# Patient Record
Sex: FEMALE | Race: WHITE | NOT HISPANIC OR LATINO | Employment: OTHER | URBAN - METROPOLITAN AREA
[De-identification: names, ages, dates, MRNs, and addresses within clinical notes are randomized per-mention and may not be internally consistent; named-entity substitution may affect disease eponyms.]

---

## 2018-08-15 ENCOUNTER — OFFICE VISIT (OUTPATIENT)
Dept: MEDICAL GROUP | Facility: MEDICAL CENTER | Age: 43
End: 2018-08-15
Payer: COMMERCIAL

## 2018-08-15 VITALS
SYSTOLIC BLOOD PRESSURE: 102 MMHG | TEMPERATURE: 99.9 F | DIASTOLIC BLOOD PRESSURE: 70 MMHG | HEART RATE: 80 BPM | BODY MASS INDEX: 18.68 KG/M2 | OXYGEN SATURATION: 98 % | HEIGHT: 66 IN | WEIGHT: 116.2 LBS

## 2018-08-15 DIAGNOSIS — Z00.00 PREVENTATIVE HEALTH CARE: ICD-10-CM

## 2018-08-15 DIAGNOSIS — F31.9 BIPOLAR 1 DISORDER (HCC): ICD-10-CM

## 2018-08-15 DIAGNOSIS — F51.01 PRIMARY INSOMNIA: ICD-10-CM

## 2018-08-15 DIAGNOSIS — N63.0 BREAST LUMP IN FEMALE: ICD-10-CM

## 2018-08-15 DIAGNOSIS — Z30.41 ENCOUNTER FOR SURVEILLANCE OF CONTRACEPTIVE PILLS: ICD-10-CM

## 2018-08-15 DIAGNOSIS — G40.909 SEIZURE DISORDER (HCC): ICD-10-CM

## 2018-08-15 PROCEDURE — 99202 OFFICE O/P NEW SF 15 MIN: CPT | Performed by: FAMILY MEDICINE

## 2018-08-15 RX ORDER — LAMOTRIGINE 200 MG/1
400 TABLET ORAL EVERY EVENING
COMMUNITY

## 2018-08-15 RX ORDER — TRAZODONE HYDROCHLORIDE 100 MG/1
100 TABLET ORAL NIGHTLY
COMMUNITY

## 2018-08-15 RX ORDER — NORGESTIMATE AND ETHINYL ESTRADIOL 7DAYSX3 28
1 KIT ORAL DAILY
Qty: 84 TAB | Refills: 3 | Status: SHIPPED
Start: 2018-08-15 | End: 2020-01-04

## 2018-08-15 RX ORDER — NORGESTIMATE AND ETHINYL ESTRADIOL 7DAYSX3 28
1 KIT ORAL DAILY
COMMUNITY
End: 2018-08-15 | Stop reason: SDUPTHER

## 2018-08-15 ASSESSMENT — PATIENT HEALTH QUESTIONNAIRE - PHQ9: CLINICAL INTERPRETATION OF PHQ2 SCORE: 0

## 2018-08-15 NOTE — ASSESSMENT & PLAN NOTE
Takes lamictal   See's psychiatry Dr Marcel Maurice [phone] in [place]   She will coordinate with me and Dr Maurice if changes to contraception

## 2018-08-15 NOTE — LETTER
[facility]  Rosalva Garcia M.D.  [address]  [place] [address]  Fax: [phone]   Authorization for Release/Disclosure of   Protected Health Information   Name: JANET OJEDA : 1975 SSN: [id number]   Address: [address] [place] [address]  [place] [address] Phone:    [phone] (home)    I authorize the entity listed below to release/disclose the PHI below to:   Pending sale to [facility]/Rosalva Garcia M.D. and Rosalva Garcia M.D.   Provider or Entity Name: Manyn Olivas M.D.     Address   City, [facility], [facility]   Phone:      Fax:[phone]     Reason for request: continuity of care   Information to be released:    [  ] LAST COLONOSCOPY,  including any PATH REPORT and follow-up  [  ] LAST FIT/COLOGUARD RESULT [  ] LAST DEXA  [  ] LAST MAMMOGRAM  [  ] LAST PAP  [  ] LAST LABS [  ] RETINA EXAM REPORT  [  ] IMMUNIZATION RECORDS  [xxxx  ] Release all info      [  ] Check here and initial the line next to each item to release ALL health information INCLUDING  _____ Care and treatment for drug and / or alcohol abuse  _____ HIV testing, infection status, or AIDS  _____ Genetic Testing    DATES OF SERVICE OR TIME PERIOD TO BE DISCLOSED: _____________  I understand and acknowledge that:  * This Authorization may be revoked at any time by you in writing, except if your health information has already been used or disclosed.  * Your health information that will be used or disclosed as a result of you signing this authorization could be re-disclosed by the recipient. If this occurs, your re-disclosed health information may no longer be protected by State or Federal laws.  * You may refuse to sign this Authorization. Your refusal will not affect your ability to obtain treatment.  * This Authorization becomes effective upon signing and will  on (date) __________.      If no date is indicated, this Authorization will  one (1) year from the signature date.    Name: Janet Ojeda    Signature:   Date:          8/15/2018       PLEASE FAX REQUESTED RECORDS BACK TO: [phone]

## 2018-08-15 NOTE — ASSESSMENT & PLAN NOTE
She is happy with her current contraceptive pill  Can reduce blood levels of lamictal   If stopping ocp may need to readjust lamictal       was on IUD had painful ovarian cyst her gynocologist switched her to OCP

## 2018-08-15 NOTE — PROGRESS NOTES
This medical record contains text that has been entered with the assistance of computer voice recognition and dictation software.  Therefore, it may contain unintended errors in text, spelling, punctuation, or grammar        Chief Complaint   Patient presents with   • Establish Care     Hx of benign cyst, is know bigger (L), irritated, tender x 2-3 months, getting ,worse       Reanna Ojeda is a 43 y.o. 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     • traZODone (DESYREL) 100 MG Tab Take 100 mg by mouth every evening.     • Norgestim-Eth Estrad Triphasic (TRINESSA, 28,) 0.18/0.215/0.25 MG-35 MCG Tab Take 1 Tab by mouth every day. 84 Tab 3     No current facility-administered medications for this visit.      Patient Active Problem List    Diagnosis Date Noted   • Encounter for surveillance of contraceptive pills 08/15/2018   • Primary insomnia 08/15/2018   • Bipolar 1 disorder (HCC) 08/15/2018   • Preventative health care 08/15/2018   • Breast lump in female 08/15/2018     History reviewed. No pertinent surgical history.   Social History   Substance Use Topics   • Smoking status: Current Some Day Smoker     Packs/day: 1.50     Years: 30.00     Types:  "Cigarettes   • Smokeless tobacco: Never Used      Comment: Smoking cessation   • Alcohol use No      Comment: Sober x 22 years     Family History   Problem Relation Age of Onset   • Hypertension Mother    • Hypertension Father    • Cancer Father    • No Known Problems Sister            ROS  No wt loss no fever  all review of system completed and negative except for those listed above     Objective:     Blood pressure 102/70, pulse 80, temperature 37.7 °C (99.9 °F), height 1.676 m (5' 6\"), weight 52.7 kg (116 lb 3.2 oz), last menstrual period 07/25/2018, SpO2 98 %, not currently breastfeeding. Body mass index is 18.76 kg/m².  Physical Exam:        GEN: comfortable, alert and oriented, well nourished, well developed, in no apparent distress   HEENT: NCAT, eyes: pupils equal and reactive, sclera white, EOMIT, good dentition  HEART: limbs warm and well perfused, regular rate, no JVD, no lower extremity edema  LUNGS: speaking in full sentences, not in apparent respiratory distress, no audible wheezes  MSK: normal tone and bulk, no swelling of the joints, gait steady and normal   See below for breast examination       Assessment and Plan:   The following treatment plan was discussed        Problem List Items Addressed This Visit     Encounter for surveillance of contraceptive pills     She is happy with her current contraceptive pill  Can reduce blood levels of lamictal   If stopping ocp may need to readjust lamictal       was on IUD had painful ovarian cyst her gynocologist switched her to OCP            Relevant Medications    Norgestim-Eth Estrad Triphasic (TRINESSA, 28,) 0.18/0.215/0.25 MG-35 MCG Tab    Primary insomnia     Well controlled on trazodone             Bipolar 1 disorder (HCC)     Takes lamictal   See's psychiatry Dr Marcel Maurice [phone] in [place]   She will coordinate with me and Dr Maurice if changes to contraception                      Relevant Orders    REFERRAL TO PSYCHIATRY    Preventative health " care     Up to date pap had nl 3 years ago           Breast lump in female     She had 2 breast lumps biopsied one on each side benign last year  She feels the lump on her L breast is growing and hurting and interested in surgical options regardless of biopsy results  Ref to breast surgery Dr Shraddha Moss  Diagnostic mammo and ultrasound      L breast 3:00 position 3-4cm, 6:00 position 1 cm, noon position 1cm (all about 1cm away from nipple rubbery mobile )  R breast 2:00 position 1cm 2cm away from nipple rubbery mobile                Relevant Orders    REFERRAL TO PSYCHIATRY    REFERRAL TO GENERAL SURGERY    US-BREAST COMPLETE-LEFT    MA-MAMMO DIAGNOSTIC BILAT W/YULISSA W/CAD    US-BREAST COMPLETE-RIGHT                Instructed to follow up if symptoms worsen or fail to improve, ER/UC precautions discussed as well    Rosalva Garcia MD  [facility], Family Medicine   [address] [place] [address] [address]ky   [place] [address]  Phone: [phone]

## 2018-08-15 NOTE — ASSESSMENT & PLAN NOTE
She had 2 breast lumps biopsied one on each side benign last year  She feels the lump on her L breast is growing and hurting and interested in surgical options regardless of biopsy results  Ref to breast surgery Dr Shraddha Moss  Diagnostic mammo and ultrasound      L breast 3:00 position 3-4cm, 6:00 position 1 cm, noon position 1cm (all about 1cm away from nipple rubbery mobile )  R breast 2:00 position 1cm 2cm away from nipple rubbery mobile

## 2018-08-21 ENCOUNTER — HOSPITAL ENCOUNTER (OUTPATIENT)
Dept: RADIOLOGY | Facility: MEDICAL CENTER | Age: 43
End: 2018-08-21

## 2018-08-27 ENCOUNTER — HOSPITAL ENCOUNTER (OUTPATIENT)
Dept: RADIOLOGY | Facility: MEDICAL CENTER | Age: 43
End: 2018-08-27
Attending: FAMILY MEDICINE
Payer: COMMERCIAL

## 2018-08-27 ENCOUNTER — HOSPITAL ENCOUNTER (OUTPATIENT)
Dept: RADIOLOGY | Facility: MEDICAL CENTER | Age: 43
End: 2018-08-27

## 2018-08-27 DIAGNOSIS — N63.0 BREAST LUMP IN FEMALE: ICD-10-CM

## 2018-08-27 PROCEDURE — 76642 ULTRASOUND BREAST LIMITED: CPT | Mod: RT

## 2018-08-27 PROCEDURE — G0279 TOMOSYNTHESIS, MAMMO: HCPCS

## 2018-09-20 ENCOUNTER — APPOINTMENT (OUTPATIENT)
Dept: ADMISSIONS | Facility: MEDICAL CENTER | Age: 43
End: 2018-09-20
Attending: SURGERY
Payer: COMMERCIAL

## 2018-09-20 RX ORDER — COVID-19 ANTIGEN TEST
220 KIT MISCELLANEOUS PRN
COMMUNITY
End: 2020-02-12

## 2018-09-20 RX ORDER — ACETAMINOPHEN 325 MG/1
650 TABLET ORAL EVERY 4 HOURS PRN
COMMUNITY
End: 2020-05-18

## 2018-09-28 ENCOUNTER — HOSPITAL ENCOUNTER (OUTPATIENT)
Facility: MEDICAL CENTER | Age: 43
End: 2018-09-28
Attending: SURGERY | Admitting: SURGERY
Payer: COMMERCIAL

## 2018-09-28 VITALS
OXYGEN SATURATION: 97 % | WEIGHT: 113.76 LBS | TEMPERATURE: 97.2 F | DIASTOLIC BLOOD PRESSURE: 55 MMHG | RESPIRATION RATE: 16 BRPM | HEIGHT: 66 IN | HEART RATE: 65 BPM | BODY MASS INDEX: 18.28 KG/M2 | SYSTOLIC BLOOD PRESSURE: 102 MMHG

## 2018-09-28 DIAGNOSIS — G89.18 POST-OP PAIN: ICD-10-CM

## 2018-09-28 LAB — HCG SERPL QL: NEGATIVE

## 2018-09-28 PROCEDURE — 88305 TISSUE EXAM BY PATHOLOGIST: CPT

## 2018-09-28 PROCEDURE — 160048 HCHG OR STATISTICAL LEVEL 1-5: Performed by: SURGERY

## 2018-09-28 PROCEDURE — 700111 HCHG RX REV CODE 636 W/ 250 OVERRIDE (IP)

## 2018-09-28 PROCEDURE — A9270 NON-COVERED ITEM OR SERVICE: HCPCS | Performed by: STUDENT IN AN ORGANIZED HEALTH CARE EDUCATION/TRAINING PROGRAM

## 2018-09-28 PROCEDURE — 700101 HCHG RX REV CODE 250

## 2018-09-28 PROCEDURE — 84703 CHORIONIC GONADOTROPIN ASSAY: CPT

## 2018-09-28 PROCEDURE — 160009 HCHG ANES TIME/MIN: Performed by: SURGERY

## 2018-09-28 PROCEDURE — 160029 HCHG SURGERY MINUTES - 1ST 30 MINS LEVEL 4: Performed by: SURGERY

## 2018-09-28 PROCEDURE — 160036 HCHG PACU - EA ADDL 30 MINS PHASE I: Performed by: SURGERY

## 2018-09-28 PROCEDURE — 700102 HCHG RX REV CODE 250 W/ 637 OVERRIDE(OP): Performed by: STUDENT IN AN ORGANIZED HEALTH CARE EDUCATION/TRAINING PROGRAM

## 2018-09-28 PROCEDURE — 160035 HCHG PACU - 1ST 60 MINS PHASE I: Performed by: SURGERY

## 2018-09-28 PROCEDURE — 160002 HCHG RECOVERY MINUTES (STAT): Performed by: SURGERY

## 2018-09-28 PROCEDURE — 501838 HCHG SUTURE GENERAL: Performed by: SURGERY

## 2018-09-28 PROCEDURE — 160041 HCHG SURGERY MINUTES - EA ADDL 1 MIN LEVEL 4: Performed by: SURGERY

## 2018-09-28 PROCEDURE — A6402 STERILE GAUZE <= 16 SQ IN: HCPCS | Performed by: SURGERY

## 2018-09-28 RX ORDER — BUPIVACAINE HYDROCHLORIDE AND EPINEPHRINE 5; 5 MG/ML; UG/ML
INJECTION, SOLUTION PERINEURAL
Status: DISCONTINUED | OUTPATIENT
Start: 2018-09-28 | End: 2018-09-28 | Stop reason: HOSPADM

## 2018-09-28 RX ORDER — SODIUM CHLORIDE, SODIUM LACTATE, POTASSIUM CHLORIDE, CALCIUM CHLORIDE 600; 310; 30; 20 MG/100ML; MG/100ML; MG/100ML; MG/100ML
INJECTION, SOLUTION INTRAVENOUS CONTINUOUS
Status: DISCONTINUED | OUTPATIENT
Start: 2018-09-28 | End: 2018-09-28 | Stop reason: HOSPADM

## 2018-09-28 RX ORDER — OXYCODONE HCL 5 MG/5 ML
10 SOLUTION, ORAL ORAL
Status: DISCONTINUED | OUTPATIENT
Start: 2018-09-28 | End: 2018-09-28 | Stop reason: HOSPADM

## 2018-09-28 RX ORDER — HYDROMORPHONE HYDROCHLORIDE 2 MG/ML
0.1 INJECTION, SOLUTION INTRAMUSCULAR; INTRAVENOUS; SUBCUTANEOUS
Status: DISCONTINUED | OUTPATIENT
Start: 2018-09-28 | End: 2018-09-28 | Stop reason: HOSPADM

## 2018-09-28 RX ORDER — ACETAMINOPHEN 500 MG
1000 TABLET ORAL ONCE
Status: COMPLETED | OUTPATIENT
Start: 2018-09-28 | End: 2018-09-28

## 2018-09-28 RX ORDER — HALOPERIDOL 5 MG/ML
1 INJECTION INTRAMUSCULAR
Status: DISCONTINUED | OUTPATIENT
Start: 2018-09-28 | End: 2018-09-28 | Stop reason: HOSPADM

## 2018-09-28 RX ORDER — OXYCODONE HYDROCHLORIDE 5 MG/1
5 TABLET ORAL
Status: DISCONTINUED | OUTPATIENT
Start: 2018-09-28 | End: 2018-09-28 | Stop reason: HOSPADM

## 2018-09-28 RX ORDER — ONDANSETRON 2 MG/ML
4 INJECTION INTRAMUSCULAR; INTRAVENOUS
Status: DISCONTINUED | OUTPATIENT
Start: 2018-09-28 | End: 2018-09-28 | Stop reason: HOSPADM

## 2018-09-28 RX ORDER — MEPERIDINE HYDROCHLORIDE 50 MG/ML
INJECTION INTRAMUSCULAR; INTRAVENOUS; SUBCUTANEOUS
Status: COMPLETED
Start: 2018-09-28 | End: 2018-09-28

## 2018-09-28 RX ORDER — CELECOXIB 200 MG/1
400 CAPSULE ORAL ONCE
Status: COMPLETED | OUTPATIENT
Start: 2018-09-28 | End: 2018-09-28

## 2018-09-28 RX ORDER — OXYCODONE HYDROCHLORIDE 5 MG/1
10 TABLET ORAL
Status: DISCONTINUED | OUTPATIENT
Start: 2018-09-28 | End: 2018-09-28 | Stop reason: HOSPADM

## 2018-09-28 RX ORDER — MEPERIDINE HYDROCHLORIDE 25 MG/ML
12.5 INJECTION INTRAMUSCULAR; INTRAVENOUS; SUBCUTANEOUS
Status: DISCONTINUED | OUTPATIENT
Start: 2018-09-28 | End: 2018-09-28 | Stop reason: HOSPADM

## 2018-09-28 RX ORDER — OXYCODONE HCL 5 MG/5 ML
5 SOLUTION, ORAL ORAL
Status: DISCONTINUED | OUTPATIENT
Start: 2018-09-28 | End: 2018-09-28 | Stop reason: HOSPADM

## 2018-09-28 RX ORDER — HYDROMORPHONE HYDROCHLORIDE 2 MG/ML
0.2 INJECTION, SOLUTION INTRAMUSCULAR; INTRAVENOUS; SUBCUTANEOUS
Status: DISCONTINUED | OUTPATIENT
Start: 2018-09-28 | End: 2018-09-28 | Stop reason: HOSPADM

## 2018-09-28 RX ORDER — HYDROCODONE BITARTRATE AND ACETAMINOPHEN 5; 325 MG/1; MG/1
1-2 TABLET ORAL EVERY 4 HOURS PRN
Qty: 30 TAB | Refills: 0 | Status: SHIPPED | OUTPATIENT
Start: 2018-09-28 | End: 2018-10-05

## 2018-09-28 RX ORDER — HYDROMORPHONE HYDROCHLORIDE 2 MG/ML
0.4 INJECTION, SOLUTION INTRAMUSCULAR; INTRAVENOUS; SUBCUTANEOUS
Status: DISCONTINUED | OUTPATIENT
Start: 2018-09-28 | End: 2018-09-28 | Stop reason: HOSPADM

## 2018-09-28 RX ORDER — BUPIVACAINE HYDROCHLORIDE AND EPINEPHRINE 5; 5 MG/ML; UG/ML
INJECTION, SOLUTION EPIDURAL; INTRACAUDAL; PERINEURAL
Status: DISCONTINUED
Start: 2018-09-28 | End: 2018-09-28 | Stop reason: HOSPADM

## 2018-09-28 RX ADMIN — MEPERIDINE HYDROCHLORIDE 12.5 MG: 50 INJECTION INTRAMUSCULAR; INTRAVENOUS; SUBCUTANEOUS at 09:43

## 2018-09-28 RX ADMIN — SODIUM CHLORIDE, SODIUM LACTATE, POTASSIUM CHLORIDE, CALCIUM CHLORIDE 1000 ML: 600; 310; 30; 20 INJECTION, SOLUTION INTRAVENOUS at 09:54

## 2018-09-28 RX ADMIN — MEPERIDINE HYDROCHLORIDE 12.5 MG: 50 INJECTION INTRAMUSCULAR; INTRAVENOUS; SUBCUTANEOUS at 09:45

## 2018-09-28 RX ADMIN — ACETAMINOPHEN 1000 MG: 500 TABLET, FILM COATED ORAL at 08:11

## 2018-09-28 RX ADMIN — CELECOXIB 400 MG: 200 CAPSULE ORAL at 08:12

## 2018-09-28 RX ADMIN — SODIUM CHLORIDE, SODIUM LACTATE, POTASSIUM CHLORIDE, CALCIUM CHLORIDE: 600; 310; 30; 20 INJECTION, SOLUTION INTRAVENOUS at 07:53

## 2018-09-28 ASSESSMENT — PAIN SCALES - GENERAL
PAINLEVEL_OUTOF10: 1
PAINLEVEL_OUTOF10: 0
PAINLEVEL_OUTOF10: 1
PAINLEVEL_OUTOF10: 0
PAINLEVEL_OUTOF10: 0

## 2018-09-28 NOTE — PROGRESS NOTES
0916 pt adm to PACU from OR via sonia carranza by RN and Anesthesia. Report received and care assumed. Monitors on - VSS, breathing even and unlabored, Left breast dressing - DSD with tegaderm over - YOKASTA  0927 - LMA removed without problems and/or complications. Pt denies pain or nausea - ice to incision  1025 discharge instructions reviewed with pt and family. All questions and concerns addressed.  1044 pt discharged via W/C to private car - acc by family and  Lex

## 2018-09-28 NOTE — OP REPORT
Operative Report    Date: 9/28/2018    Surgeon: Shraddha Moss M.D.    Assistant: PIERO Washington    Anesthesiologist: Chuck NEWSOME    Pre-operative Diagnosis:  left breast palpable abnormality    Post-operative Diagnosis: same     Procedure: left breast excisional biopsy    19120 Excision of cyst, fibroadenoma, or other benign or malignant tumor, aberrant breast tissue, duct lesion, nipple or areolar lesion (except 19300), open, male or female, 1 or more lesions    Procedure in detail: The patient was identified in the pre-operative holding area and brought to the operating room. Correct side and site were identified. Pre-operative antibiotics of ancef were administered prior to the procedure. Anesthesia was smoothly induced.The patient was then prepped and draped in the usual sterile fashion.    The skin was infiltrated with local anesthetic and a curvilinear incision was made in the upper outer quadrant of the nipple line. The breast tissue was grasped with Allis clamps and a core of tissue was removed encompassing the palpable abnormality.The specimen was then completely removed, marked with suture for orientation, and was sent for permanent pathology. Meticulous hemostasis was achieved with electrocautery. The area was irrigated and suctioned. The skin was closed in two layers with vicryl and monocryl. Sterile dressings were applied.     The patient was awakened from anesthetic, and was taken to the recovery room in stable condition.    Sponge and needle counts were correct at the end of the case.     Specimen: left breast mass    EBL: minimal    Dispo: stable, extubated, to PACU    Shraddha Moss M.D.  [facility]  [phone]

## 2018-09-28 NOTE — PROGRESS NOTES
Discharge Instructions, Lumpectomy:    Care of Your Incisions:  • Leave the bandages on for 48 hours. You can shower with the dressing on as it is waterproof.  Avoid getting lotions, powders or deodorant on the incision while it is healing.  • There is no need to re-bandage the incisions after the first 48 hours, but it may be more comfortable to tuck a gauze pad inside your bra for the first week. You can buy 4 x 4 gauze dressings at your pharmacy.   • You may have thin paper strips across the incision. These are called Steri-Strips. When they start to curl at the edges, you can peel them off. It is okay to shower with these on.  • Don’t worry if the area under either incision feels firm or hard. This is normal and usually softens within a few months.    How to Manage Discomfort After Surgery:  • It is normal to have tenderness, discomfort or mild swelling at the site of the incisions.     You may also feel:  - Numbness at the incisions.  - Occasional shooting pains in the breast as you heal.    • You will be given a prescription for pain medication prior to being discharged from the hospital. If you are having pain, take the medication as directed by your physician and by the label directions. You should be comfortable and moving around. Most women use some prescription pain medication, though some need only over the counter pain medication, such as ibuprofen (Motrin) or acetaminophen (Tylenol). Some women have little pain and take nothing at all. Whatever amount of pain you have, it is important to listen to your body and use the medication if needed during your recovery.    • Prescription pain medication may cause constipation. If you are having problems, use what you normally would or call your nurse for suggestions. It also helps to stay regular by including fiber in your diet (for example: bran or fruits and vegetables) and drink plenty of liquids (water, juice, etc.).    Activity:  • You may resume your  normal routine, but avoid heavy lifting (over 10 pounds), pushing or pulling until your first post-operative visit, unless otherwise specified by your surgeon.  • Do not drive for at least 24-48 hours after surgery (unless otherwise directed by your surgeon), or while you are taking prescription pain medicine. Prescription pain medicine causes drowsiness (makes you sleepy) so you should avoid doing any tasks where you should be awake and alert (driving, operating machinery, etc).    When to Call Your Doctor/Nurse:  • If you have a fever greater than 100.5, increased pain, redness or warmth at the area around the incision, drainage from the incision or around the drain, or swelling of the arm. Call Dr. Moss's office at [phone] with any other questions or concerns.    You should have an appointment to see Dr. Moss about a week after surgery, call [phone] if you do not already have an appointment.    Office address:  [address] [place] [address] [address]  [place] [id number]      Shraddha Moss M.D.  [facility]  [phone]

## 2018-09-28 NOTE — DISCHARGE INSTRUCTIONS
ACTIVITY: Rest and take it easy for the first 24 hours.  A responsible adult is recommended to remain with you during that time.  It is normal to feel sleepy.  We encourage you to not do anything that requires balance, judgment or coordination.    MILD FLU-LIKE SYMPTOMS ARE NORMAL. YOU MAY EXPERIENCE GENERALIZED MUSCLE ACHES, THROAT IRRITATION, HEADACHE AND/OR SOME NAUSEA.    FOR 24 HOURS DO NOT:  Drive, operate machinery or run household appliances.  Drink beer or alcoholic beverages.   Make important decisions or sign legal documents.    SPECIAL INSTRUCTIONS: PER MD    DIET: To avoid nausea, slowly advance diet as tolerated, avoiding spicy or greasy foods for the first day.  Add more substantial food to your diet according to your physician's instructions.  Babies can be fed formula or breast milk as soon as they are hungry.  INCREASE FLUIDS AND FIBER TO AVOID CONSTIPATION.    SURGICAL DRESSING/BATHING: PER MD    FOLLOW-UP APPOINTMENT:  A follow-up appointment should be arranged with your doctor in ONE WEEK; call to schedule.    You should CALL YOUR PHYSICIAN if you develop:  Fever greater than 101 degrees F.  Pain not relieved by medication, or persistent nausea or vomiting.  Excessive bleeding (blood soaking through dressing) or unexpected drainage from the wound.  Extreme redness or swelling around the incision site, drainage of pus or foul smelling drainage.  Inability to urinate or empty your bladder within 8 hours.  Problems with breathing or chest pain.    You should call 911 if you develop problems with breathing or chest pain.  If you are unable to contact your doctor or surgical center, you should go to the nearest emergency room or urgent care center.  Physician's telephone #: [phone]    If any questions arise, call your doctor.  If your doctor is not available, please feel free to call the Surgical Center at [phone].  The Center is open Monday through Friday from 7AM to 7PM.  You can also call  the HEALTH HOTLINE open 24 hours/day, 7 days/week and speak to a nurse at [phone], or toll free at [phone].    A registered nurse may call you a few days after your surgery to see how you are doing after your procedure.    MEDICATIONS: Resume taking daily medication.  Take prescribed pain medication with food.  If no medication is prescribed, you may take non-aspirin pain medication if needed.  PAIN MEDICATION CAN BE VERY CONSTIPATING.  Take a stool softener or laxative such as senokot, pericolace, or milk of magnesia if needed.    Prescription given for NORCO.  Last pain medication given at NA.    If your physician has prescribed pain medication that includes Acetaminophen (Tylenol), do not take additional Acetaminophen (Tylenol) while taking the prescribed medication.    Depression / Suicide Risk    As you are discharged from this [facility] facility, it is important to learn how to keep safe from harming yourself.    Recognize the warning signs:  · Abrupt changes in personality, positive or negative- including increase in energy   · Giving away possessions  · Change in eating patterns- significant weight changes-  positive or negative  · Change in sleeping patterns- unable to sleep or sleeping all the time   · Unwillingness or inability to communicate  · Depression  · Unusual sadness, discouragement and loneliness  · Talk of wanting to die  · Neglect of personal appearance   · Rebelliousness- reckless behavior  · Withdrawal from people/activities they love  · Confusion- inability to concentrate     If you or a loved one observes any of these behaviors or has concerns about self-harm, here's what you can do:  · Talk about it- your feelings and reasons for harming yourself  · Remove any means that you might use to hurt yourself (examples: pills, rope, extension cords, firearm)  · Get professional help from the community (Mental Health, Substance Abuse, psychological counseling)  · Do not be  alone:Call your Safe Contact- someone whom you trust who will be there for you.  · Call your local CRISIS HOTLINE 888-8152 or 262-187-6774  · Call your local Children's Mobile Crisis Response Team [place] (827) 250-4664 or www.[employer]  · Call the toll free National Suicide Prevention Hotlines   · National Suicide Prevention Lifeline 359-588-EHPN (4521)  · National utoopia Line Network 800-SUICIDE (363-4059)

## 2018-09-28 NOTE — H&P
"[History and Physical] [Reanna Ojeda] [[id number]]  History and Physical     Patient Name: Reanna Ojeda   Patient ID: [id number]   Sex: Female   YOB: 1975       Referring Provider: Rosalva Garcia MD    Visit Date: 2018   Provider: Shraddha Moss MD   Location: [facility]   Location Address: [address] [place] [address]  KADEEM Mcdonald  [id number]   Location Phone: [phone]         Chief Complaint  · \"I feel a mass in my left breast.\"     History Of Present Illness  The patient is a 43 year old /White female, who presents on referral from Rosalva Garcia MD for a surgical evaluation of a palpable breast mass. The masses are located in the left breast at the 3 o'clock position, 6 o'clock position and 12 o'clock and in the right breast at the 2 o'clock position has been present for one year .    She also reports breast tenderness and breast swelling. She describes tenderness in the from about 10-3 o'clock position of the left breast. The swelling is in the 3 o'clock area of the left breast.    Her history is notable for previous benign biopsy. She reports having a benign biopsy of the mass at the 6 o'clock position of the left breast in . A biopsy of the lesion performed on 2017 revealed left breast upper-outer quadrant: fibroadenomatoid hyperplasia and cyst formation with associated calcifications. Breast, right, 10 o'clock mass: fibroepithelial proliferation consistent with fibroadenoma, Breast left 2 o'clock mass: fibroepithelial proliferation consistent with fibroadenoma..    She is premenopausal and her pregnancy history is  1 , Para 1 , Abortus 0. She does not have a history of using HRT. She is currently on BCP, which she has used for the last 3 years.    Family History:  The patient reports a positive family history of breast cancer. She states her paternal grandmother had breast cancer (see FMHx).       Past Medical History  Disease Name Date Onset Notes "   Cyst (Type of Cyst) 2015, 2017, - [employer] 08/20/2018 (B) breast ovarian cyst   Family history of breast cancer -- --   History of drug use -- Speed, ages: [age]   Joint Pain / DJD (Degenerative Joint Disease) -- - [employer] 08/20/2018 C4/5, with bulging   Manic depressive disorder -- --   Psychological Disorder (List type) -- - [employer] 08/20/2018   Tobacco use disorder -- --         Past Surgical History  Procedure Name Date Notes   Biopsy (List Types) 7/2017 - [employer] 08/20/2018 (B) breast         Medication List  Name Date Started Instructions   BCP  PO QD   Lamictal 200 mg oral tablet  take 2 tablets (400 mg) by oral route once daily   trazodone 100 mg oral tablet  take 1 tablet (100 mg) by oral route once daily at bedtime         Allergy List  Allergen Name Date Reaction Notes   NO KNOWN DRUG ALLERGIES -- -- - [employer] 08/20/2018   No Known Food or Environmental Allergy -- -- - [employer] 08/20/2018         Family Medical History  Disease Name Relative/Age Notes   Family History of Prostate Cancer Grandmother (maternal)/ --    Grandmother (paternal)/60s     Uncle/64     /    Heart Disease (FH) Grandmother (maternal)/ --    Grandmother (paternal)/60s     Uncle/64     /    Diabetes (List Type) Grandmother (maternal)/ --    Grandmother (paternal)/60s     Uncle/64     /    Family history of breast cancer Grandmother (maternal)/ --    Grandmother (paternal)/60s     Uncle/64     /          Social History  Finding Status Start/Stop Quantity Notes   Alcohol Never --/-- -- - [employer] 08/20/2018   Drug use Former [age] speed Denies hx of IVDU    -- --/-- -- - [employer] 08/20/2018   Tobacco Current every day 15/-- 1 pk less/day - [employer] 08/20/2018   Unemployed -- --/-- -- - [employer] 08/20/2018         Immunizations  Name  Date Admin Mfg Trade Name Lot Number Route Inj VIS Given VIS Publication   Influenza  Refused 09/04/2018 NE Not Entered  NE NE     Comments: **cc9/4/18         Review of  "Systems  · Constitutional  ? Denies : fever, chills, weight loss, weight gain, fatigue, loss of appetite, night sweats  · Eyes  ? Admits : wear glasses/contact lenses  ? Denies : changes in vision  · HENT  ? Denies : headaches, hoarseness, difficulty swallowing  · Breasts  ? Admits : lumps, tenderness, swelling  ? Denies : nipple discharge  · Cardiovascular  ? Denies : cardiac murmurs, irregular heart beats  · Respiratory  ? Denies : shortness of breath, hoarseness  · Gastrointestinal  ? Denies : nausea, vomiting, diarrhea, constipation, blood in stools  · Genitourinary  ? Denies : urgency, frequency, dysuria, nocturia, hematuria, urinary hesitancy  · Integument  ? Denies : rash, new skin lesions, non-healing wounds  · Neurologic  ? Denies : tingling or numbness, muscular weakness, headache  · Musculoskeletal  ? Denies : muscular weakness  · Endocrine  ? Denies : diabetes  · Psychiatric  ? Admits : anxiety, depression  ? Denies : difficulty sleeping  · Heme-Lymph  ? Denies : easy bleeding, easy bruising, lymph node enlargement or tenderness  · Allergic-Immunologic  ? Denies : frequent illnesses     Vitals  Date Time BP Position Site L\R Cuff Size HR RR TEMP (F) WT HT BMI kg/m2 BSA m2 O2 Sat       09/04/2018 09:48 /46 Sitting    71 - R   114lbs 0oz 5'  6\" 18.4 1.55 99 %          Physical Examination  · Constitutional  ? Appearance : A well-nourished, well-developed patient in no acute distress.  · Eyes  ? Conjunctiva and Eyelids : Clear and without lesions.  ? Sclera : No scleral icterus noted  · HENT  ? Head : Normocephalic without lesions.  · Neck  ? Inspection and Palpation : Normal appearance without tenderness on palpation or deformities, trachea midline  ? Thyroid : gland size normal, nontender, no nodules or masses present on palpation, gland position midline, thyroid motion normal during deglutition, trachea midline  · Chest  ? Inspection of Chest : No lesions, deformities or traumatic injuries " present.  ? Respiratory Effort : Breathing is unlabored without accessory muscle use.  ? Auscultation : Normal breath sounds  · Cardiovascular  ? Heart :  § Auscultation : Heart rate is regular with normal rhythm. No murmurs are heard.  ? Peripheral Vascular System :  § Carotid Arteries : Normal pulses bilaterally, with no bruits present.  § Pedal Pulses : 2+ bilaterally  · Breasts  ? Breast/ Axillae Inspection :  § Right Breast : Normal in size with no skin changes or nipple discharge present. The nipple and areola are within normal limits. No implant is present.  § Left Breast : Normal in size with no skin changes or nipple discharge present. The nipple and areola are within normal limits. No implant is present.  ? Breast/ Axillae Palpation :  § Right Breast : No masses present on palpation of the breast or axilla. No breast tenderness.  § Left Breast : 4 cm mobile mass UOQ. No masses present on palpation of the axilla. No breast tenderness.  · Gastrointestinal  ? Abdominal Exam : No masses or tenderness noted. No rebound.  ? Liver and Spleen : No hepatosplenomegaly.  ? Hernias : No inguinal or femoral herniae noted.  · Genitourinary  ? FEMALE  : Pelvic examination was deferred to the patient's primary physician  ? Digital Rectal Exam : Rectal examination was deferred to the patient's primary physician  · Musculoskeletal  ? Left Upper Extremity :  § Inspection and Palpation : No cords, edema or tenderness.  ? Right Lower Extremity :  § Inspection and Palpation : No cords, edema or tenderness.  · Neurologic and Psychiatric  ? Mental Status :  § Judgment, Insight : Judgment and insight intact  § Mood and Affect : Normal mood with an appropriate affect        Assessment  · Breast Mass, Left     611.72/N63  · History of drug use     305.93/Z87.898  · Manic depressive disorder     296.80/F31.9  · Tobacco use disorder     305.1/F17.200     Plan  · Orders  ? Per Anesthesia Pre-operative Orders (PREOP) - -  09/04/2018  ? Breast mass excision (65433, [id number]) - - 09/04/2018   left  ? Eligible professional attests to documenting the patient's curre () - - 08/31/2018  · Instructions  ? Discussion of differential diagnosis, indications for procedures. Symptomatic left breast mass, bx c/w fibroadenoma. I have recommended breast mass excision.  ? EXCISIONAL BREAST BIOPSY I have explained options including, but not limited to, observation, needle biopsy and excisional biopsy. I have explained the risks of observation including, but not limited to, identifying a cancer of the breast at a later date when it may be less curable or even incurable. I have explained risks of elective surgical intervention including, but not limited to, bleeding, infection and missing the abnormality, which may require a secondary biopsy at a later date. I have explained other risks including, but not limited to, blood clots in the legs, pulmonary embolism, pneumonia, strokes, heart attacks and the measures that will be taken to reduce those risks. I have explained anesthetic options including local and general anesthesia. The patient wishes to proceed. She will be scheduled for an excisional breast biopsy.  ? Copy of this report to Dr. Garcia  ? OPIOID: Prior to prescribing any narcotic medication, I have performed a patient risk assessment. I have reviewed this patient's  and found this patient to be low risk for controlled substance abuse. A copy of this has been placed in the patient's medical record. We discussed alternative treatments to the opioid, such as NSAID medication, ice, heating packs, and how to incorporate these into the patient's treatment plan to decrease the need for and wean from the narcotic medications. I have completed an informed consent with the patient for narcotic usage. the patient understands the risks and benefits. The consent was signed by the patient and placed in the medical record.      Shraddha Moss  M.D.  [facility]  [phone]

## 2019-08-26 ENCOUNTER — OFFICE VISIT (OUTPATIENT)
Dept: MEDICAL GROUP | Facility: MEDICAL CENTER | Age: 44
End: 2019-08-26
Payer: COMMERCIAL

## 2019-08-26 VITALS
DIASTOLIC BLOOD PRESSURE: 70 MMHG | BODY MASS INDEX: 18.99 KG/M2 | HEART RATE: 78 BPM | WEIGHT: 121 LBS | SYSTOLIC BLOOD PRESSURE: 102 MMHG | OXYGEN SATURATION: 96 % | HEIGHT: 67 IN | TEMPERATURE: 97.8 F

## 2019-08-26 DIAGNOSIS — Z30.41 ENCOUNTER FOR SURVEILLANCE OF CONTRACEPTIVE PILLS: ICD-10-CM

## 2019-08-26 DIAGNOSIS — Z00.00 ANNUAL PHYSICAL EXAM: ICD-10-CM

## 2019-08-26 DIAGNOSIS — N95.1 PERIMENOPAUSE: ICD-10-CM

## 2019-08-26 DIAGNOSIS — F31.9 BIPOLAR 1 DISORDER (HCC): ICD-10-CM

## 2019-08-26 PROCEDURE — 99214 OFFICE O/P EST MOD 30 MIN: CPT | Performed by: FAMILY MEDICINE

## 2019-08-26 NOTE — ASSESSMENT & PLAN NOTE
She is happy with her current contraceptive pill  Can reduce blood levels of lamictal   If stopping ocp may need to readjust lamictal       was on IUD had painful ovarian cyst her gynocologist switched her to OCP , of note she has had surgical excision of fibrocystic breast tissue.       She would like to switch to a new method taking active pills continuously to suppress menses, so we will need to switch to monophasic.  I will switch to similar monophasic

## 2019-08-26 NOTE — PROGRESS NOTES
This medical record contains text that has been entered with the assistance of computer voice recognition and dictation software.  Therefore, it may contain unintended errors in text, spelling, punctuation, or grammar        Chief Complaint   Patient presents with   • Annual Exam     physical        Reanna Ojeda is a 44 y.o. 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 Shraddha Moss --->fibroadenoma            Current Outpatient Medications   Medication Sig Dispense Refill   • norgestrel-ethinyl estradiol (LO-OVRAL) 0.3-30 MG-MCG Tab Take active pills continuously to suppress menses 84 Tab 3   • Naproxen Sodium (ALEVE) 220 MG Cap Take 220 mg by mouth as needed.     • acetaminophen (TYLENOL) 325 MG Tab Take 650 mg by mouth every four hours as needed.     • lamotrigine (LAMICTAL) 200 MG tablet Take 400 mg by mouth every day.     • traZODone (DESYREL) 100 MG Tab Take 100 mg by mouth every evening.     • Norgestim-Eth Estrad Triphasic (TRINESSA, 28,) 0.18/0.215/0.25 MG-35 MCG Tab Take 1 Tab by mouth every day. 84 Tab 3     No current facility-administered medications for this visit.      Patient Active Problem List    Diagnosis Date Noted   • Perimenopause 08/26/2019   • Annual physical exam 08/26/2019   • Encounter for surveillance of contraceptive pills 08/15/2018   • Primary insomnia 08/15/2018   • Bipolar 1 disorder (HCC) 08/15/2018   • Preventative health care 08/15/2018   • Breast lump in female 08/15/2018     Past Surgical History:   Procedure Laterality Date   • BREAST BIOPSY Left 9/28/2018    Procedure: BREAST BIOPSY- FOR EXCISION OF BREAST MASS;  Surgeon: Shraddha Moss M.D.;  Location: SURGERY SAME DAY [facility];  Service: General      Social History  "    Tobacco Use   • Smoking status: Former Smoker     Packs/day: 0.50     Years: 33.00     Pack years: 16.50     Types: Cigarettes     Last attempt to quit: 2019     Years since quittin.3   • Smokeless tobacco: Never Used   • Tobacco comment: Smoking cessation   Substance Use Topics   • Alcohol use: No     Comment: Sober x 22 years   • Drug use: No     Comment: Sober x 22 years     Family History   Problem Relation Age of Onset   • Hypertension Mother    • Hypertension Father    • Cancer Father    • No Known Problems Sister            ROS  No wt loss no fever  all review of system completed and negative except for those listed above     Objective:     /70 (BP Location: Left arm, Patient Position: Sitting, BP Cuff Size: Adult)   Pulse 78   Temp 36.6 °C (97.8 °F) (Temporal)   Ht 1.689 m (5' 6.5\")   Wt 54.9 kg (121 lb)   SpO2 96%  Body mass index is 19.24 kg/m².  Physical Exam:        Constitutional: Alert, no distress.  Skin: Warm, dry, good turgor, no rashes in visible areas.  Eye: Equal, round and reactive, conjunctiva clear, lids normal.  ENMT: Lips without lesions, good dentition, oropharynx clear.  Neck: Trachea midline, no masses, no thyromegaly. No cervical or supraclavicular lymphadenopathy.  Respiratory: Unlabored respiratory effort, lungs clear to auscultation, no wheezes, no ronchi.  Cardiovascular: Normal S1, S2, no murmur, no edema.  Abdomen: Soft, non-tender, no masses, no hepatosplenomegaly.  Psych: Alert and oriented x3, normal affect and mood.        Assessment and Plan:   The following treatment plan was discussed        Problem List Items Addressed This Visit     Encounter for surveillance of contraceptive pills     She is happy with her current contraceptive pill  Can reduce blood levels of lamictal   If stopping ocp may need to readjust lamictal       was on IUD had painful ovarian cyst her gynocologist switched her to OCP , of note she has had surgical excision of fibrocystic " breast tissue.       She would like to switch to a new method taking active pills continuously to suppress menses, so we will need to switch to monophasic.  I will switch to similar monophasic               Relevant Medications    norgestrel-ethinyl estradiol (LO-OVRAL) 0.3-30 MG-MCG Tab    Bipolar 1 disorder (HCC)     Takes lamictal   See's psychiatry Dr Marcel Maurice [phone] in [place]   She will coordinate with me and Dr Maurice if changes to contraception                      Perimenopause     Counseled to see me for any vaginal bleeding that is too heavy or too frequent                Annual physical exam     Labs ordered   Prev health counseling discussed               Relevant Orders    Basic Metabolic Panel    Lipid Profile                Instructed to follow up if symptoms worsen or fail to improve, ER/UC precautions discussed as well    Rosalva Garcia MD  [facility], Family Medicine   [address] [place], [place] [address]y   [place] [id number]  Phone: [phone]

## 2019-08-26 NOTE — ASSESSMENT & PLAN NOTE
Takes lamictal   See's psychiatry Dr Marcel Maurice [phone] in [place]   She will coordinate with me and Dr Maurice if changes to contraception

## 2019-08-28 ENCOUNTER — HOSPITAL ENCOUNTER (OUTPATIENT)
Dept: RADIOLOGY | Facility: MEDICAL CENTER | Age: 44
End: 2019-08-28
Attending: FAMILY MEDICINE
Payer: COMMERCIAL

## 2019-08-28 DIAGNOSIS — Z12.31 VISIT FOR SCREENING MAMMOGRAM: ICD-10-CM

## 2019-08-28 PROCEDURE — 77063 BREAST TOMOSYNTHESIS BI: CPT

## 2019-09-26 ENCOUNTER — APPOINTMENT (OUTPATIENT)
Dept: MEDICAL GROUP | Facility: MEDICAL CENTER | Age: 44
End: 2019-09-26
Payer: COMMERCIAL

## 2019-10-09 ENCOUNTER — OFFICE VISIT (OUTPATIENT)
Dept: MEDICAL GROUP | Facility: MEDICAL CENTER | Age: 44
End: 2019-10-09
Payer: COMMERCIAL

## 2019-10-09 VITALS
BODY MASS INDEX: 19.62 KG/M2 | HEIGHT: 67 IN | RESPIRATION RATE: 14 BRPM | SYSTOLIC BLOOD PRESSURE: 104 MMHG | OXYGEN SATURATION: 98 % | TEMPERATURE: 98.6 F | HEART RATE: 78 BPM | WEIGHT: 125 LBS | DIASTOLIC BLOOD PRESSURE: 70 MMHG

## 2019-10-09 DIAGNOSIS — L81.9 ATYPICAL PIGMENTED SKIN LESION: ICD-10-CM

## 2019-10-09 DIAGNOSIS — F31.81 BIPOLAR 2 DISORDER (HCC): ICD-10-CM

## 2019-10-09 PROCEDURE — 99214 OFFICE O/P EST MOD 30 MIN: CPT | Performed by: FAMILY MEDICINE

## 2019-10-09 NOTE — ASSESSMENT & PLAN NOTE
Takes lamictal   See's psychiatry Dr Marcel Maurice [phone] in [place]   She will coordinate with me and Dr Maurice if changes to contraception     I previously had documented bipolar 1 disorder which today I am notified is incorrect that instead her diagnosis is bipolar 2

## 2019-10-09 NOTE — ASSESSMENT & PLAN NOTE
2 atypical features color and border  In the gluteal cleft  I would like her to schedule short term follow up we will re-examine and if growing/changing will plan to do deep shave excision      Over 25 minutes spent with patient face to face, greater than 50% time spent with plan/coordination of care regarding that which is discussed in the HPI and A&P

## 2019-10-09 NOTE — PROGRESS NOTES
This medical record contains text that has been entered with the assistance of computer voice recognition and dictation software.  Therefore, it may contain unintended errors in text, spelling, punctuation, or grammar        Chief Complaint   Patient presents with   • Nevus     skin mole on bottom        Reanna Ojeda is a 44 y.o. 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 Shraddha Moss --->fibroadenoma        New issue   Pigmented nevi R gluteal area observed by accupunturist  Unsure if growing or changing         Current Outpatient Medications   Medication Sig Dispense Refill   • Naproxen Sodium (ALEVE) 220 MG Cap Take 220 mg by mouth as needed.     • acetaminophen (TYLENOL) 325 MG Tab Take 650 mg by mouth every four hours as needed.     • lamotrigine (LAMICTAL) 200 MG tablet Take 400 mg by mouth every day.     • traZODone (DESYREL) 100 MG Tab Take 100 mg by mouth every evening.     • Norgestim-Eth Estrad Triphasic (TRINESSA, 28,) 0.18/0.215/0.25 MG-35 MCG Tab Take 1 Tab by mouth every day. 84 Tab 3   • norgestrel-ethinyl estradiol (LO-OVRAL) 0.3-30 MG-MCG Tab Take active pills continuously to suppress menses 84 Tab 3     No current facility-administered medications for this visit.      Patient Active Problem List    Diagnosis Date Noted   • Atypical pigmented skin lesion 10/09/2019   • Perimenopause 08/26/2019   • Annual physical exam 08/26/2019   • Encounter for surveillance of contraceptive pills 08/15/2018   • Primary insomnia 08/15/2018   • Bipolar 2 disorder (HCC) 08/15/2018   • Preventative health care 08/15/2018   • Breast lump in female 08/15/2018     Past Surgical History:   Procedure Laterality Date   • BREAST BIOPSY Left 9/28/2018    Procedure: BREAST BIOPSY- FOR EXCISION OF BREAST MASS;  Surgeon: Shraddha Moss,  "M.D.;  [facility] SURGERY [facility];  Service: General      Social History     Tobacco Use   • Smoking status: Former Smoker     Packs/day: 0.50     Years: 33.00     Pack years: 16.50     Types: Cigarettes     Last attempt to quit: 2019     Years since quittin.4   • Smokeless tobacco: Never Used   • Tobacco comment: Smoking cessation   Substance Use Topics   • Alcohol use: No     Comment: Sober x 22 years   • Drug use: No     Comment: Sober x 22 years     Family History   Problem Relation Age of Onset   • Hypertension Mother    • Hypertension Father    • Cancer Father    • No Known Problems Sister            ROS  No wt loss no fever  all review of system completed and negative except for those listed above     Objective:     /70 (BP Location: Left arm, Patient Position: Sitting, BP Cuff Size: Adult)   Pulse 78   Temp 37 °C (98.6 °F) (Temporal)   Resp 14   Ht 1.689 m (5' 6.5\")   Wt 56.7 kg (125 lb)   SpO2 98%  Body mass index is 19.87 kg/m².  Physical Exam:          GEN: comfortable, alert and oriented, well nourished, well developed, in no apparent distress   HEENT: NCAT, eyes: pupils equal and reactive, sclera white, EOMIT, good dentition  HEART: limbs warm and well perfused, regular rate, no JVD, no lower extremity edema  LUNGS: speaking in full sentences, not in apparent respiratory distress, no audible wheezes  MSK: normal tone and bulk, no swelling of the joints, gait steady and normal       Skin   R gluteal area has brown raised lesion c/w compound nevi   However she does have 4mm dark brown irregular border irregular color skin lesion gluteal cleft     Assessment and Plan:   The following treatment plan was discussed        Problem List Items Addressed This Visit     Bipolar 2 disorder (HCC)     Takes lamictal   See's psychiatry Dr Marcel Maurice [phone] in [place]   She will coordinate with me and Dr Maurice if changes to contraception     I previously had documented bipolar 1 " disorder which today I am notified is incorrect that instead her diagnosis is bipolar 2                         Atypical pigmented skin lesion     2 atypical features color and border  In the gluteal cleft  I would like her to schedule short term follow up we will re-examine and if growing/changing will plan to do deep shave excision      Over 25 minutes spent with patient face to face, greater than 50% time spent with plan/coordination of care regarding that which is discussed in the HPI and A&P                       Instructed to follow up if symptoms worsen or fail to improve, ER/UC precautions discussed as well    Rosalva Garcia MD  [facility], Family Medicine   [address] [place] [address]   [place] [id number]  Phone: [phone]

## 2019-10-23 ENCOUNTER — HOSPITAL ENCOUNTER (OUTPATIENT)
Dept: LAB | Facility: MEDICAL CENTER | Age: 44
End: 2019-10-23
Attending: FAMILY MEDICINE
Payer: COMMERCIAL

## 2019-10-23 ENCOUNTER — OFFICE VISIT (OUTPATIENT)
Dept: MEDICAL GROUP | Facility: MEDICAL CENTER | Age: 44
End: 2019-10-23
Payer: COMMERCIAL

## 2019-10-23 VITALS
TEMPERATURE: 98.5 F | OXYGEN SATURATION: 99 % | RESPIRATION RATE: 16 BRPM | HEART RATE: 72 BPM | WEIGHT: 127.09 LBS | HEIGHT: 67 IN | DIASTOLIC BLOOD PRESSURE: 68 MMHG | SYSTOLIC BLOOD PRESSURE: 102 MMHG | BODY MASS INDEX: 19.95 KG/M2

## 2019-10-23 DIAGNOSIS — D22.9 NEVUS: ICD-10-CM

## 2019-10-23 DIAGNOSIS — M53.3 COCCYDYNIA: ICD-10-CM

## 2019-10-23 DIAGNOSIS — D48.5 NEOPLASM OF UNCERTAIN BEHAVIOR OF SKIN: ICD-10-CM

## 2019-10-23 LAB — PATHOLOGY CONSULT NOTE: NORMAL

## 2019-10-23 PROCEDURE — 99000 SPECIMEN HANDLING OFFICE-LAB: CPT | Performed by: FAMILY MEDICINE

## 2019-10-23 PROCEDURE — 11401 EXC TR-EXT B9+MARG 0.6-1 CM: CPT | Performed by: FAMILY MEDICINE

## 2019-10-23 PROCEDURE — 99212 OFFICE O/P EST SF 10 MIN: CPT | Mod: 25 | Performed by: FAMILY MEDICINE

## 2019-10-23 PROCEDURE — 88305 TISSUE EXAM BY PATHOLOGIST: CPT

## 2019-10-23 NOTE — ASSESSMENT & PLAN NOTE
PROCEDURE:    After appropriate consent and timeout we proceeded with excisional biopsy.    The direction of the skin tension lines were determined after performing a field block in preparation for an excision. Align the long axis of the excision parallel to the skin tension lines. Using a surgical marking pen,  an ellipse was drawn around the lesion to be excised, including a 2 to 5 mm margin of normal skin around the      Ultimately the incision was extended completely through the dermis and be deep enough to see subcutaneous fat when the sample is removed.     Applied pressure to the wound with gauze in preparation for closing.    Wound was cleaned with gauze, no foreign body seen. .  Patient tolerated procedure well. Wound was dressed with triple antibiotic ointment and bandage. She is advised to leave bandage on for 24 hours.     Follow up 2 weeks for path review

## 2019-10-23 NOTE — ASSESSMENT & PLAN NOTE
New problem   No injury but onset associated with moving her mom to [place] multiple day long road trips  I suggest RICE therapy   And we discuss hemorrhoid pillow and coccodynia pillows

## 2019-10-23 NOTE — PROGRESS NOTES
This medical record contains text that has been entered with the assistance of computer voice recognition and dictation software.  Therefore, it may contain unintended errors in text, spelling, punctuation, or grammar        Chief Complaint   Patient presents with   • Nevus     biopsy       Reanna Ojeda is a 44 y.o. 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     • acetaminophen (TYLENOL) 325 MG Tab Take 650 mg by mouth every four hours as needed.     • lamotrigine (LAMICTAL) 200 MG tablet Take 400 mg by mouth every day.     • traZODone (DESYREL) 100 MG Tab Take 100 mg by mouth every evening.     • Norgestim-Eth Estrad Triphasic (TRINESSA, 28,) 0.18/0.215/0.25 MG-35 MCG Tab Take 1 Tab by mouth every day. 84 Tab 3     No current facility-administered medications for this visit.      Patient Active Problem List    Diagnosis Date Noted   • Neoplasm of uncertain behavior of skin 10/23/2019   • Coccydynia 10/23/2019   • Atypical pigmented skin lesion 10/09/2019   • Perimenopause 08/26/2019   • Annual physical exam 08/26/2019   • Encounter for surveillance of contraceptive pills 08/15/2018   • Primary insomnia 08/15/2018   • Bipolar 2 disorder (HCC) 08/15/2018   • Preventative health care 08/15/2018   • Breast lump in female 08/15/2018     Past Surgical History:   Procedure Laterality Date   • BREAST BIOPSY Left 9/28/2018     "Procedure: BREAST BIOPSY- FOR EXCISION OF BREAST MASS;  Surgeon: Shraddha Moss M.D.;  Location: SURGERY SAME DAY [facility];  Service: General      Social History     Tobacco Use   • Smoking status: Former Smoker     Packs/day: 0.50     Years: 33.00     Pack years: 16.50     Types: Cigarettes     Last attempt to quit: 2019     Years since quittin.5   • Smokeless tobacco: Never Used   • Tobacco comment: Smoking cessation   Substance Use Topics   • Alcohol use: No     Comment: Sober x 22 years   • Drug use: No     Comment: Sober x 22 years     Family History   Problem Relation Age of Onset   • Hypertension Mother    • Hypertension Father    • Cancer Father    • No Known Problems Sister            ROS  No wt loss no fever  all review of system completed and negative except for those listed above     Objective:     /68 (BP Location: Left arm, Patient Position: Sitting)   Pulse 72   Temp 36.9 °C (98.5 °F) (Temporal)   Resp 16   Ht 1.689 m (5' 6.5\")   Wt 57.6 kg (127 lb 1.5 oz)   SpO2 99%  Body mass index is 20.21 kg/m².  Physical Exam:          GEN: comfortable, alert and oriented, well nourished, well developed, in no apparent distress   HEENT: NCAT, eyes: pupils equal and reactive, sclera white, EOMIT, good dentition  HEART: limbs warm and well perfused, regular rate, no JVD, no lower extremity edema  LUNGS: speaking in full sentences, not in apparent respiratory distress, no audible wheezes  MSK: normal tone and bulk, no swelling of the joints, gait steady and normal       Skin   However she does have 6mm dark brown irregular border irregular color skin lesion gluteal cleft     Assessment and Plan:   The following treatment plan was discussed        Problem List Items Addressed This Visit     Neoplasm of uncertain behavior of skin     PROCEDURE:    After appropriate consent and timeout we proceeded with excisional biopsy.    The direction of the skin tension lines were determined after " performing a field block in preparation for an excision. Align the long axis of the excision parallel to the skin tension lines. Using a surgical marking pen,  an ellipse was drawn around the lesion to be excised, including a 2 to 5 mm margin of normal skin around the      Ultimately the incision was extended completely through the dermis and be deep enough to see subcutaneous fat when the sample is removed.     Applied pressure to the wound with gauze in preparation for closing.    Wound was cleaned with gauze, no foreign body seen. .  Patient tolerated procedure well. Wound was dressed with triple antibiotic ointment and bandage. She is advised to leave bandage on for 24 hours.     Follow up 2 weeks for path review                    Relevant Orders    Pathology Specimen    Coccydynia     New problem   No injury but onset associated with moving her mom to [place] multiple day long road trips  I suggest RICE therapy   And we discuss hemorrhoid pillow and coccodynia pillows            Other Visit Diagnoses     Nevus        Relevant Orders    Consent for Surgery / Procedure    Pathology Specimen                Instructed to follow up if symptoms worsen or fail to improve, ER/UC precautions discussed as well    Rosalva Garcia MD  [facility], Family [address]   [place] [address]  Phone: [phone]

## 2019-10-28 ENCOUNTER — TELEPHONE (OUTPATIENT)
Dept: MEDICAL GROUP | Facility: MEDICAL CENTER | Age: 44
End: 2019-10-28

## 2019-10-28 ENCOUNTER — OFFICE VISIT (OUTPATIENT)
Dept: MEDICAL GROUP | Facility: MEDICAL CENTER | Age: 44
End: 2019-10-28
Payer: COMMERCIAL

## 2019-10-28 VITALS
WEIGHT: 127 LBS | HEART RATE: 70 BPM | OXYGEN SATURATION: 98 % | RESPIRATION RATE: 14 BRPM | HEIGHT: 66 IN | SYSTOLIC BLOOD PRESSURE: 108 MMHG | TEMPERATURE: 98.9 F | DIASTOLIC BLOOD PRESSURE: 70 MMHG | BODY MASS INDEX: 20.41 KG/M2

## 2019-10-28 DIAGNOSIS — M53.3 COCCYDYNIA: ICD-10-CM

## 2019-10-28 DIAGNOSIS — L81.9 ATYPICAL PIGMENTED SKIN LESION: ICD-10-CM

## 2019-10-28 DIAGNOSIS — Z63.79 STRESS DUE TO ILLNESS OF FAMILY MEMBER: ICD-10-CM

## 2019-10-28 PROCEDURE — 99212 OFFICE O/P EST SF 10 MIN: CPT | Mod: 24 | Performed by: FAMILY MEDICINE

## 2019-10-28 PROCEDURE — 99024 POSTOP FOLLOW-UP VISIT: CPT | Performed by: FAMILY MEDICINE

## 2019-10-28 NOTE — ASSESSMENT & PLAN NOTE
Wound check   No e/o infection   Healing well   Reassurance provided and after care precautions discussed  Still awaiting path

## 2019-10-28 NOTE — PROGRESS NOTES
This medical record contains text that has been entered with the assistance of computer voice recognition and dictation software.  Therefore, it may contain unintended errors in text, spelling, punctuation, or grammar        Chief Complaint   Patient presents with   • Wound Check     after nevus removal of tailbone        Reanna Ojeda is a 44 y.o. 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   [facility]         She has h/o bipolar 2 well controlled  Commutes to [place] to see her psychiatrist           Ongoing issue   Pigmented nevi gluteal cleft  area observed by accupunturist  We did excisional bx last visit   She is here because she was having a lot of post op pain     She also wants to follow up on coccyodynia             Current Outpatient Medications   Medication Sig Dispense Refill   • norgestrel-ethinyl estradiol (LO-OVRAL) 0.3-30 MG-MCG Tab Take active pills continuously to suppress menses 84 Tab 3   • Naproxen Sodium (ALEVE) 220 MG Cap Take 220 mg by mouth as needed.     • acetaminophen (TYLENOL) 325 MG Tab Take 650 mg by mouth every four hours as needed.     • lamotrigine (LAMICTAL) 200 MG tablet Take 400 mg by mouth every day.     • traZODone (DESYREL) 100 MG Tab Take 100 mg by mouth every evening.     • Norgestim-Eth Estrad Triphasic (TRINESSA, 28,) 0.18/0.215/0.25 MG-35 MCG Tab Take 1 Tab by mouth every day. 84 Tab 3     No current facility-administered medications for this visit.      Patient Active Problem List    Diagnosis Date Noted   • Stress due to illness of family member 10/28/2019   • Neoplasm of uncertain behavior of skin 10/23/2019   • Coccydynia 10/23/2019   • Atypical pigmented skin lesion 10/09/2019   • Perimenopause 08/26/2019   • Annual physical exam 08/26/2019   • Encounter for surveillance of contraceptive pills 08/15/2018   • Primary insomnia 08/15/2018   • Bipolar 2 disorder (HCC) 08/15/2018   • Preventative  "health care 08/15/2018   • Breast lump in female 08/15/2018     Past Surgical History:   Procedure Laterality Date   • BREAST BIOPSY Left 2018    Procedure: BREAST BIOPSY- FOR EXCISION OF BREAST MASS;  Surgeon: Shraddha Moss M.D.;  Location: SURGERY SAME DAY [facility];  Service: General      Social History     Tobacco Use   • Smoking status: Former Smoker     Packs/day: 0.50     Years: 33.00     Pack years: 16.50     Types: Cigarettes     Last attempt to quit: 2019     Years since quittin.5   • Smokeless tobacco: Never Used   • Tobacco comment: Smoking cessation   Substance Use Topics   • Alcohol use: No     Comment: Sober x 22 years   • Drug use: No     Comment: Sober x 22 years     Family History   Problem Relation Age of Onset   • Hypertension Mother    • Hypertension Father    • Cancer Father    • No Known Problems Sister            ROS  No wt loss no fever  all review of system completed and negative except for those listed above     Objective:     /70 (BP Location: Left arm, Patient Position: Sitting, BP Cuff Size: Adult)   Pulse 70   Temp 37.2 °C (98.9 °F) (Temporal)   Resp 14   Ht 1.676 m (5' 6\")   Wt 57.6 kg (127 lb)   SpO2 98%  Body mass index is 20.5 kg/m².  Physical Exam:          GEN: comfortable, alert and oriented, well nourished, well developed, in no apparent distress   HEENT: NCAT, eyes: pupils equal and reactive, sclera white, EOMIT, good dentition  HEART: limbs warm and well perfused, regular rate, no JVD, no lower extremity edema  LUNGS: speaking in full sentences, not in apparent respiratory distress, no audible wheezes  MSK: normal tone and bulk, no swelling of the joints, gait steady and normal       Skin   Wound is healing well some granulation tissue   No warmth no streaking   No pus exudates     Assessment and Plan:   The following treatment plan was discussed        Problem List Items Addressed This Visit     Atypical pigmented skin lesion     Wound check "   No e/o infection   Healing well   Reassurance provided and after care precautions discussed  Still awaiting path              Coccydynia       No injury but onset associated with moving her mom to [place] multiple day long road trips  I have recommended strategies for tail bone rest   I suggest RICE therapy   And we discuss hemorrhoid pillow and coccodynia pillows                Stress due to illness of family member     Family and best friend evacuated in [place] 2/2 fires                           Instructed to follow up if symptoms worsen or fail to improve, ER/UC precautions discussed as well    Rosalva Garcia MD  [facility], Family Medicine   [address] [place] [address]   [place] [address]  Phone: [phone]

## 2019-10-28 NOTE — TELEPHONE ENCOUNTER
VOICEMAIL  1. Caller Name: Reanna Ojeda                      Call Back Number: [phone] (home)     2. Message: Pt called left  requesting results of biopsy.     3. Patient approves office to leave a detailed voicemail/MyChart message: N\A    Phone Number Called: [phone] (home)     Call outcome: spoke to patient regarding message below    Message: Pt notified and no results are ready and states she is having a bit of discomfort where the biopsy is taken. I suggested her come into the office today to get it checked out ans see what going on.

## 2019-10-28 NOTE — ASSESSMENT & PLAN NOTE
No injury but onset associated with moving her mom to [place] multiple day long road trips  I have recommended strategies for tail bone rest   I suggest RICE therapy   And we discuss hemorrhoid pillow and coccodynia pillows

## 2019-10-30 ENCOUNTER — TELEPHONE (OUTPATIENT)
Dept: MEDICAL GROUP | Facility: MEDICAL CENTER | Age: 44
End: 2019-10-30

## 2019-10-30 NOTE — TELEPHONE ENCOUNTER
----- Message from Rosalva Garcia M.D. sent at 10/30/2019  1:32 PM PDT -----  Can you let her know that the skin lesion had mild to moderate atypia but we completely excised it!  So I want her to come back for a 2 mo spot check and that's it   Thanks!

## 2020-01-04 ENCOUNTER — HOSPITAL ENCOUNTER (EMERGENCY)
Facility: MEDICAL CENTER | Age: 45
End: 2020-01-04
Attending: EMERGENCY MEDICINE
Payer: COMMERCIAL

## 2020-01-04 VITALS
SYSTOLIC BLOOD PRESSURE: 107 MMHG | HEIGHT: 66 IN | BODY MASS INDEX: 21.36 KG/M2 | HEART RATE: 87 BPM | OXYGEN SATURATION: 99 % | TEMPERATURE: 97.5 F | DIASTOLIC BLOOD PRESSURE: 62 MMHG | RESPIRATION RATE: 16 BRPM | WEIGHT: 132.94 LBS

## 2020-01-04 DIAGNOSIS — M54.12 CERVICAL RADICULOPATHY: ICD-10-CM

## 2020-01-04 PROCEDURE — 700102 HCHG RX REV CODE 250 W/ 637 OVERRIDE(OP): Performed by: EMERGENCY MEDICINE

## 2020-01-04 PROCEDURE — 99284 EMERGENCY DEPT VISIT MOD MDM: CPT

## 2020-01-04 PROCEDURE — A9270 NON-COVERED ITEM OR SERVICE: HCPCS | Performed by: EMERGENCY MEDICINE

## 2020-01-04 RX ORDER — OXYCODONE HYDROCHLORIDE AND ACETAMINOPHEN 5; 325 MG/1; MG/1
1 TABLET ORAL ONCE
Status: COMPLETED | OUTPATIENT
Start: 2020-01-04 | End: 2020-01-04

## 2020-01-04 RX ORDER — METHYLPREDNISOLONE 4 MG/1
TABLET ORAL
Qty: 1 PACKAGE | Refills: 0 | Status: ON HOLD | OUTPATIENT
Start: 2020-01-04 | End: 2020-05-22

## 2020-01-04 RX ORDER — OXYCODONE HYDROCHLORIDE AND ACETAMINOPHEN 5; 325 MG/1; MG/1
1-2 TABLET ORAL EVERY 4 HOURS PRN
Qty: 20 TAB | Refills: 0 | Status: SHIPPED | OUTPATIENT
Start: 2020-01-04 | End: 2020-01-07

## 2020-01-04 RX ADMIN — OXYCODONE HYDROCHLORIDE AND ACETAMINOPHEN 1 TABLET: 5; 325 TABLET ORAL at 08:50

## 2020-01-04 NOTE — ED PROVIDER NOTES
"ED Provider Note    Scribed for Perry Warner M.D. by Janice Castellon. 2020, 8:19 AM.    Primary care provider: Rosalva Garcia M.D.  Means of arrival: Walked  History obtained from: Patient  History limited by: None    CHIEF COMPLAINT  Chief Complaint   Patient presents with   • Neck Pain     diagnosed c4-c5  \"bulging disc\" 3years ago       HPI  Reanna Ojeda is a 44 y.o. female with a history of chronic neck pain and bulging C4-C5 disks who presents to the Emergency Department with increasing neck pain since just before . She has associated symptoms of neck stiffness, muscle spasms, limited neck range of motion, and tingling in thumb and 1st 2 fingers of left hand. Heat pads, gabapentin, Aleve, and accupuncture mildly alleviate the pain  3 years ago she tried mild traction but it exacerbated her pain. She has used physical therapy in the past. She has been doing accupuncture for the past 2 years which have reduced the frequency of flare ups like what she is currently having. She denies fevers or weakness.     REVIEW OF SYSTEMS  As above otherwise all other systems are negative    PAST MEDICAL HISTORY   has a past medical history of Autoimmune disease (HCC), Bipolar 2 disorder (HCC), and Cervical pain (neck).    SURGICAL HISTORY   has a past surgical history that includes breast biopsy (Left, 2018).    SOCIAL HISTORY  Social History     Tobacco Use   • Smoking status: Former Smoker     Packs/day: 0.50     Years: 33.00     Pack years: 16.50     Types: Cigarettes     Last attempt to quit: 2019     Years since quittin.7   • Smokeless tobacco: Never Used   • Tobacco comment: Smoking cessation   Substance Use Topics   • Alcohol use: No     Comment: Sober x 22 years   • Drug use: No     Comment: Sober x 22 years      Social History     Substance and Sexual Activity   Drug Use No    Comment: Sober x 22 years       FAMILY HISTORY  Family History   Problem Relation Age of Onset   • " "Hypertension Mother    • Hypertension Father    • Cancer Father    • No Known Problems Sister        CURRENT MEDICATIONS  Home Medications     Reviewed by Gisele Colvin R.N. (Registered Nurse) on 01/04/20 at 0755  Med List Status: Complete   Medication Last Dose Status   acetaminophen (TYLENOL) 325 MG Tab 1/4/2020 Active   lamotrigine (LAMICTAL) 200 MG tablet 1/3/2020 Active   Naproxen Sodium (ALEVE) 220 MG Cap 1/4/2020 Active   norgestrel-ethinyl estradiol (LO-OVRAL) 0.3-30 MG-MCG Tab 1/4/2020 Active   sertraline (ZOLOFT) 50 MG Tab 1/4/2020 Active   traZODone (DESYREL) 100 MG Tab 1/3/2020 Active                ALLERGIES  No Known Allergies    PHYSICAL EXAM  VITAL SIGNS: /69   Pulse 84   Temp 36.4 °C (97.5 °F) (Temporal)   Resp 20   Ht 1.676 m (5' 6\")   Wt 60.3 kg (132 lb 15 oz)   LMP 12/31/2019   SpO2 100%   BMI 21.46 kg/m²     Constitutional: Well developed, Well nourished, No acute distress, Non-toxic appearance.   HENT: Normocephalic, Atraumatic, Bilateral external ears normal, oropharynx moist, No oral exudates, Nose normal.   Eyes: Conjunctiva is normal, there are no signs of exudate.   Neck: Supple, no meningeal signs. Exquisitely tender at cervical paraspinal muscles.   Lymphatic: No lymphadenopathy noted.   Cardiovascular: Regular rate and rhythm without murmurs gallops or rubs.   Thorax & Lungs: No respiratory distress. Breathing comfortably. Lungs are clear to auscultation bilaterally, there are no wheezes no rales. Chest wall is nontender.  Abdomen: Soft, nontender, nondistended. Bowel sounds are present.   Skin: Warm, Dry, No erythema,   Back: No tenderness, No CVA tenderness.  Musculoskeletal: Good range of motion in all major joints. No tenderness to palpation or major deformities noted. Intact distal pulses, no clubbing, no cyanosis, no edema,   Neurologic: Alert & oriented x 3, Moving all extremities. CN II-XII grossly intact, DTRs equal in bilateral upper extremities, Symptomatic " in the C6 distribution pattern, normal sensation to pinprick and soft touch, motor 5/5 in upper extremities   Psychiatric: Affect normal, Judgment normal, Mood normal.     COURSE & MEDICAL DECISION MAKING  Pertinent Labs & Imaging studies reviewed. (See chart for details)    8:19 AM - Patient seen and examined at bedside. Patient will be treated with a prednisone dose pack and Percocet. Ordered MRI as outpatient procedure.  The patient had concerns about taking the medications, we discussed side effects, potential interactions with her chronic medications, how to appropriately dose the medications, and addressed all of her concerns. The differential diagnoses include but are not limited to: cervical radiculopathy     Decision Making:  Patient presents emerged department for evaluation.  Clinically the patient has normal motor function throughout.  The patient does have some mild sensory changes to the C6 distribution to the left arm.  At this point I do feels a C6 radiculopathy.  Currently there is no signs of central cord compression.  Patient was given a Percocet with pain control.  This point I will start the patient on steroids recommend for the patient to follow-up with Dr. Ojeda who is on for neurosurgery.  I will schedule for an outpatient MRI to be done.  Patient is to return if any symptoms worsen.    I reviewed prescription monitoring program for patient's narcotic use before prescribing a scheduled drug.The patient will not drink alcohol nor drive with prescribed medications    In prescribing controlled substances to this patient, I certify that I have obtained and reviewed the medical history this patient I have also made a good amy effort to obtain applicable records from other providers who have treated the patient and records did not demonstrate any increased risk of substance abuse that would prevent me from prescribing controlled substances.     I have conducted a physical exam and documented it.  I have reviewed Ms. Ojeda’s prescription history as maintained by the [place] Prescription Monitoring Program.     I have assessed the patient’s risk for abuse, dependency, and addiction using the validated Opioid Risk Tool available at https://www.mdcalc.com/lqwcni-oxzg-fsbs-ort-narcotic-abuse.     Given the above, I believe the benefits of controlled substance therapy outweigh the risks. The reasons for prescribing controlled substances include in my professional opinion, controlled substances are a reasonable choice for this patient. Accordingly, I have discussed the risk and benefits, treatment plan, and alternative therapies with the patient. The patient has been consented for the medication and understands the risks.    DISPOSITION:  Patient will be discharged home in stable condition.    FOLLOW UP:  Jonel Ojeda M.D.  [address]  [facility] [id number] [phone]            OUTPATIENT MEDICATIONS:  Discharge Medication List as of 1/4/2020  9:00 AM      START taking these medications    Details   methylPREDNISolone (MEDROL DOSEPAK) 4 MG Tablet Therapy Pack Please dispense a medrol dose pack and use as directed, Disp-1 Package, R-0, Print Rx Paper      oxyCODONE-acetaminophen (PERCOCET) 5-325 MG Tab Take 1-2 Tabs by mouth every four hours as needed for up to 3 days., Disp-20 Tab, R-0, Print Rx Paper              FINAL IMPRESSION  1. Cervical radiculopathy          Janice BECKER (Scribmago), am scribing for, and in the presence of, Perry Warner M.D..    Electronically signed by: Janice Easton), 1/4/2020    IPerry M.D. personally performed the services described in this documentation, as scribed by Janice Castellon in my presence, and it is both accurate and complete.    E    The note accurately reflects work and decisions made by me.  Perry Warner  1/4/2020  9:51 AM

## 2020-01-04 NOTE — ED NOTES
Patient awake, alert, VSS on RA, pain relief reported, given discharge instructions, follow up information and prescriptions x 2, instructed not to drive home or while taking narcotics, verbalized understanding, consent for narcotics signed, discharged to family, ambulatory out of ED w/steady gait.

## 2020-01-04 NOTE — ED TRIAGE NOTES
"Chief Complaint   Patient presents with   • Neck Pain     diagnosed c4-c5  \"bulging disc\" 3years ago     Pt ambulated to triage, pt has chronic neck pain diagnosed with bulging disc c4-c5, denies injury. Pain worst this morning with numbness left arm x3wks.   "

## 2020-01-06 ENCOUNTER — OFFICE VISIT (OUTPATIENT)
Dept: MEDICAL GROUP | Facility: MEDICAL CENTER | Age: 45
End: 2020-01-06
Payer: COMMERCIAL

## 2020-01-06 VITALS
HEIGHT: 66 IN | OXYGEN SATURATION: 98 % | HEART RATE: 78 BPM | SYSTOLIC BLOOD PRESSURE: 120 MMHG | TEMPERATURE: 98.8 F | BODY MASS INDEX: 21.15 KG/M2 | WEIGHT: 131.61 LBS | DIASTOLIC BLOOD PRESSURE: 78 MMHG

## 2020-01-06 DIAGNOSIS — M54.2 NECK PAIN: ICD-10-CM

## 2020-01-06 PROCEDURE — 99214 OFFICE O/P EST MOD 30 MIN: CPT | Performed by: FAMILY MEDICINE

## 2020-01-06 RX ORDER — KETOROLAC TROMETHAMINE 30 MG/ML
60 INJECTION, SOLUTION INTRAMUSCULAR; INTRAVENOUS ONCE
Status: COMPLETED | OUTPATIENT
Start: 2020-01-06 | End: 2020-01-06

## 2020-01-06 RX ADMIN — KETOROLAC TROMETHAMINE 60 MG: 30 INJECTION, SOLUTION INTRAMUSCULAR; INTRAVENOUS at 14:56

## 2020-01-06 ASSESSMENT — PAIN SCALES - GENERAL: PAINLEVEL: 8=MODERATE-SEVERE PAIN

## 2020-01-06 NOTE — ASSESSMENT & PLAN NOTE
Seen in ER.   Has appt with neurosurgery today at 4pm.   Given oxy  Taking 10mg every 4 hrs currently.   Having numbness and weakness in the left hand as well.   Heat is helpful  No trauma or accidents.

## 2020-01-07 ENCOUNTER — HOSPITAL ENCOUNTER (OUTPATIENT)
Dept: RADIOLOGY | Facility: MEDICAL CENTER | Age: 45
End: 2020-01-07
Attending: NEUROLOGICAL SURGERY
Payer: COMMERCIAL

## 2020-01-07 DIAGNOSIS — M54.12 CERVICAL RADICULOPATHY: ICD-10-CM

## 2020-01-07 PROCEDURE — 72141 MRI NECK SPINE W/O DYE: CPT

## 2020-01-07 NOTE — PROGRESS NOTES
Subjective:     CC: The encounter diagnosis was Neck pain.    HPI: Patient is a 44 y.o. female established patient who presents today for hospital/ER follow-up.  The patient was seen with severe neck pain and numbness in the left arm.      Neck pain  Seen in ER on 2020.  Has appt with neurosurgery today at 4pm.   Given oxy in the ER, taking 10mg every 4 hrs currently.  Still with severe shooting pains in the left neck down the left arm.  Having numbness and weakness in the left hand as well.   Heat is helpful  No trauma or accidents.   No imaging done in the ER.      Past Medical History:   Diagnosis Date   • Autoimmune disease (HCC)     fibromyalgia   • Bipolar 2 disorder (HCC)     depression, anxiety   • Cervical pain (neck)     c4-c6       Social History     Tobacco Use   • Smoking status: Former Smoker     Packs/day: 0.50     Years: 33.00     Pack years: 16.50     Types: Cigarettes     Last attempt to quit: 2019     Years since quittin.7   • Smokeless tobacco: Never Used   • Tobacco comment: Smoking cessation   Substance Use Topics   • Alcohol use: No     Comment: Sober x 22 years   • Drug use: No     Comment: Sober x 22 years       Current Outpatient Medications Ordered in Epic   Medication Sig Dispense Refill   • sertraline (ZOLOFT) 50 MG Tab Take 50 mg by mouth every day.     • methylPREDNISolone (MEDROL DOSEPAK) 4 MG Tablet Therapy Pack Please dispense a medrol dose pack and use as directed 1 Package 0   • oxyCODONE-acetaminophen (PERCOCET) 5-325 MG Tab Take 1-2 Tabs by mouth every four hours as needed for up to 3 days. 20 Tab 0   • norgestrel-ethinyl estradiol (LO-OVRAL) 0.3-30 MG-MCG Tab Take active pills continuously to suppress menses 84 Tab 3   • Naproxen Sodium (ALEVE) 220 MG Cap Take 220 mg by mouth as needed.     • lamotrigine (LAMICTAL) 200 MG tablet Take 400 mg by mouth every day.     • traZODone (DESYREL) 100 MG Tab Take 100 mg by mouth every evening.     • acetaminophen (TYLENOL) 325  "MG Tab Take 650 mg by mouth every four hours as needed.       No current Epic-ordered facility-administered medications on file.        Allergies:  Patient has no known allergies.    ROS:  Pulm: no sob, no cough  CV: no chest pain, no palpitations        Objective:       Exam:  /78 (BP Location: Left arm, Patient Position: Sitting, BP Cuff Size: Adult)   Pulse 78   Temp 37.1 °C (98.8 °F) (Temporal)   Ht 1.664 m (5' 5.5\")   Wt 59.7 kg (131 lb 9.8 oz)   LMP 12/31/2019   SpO2 98%   BMI 21.57 kg/m²  Body mass index is 21.57 kg/m².    General: Normal appearing.  Moderate distress.  Nontoxic.  HEAD: NCAT  EYES: conjunctiva clear, lids without ptosis, pupils equal and reactive to light  EARS: ears normal shape and contour.  MOUTH: normal dentition   Neck: Extremely stiff neck, decreased range of motion.   Pulmonary: Normal effort. Normal respiratory rate.  Cardiovascular: Well perfused. No LE edema  Neurologic: Slightly less  strength in the left hand.  Otherwise normal movements.  Skin: Warm and dry.  No obvious lesions.  Musculoskeletal: Normal gait and station.   Psych: Normal mood and affect. Alert and oriented x3. Judgment and insight is normal.    Assessment & Plan:     44 y.o. female with the following -     1. Neck pain  New problem.  The patient reports extremely severe pain in the left neck rating down the left arm with weakness and numbness in the left hand.  Evaluated in the ER, no imaging was done.  However, the patient does have an appointment with neurosurgery later today.  However, the patient is having extreme pain.  She is already taking oxycodone 10 mg every 4 hours.  I advised I do not feel comfortable giving her further opioid medications.  I offered a Toradol injection which the patient agreed with.  That was given.  I advised tomorrow she can start taking NSAIDs along with her Oxy/acetaminophen.  As the patient is seeing neurosurgery later today they will likely order imaging.  - " ketorolac (TORADOL) injection 60 mg      Return if symptoms worsen or fail to improve.    Please note that this dictation was created using voice recognition software. I have made every reasonable attempt to correct obvious errors, but I expect that there are errors of grammar and possibly content that I did not discover before finalizing the note.

## 2020-02-05 ENCOUNTER — OFFICE VISIT (OUTPATIENT)
Dept: MEDICAL GROUP | Facility: MEDICAL CENTER | Age: 45
End: 2020-02-05
Payer: COMMERCIAL

## 2020-02-05 VITALS
SYSTOLIC BLOOD PRESSURE: 108 MMHG | DIASTOLIC BLOOD PRESSURE: 68 MMHG | TEMPERATURE: 98.9 F | WEIGHT: 132 LBS | OXYGEN SATURATION: 97 % | HEART RATE: 86 BPM | RESPIRATION RATE: 16 BRPM | BODY MASS INDEX: 21.21 KG/M2 | HEIGHT: 66 IN

## 2020-02-05 DIAGNOSIS — M54.2 NECK PAIN: ICD-10-CM

## 2020-02-05 DIAGNOSIS — N92.0 SPOTTING: ICD-10-CM

## 2020-02-05 DIAGNOSIS — N95.1 PERIMENOPAUSE: ICD-10-CM

## 2020-02-05 DIAGNOSIS — Z63.79 STRESS DUE TO ILLNESS OF FAMILY MEMBER: ICD-10-CM

## 2020-02-05 LAB
APPEARANCE UR: NORMAL
BILIRUB UR STRIP-MCNC: NEGATIVE MG/DL
COLOR UR AUTO: NORMAL
GLUCOSE UR STRIP.AUTO-MCNC: NEGATIVE MG/DL
INT CON NEG: NEGATIVE
INT CON POS: POSITIVE
KETONES UR STRIP.AUTO-MCNC: NEGATIVE MG/DL
LEUKOCYTE ESTERASE UR QL STRIP.AUTO: NEGATIVE
NITRITE UR QL STRIP.AUTO: NEGATIVE
PH UR STRIP.AUTO: 5 [PH] (ref 5–8)
POC URINE PREGNANCY TEST: NEGATIVE
PROT UR QL STRIP: NORMAL MG/DL
RBC UR QL AUTO: NEGATIVE
SP GR UR STRIP.AUTO: 1.03
UROBILINOGEN UR STRIP-MCNC: 0.2 MG/DL

## 2020-02-05 PROCEDURE — 81025 URINE PREGNANCY TEST: CPT | Performed by: FAMILY MEDICINE

## 2020-02-05 PROCEDURE — 81002 URINALYSIS NONAUTO W/O SCOPE: CPT | Performed by: FAMILY MEDICINE

## 2020-02-05 PROCEDURE — 99214 OFFICE O/P EST MOD 30 MIN: CPT | Performed by: FAMILY MEDICINE

## 2020-02-05 RX ORDER — GABAPENTIN 300 MG/1
600 CAPSULE ORAL 3 TIMES DAILY
COMMUNITY
Start: 2020-01-13 | End: 2021-09-28

## 2020-02-05 NOTE — PROGRESS NOTES
This medical record contains text that has been entered with the assistance of computer voice recognition and dictation software.  Therefore, it may contain unintended errors in text, spelling, punctuation, or grammar        Chief Complaint   Patient presents with   • Follow-Up   • Contraception     been having light flow for the last two months       Reanna Ojeda is a 44 y.o. 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 Ojeda     2)  dx with renal cancer and had resection < 6 mo ago     3) intermittent spotting past couple of months wanting /wondering if we should switch birth control no pelvic pain bloating               Current Outpatient Medications   Medication Sig Dispense Refill   • gabapentin (NEURONTIN) 300 MG Cap 300 mg 3 times a day.     • Cyclobenzaprine HCl (FLEXERIL PO) Take  by mouth. As needed     • sertraline (ZOLOFT) 50 MG Tab Take 50 mg by mouth every day.     • norgestrel-ethinyl estradiol (LO-OVRAL) 0.3-30 MG-MCG Tab Take active pills continuously to suppress menses 84 Tab 3   • Naproxen Sodium (ALEVE) 220 MG Cap Take 220 mg by mouth as needed.     • acetaminophen (TYLENOL) 325 MG Tab Take 650 mg by mouth every four hours as needed.     • lamotrigine (LAMICTAL) 200 MG tablet Take 400 mg by mouth every day.     • traZODone (DESYREL) 100 MG Tab Take 100 mg by mouth every evening.     • methylPREDNISolone (MEDROL DOSEPAK) 4 MG Tablet Therapy Pack Please dispense a medrol dose pack and use as directed (Patient not taking: Reported on 2/5/2020) 1 Package 0     No current facility-administered medications for this visit.      Patient Active Problem List    Diagnosis Date  "Noted   • Spotting 2020   • Neck pain 2020   • Stress due to illness of family member 10/28/2019   • Neoplasm of uncertain behavior of skin 10/23/2019   • Coccydynia 10/23/2019   • Atypical pigmented skin lesion 10/09/2019   • Perimenopause 2019   • Annual physical exam 2019   • Encounter for surveillance of contraceptive pills 08/15/2018   • Primary insomnia 08/15/2018   • Bipolar 2 disorder (HCC) 08/15/2018   • Preventative health care 08/15/2018   • Breast lump in female 08/15/2018     Past Surgical History:   Procedure Laterality Date   • BREAST BIOPSY Left 2018    Procedure: BREAST BIOPSY- FOR EXCISION OF BREAST MASS;  Surgeon: Shraddha Moss M.D.;  Location: SURGERY SAME DAY [facility];  Service: General      Social History     Tobacco Use   • Smoking status: Former Smoker     Packs/day: 0.50     Years: 33.00     Pack years: 16.50     Types: Cigarettes     Last attempt to quit: 2019     Years since quittin.8   • Smokeless tobacco: Never Used   • Tobacco comment: Smoking cessation   Substance Use Topics   • Alcohol use: No     Comment: Sober x 22 years   • Drug use: No     Comment: Sober x 22 years     Family History   Problem Relation Age of Onset   • Hypertension Mother    • Hypertension Father    • Cancer Father    • No Known Problems Sister            ROS  No wt loss no fever  all review of system completed and negative except for those listed above     Objective:     /68 (BP Location: Right arm, Patient Position: Sitting, BP Cuff Size: Adult long)   Pulse 86   Temp 37.2 °C (98.9 °F) (Temporal)   Resp 16   Ht 1.664 m (5' 5.5\")   Wt 59.9 kg (132 lb)   SpO2 97%  Body mass index is 21.63 kg/m².  Physical Exam:          GEN: comfortable, alert and oriented, well nourished, well developed, in no apparent distress   HEENT: NCAT, eyes: pupils equal and reactive, sclera white, EOMIT, good dentition  HEART: limbs warm and well perfused, regular rate, no JVD, no " lower extremity edema  LUNGS: speaking in full sentences, not in apparent respiratory distress, no audible wheezes  MSK: normal tone and bulk, no swelling of the joints, gait steady and normal         Assessment and Plan:   The following treatment plan was discussed        Problem List Items Addressed This Visit     Perimenopause     We started ocp about 6 mo ago for contraception   She is taking active pills continuously had has occasional breakthrough bleeding   I suggest we try taking pills per package instructions   If intermenstrual bleeding continues I would recommend she do labs pelvic US and follow up with me in clinic 40 min visit for EMBx pap and/or gyn ref                         Relevant Orders    FSH/LH    TSH    US-PELVIC TRANSVAGINAL ONLY    DHEA    Comp Metabolic Panel    HCG QUALITATIVE UR    POCT Pregnancy (Completed)    POCT Urinalysis    Stress due to illness of family member      just diagnosed with renal cancer blood in UA   She is asking for UA today       Also mom had head and neck cancer last year has medicare looking for doctor      Over 25 minutes spent with patient face to face, greater than 50% time spent with plan/coordination of care regarding that which is discussed in the HPI and A&P             Neck pain     Seen in ER.   Has been working with neurosurgery and physiatry with [facility]   Trying steroid injection first trying to hold off on surgery   However she has been offered discectomy   Given oxy  Taking 10mg every 4 hrs currently.   Having numbness and weakness in the left hand as well.   No trauma or accidents.   Wants my opinion                Spotting    Relevant Orders    FSH/LH    TSH    US-PELVIC TRANSVAGINAL ONLY    DHEA    Comp Metabolic Panel    HCG QUALITATIVE UR    POCT Pregnancy (Completed)    POCT Urinalysis                Instructed to follow up if symptoms worsen or fail to improve, ER/UC precautions discussed as well    Rosalva Garcia MD  [facility]  [facility], Family Medicine   [address] [place] [address] [address]kw[address]   [address] [place] [address]  Phone: [phone]

## 2020-02-05 NOTE — ASSESSMENT & PLAN NOTE
just diagnosed with renal cancer blood in UA   She is asking for UA today       Also mom had head and neck cancer last year has medicare looking for doctor      Over 25 minutes spent with patient face to face, greater than 50% time spent with plan/coordination of care regarding that which is discussed in the HPI and A&P

## 2020-02-05 NOTE — ASSESSMENT & PLAN NOTE
Seen in ER.   Has been working with neurosurgery and physiatry with [facility] neurosurgery   Trying steroid injection first trying to hold off on surgery   However she has been offered discectomy   Given oxy  Taking 10mg every 4 hrs currently.   Having numbness and weakness in the left hand as well.   No trauma or accidents.   Wants my opinion

## 2020-02-05 NOTE — ASSESSMENT & PLAN NOTE
We started ocp about 6 mo ago for contraception   She is taking active pills continuously had has occasional breakthrough bleeding   I suggest we try taking pills per package instructions   If intermenstrual bleeding continues I would recommend she do labs pelvic US and follow up with me in clinic 40 min visit for EMBx pap and/or gyn ref

## 2020-02-12 ENCOUNTER — HOSPITAL ENCOUNTER (OUTPATIENT)
Dept: PAIN MANAGEMENT | Facility: REHABILITATION | Age: 45
End: 2020-02-12
Attending: ANESTHESIOLOGY
Payer: COMMERCIAL

## 2020-02-12 ENCOUNTER — HOSPITAL ENCOUNTER (OUTPATIENT)
Dept: RADIOLOGY | Facility: REHABILITATION | Age: 45
End: 2020-02-12
Attending: ANESTHESIOLOGY
Payer: COMMERCIAL

## 2020-02-12 VITALS
HEART RATE: 64 BPM | WEIGHT: 131.84 LBS | TEMPERATURE: 98.4 F | DIASTOLIC BLOOD PRESSURE: 71 MMHG | BODY MASS INDEX: 20.69 KG/M2 | SYSTOLIC BLOOD PRESSURE: 122 MMHG | RESPIRATION RATE: 20 BRPM | OXYGEN SATURATION: 100 % | HEIGHT: 67 IN

## 2020-02-12 PROCEDURE — 700111 HCHG RX REV CODE 636 W/ 250 OVERRIDE (IP)

## 2020-02-12 PROCEDURE — 99152 MOD SED SAME PHYS/QHP 5/>YRS: CPT

## 2020-02-12 PROCEDURE — 700101 HCHG RX REV CODE 250

## 2020-02-12 PROCEDURE — 700117 HCHG RX CONTRAST REV CODE 255

## 2020-02-12 PROCEDURE — 64479 NJX AA&/STRD TFRM EPI C/T 1: CPT

## 2020-02-12 RX ORDER — MIDAZOLAM HYDROCHLORIDE 1 MG/ML
INJECTION INTRAMUSCULAR; INTRAVENOUS
Status: COMPLETED
Start: 2020-02-12 | End: 2020-02-12

## 2020-02-12 RX ORDER — DEXAMETHASONE SODIUM PHOSPHATE 10 MG/ML
INJECTION, SOLUTION INTRAMUSCULAR; INTRAVENOUS
Status: COMPLETED
Start: 2020-02-12 | End: 2020-02-12

## 2020-02-12 RX ORDER — LIDOCAINE HYDROCHLORIDE 20 MG/ML
INJECTION, SOLUTION EPIDURAL; INFILTRATION; INTRACAUDAL; PERINEURAL
Status: COMPLETED
Start: 2020-02-12 | End: 2020-02-12

## 2020-02-12 RX ADMIN — MIDAZOLAM HYDROCHLORIDE 1.5 MG: 1 INJECTION, SOLUTION INTRAMUSCULAR; INTRAVENOUS at 14:34

## 2020-02-12 RX ADMIN — LIDOCAINE HYDROCHLORIDE 1 ML: 20 INJECTION, SOLUTION EPIDURAL; INFILTRATION; INTRACAUDAL; PERINEURAL at 14:43

## 2020-02-12 RX ADMIN — DEXAMETHASONE SODIUM PHOSPHATE 10 MG: 10 INJECTION, SOLUTION INTRAMUSCULAR; INTRAVENOUS at 14:42

## 2020-02-12 RX ADMIN — IOHEXOL 2 ML: 240 INJECTION, SOLUTION INTRATHECAL; INTRAVASCULAR; INTRAVENOUS; ORAL at 14:40

## 2020-02-12 NOTE — NON-PROVIDER
Preprocedure assessment completed.  Pertinent health information, healthy communicated to physician and staff during time out.  Patient positioned preprocedure by RN, CST, and XRAY.  Pillow under knees for support.

## 2020-02-12 NOTE — NON-PROVIDER
Pt given verbal and written d/c instructions including verbal infection prevention information and s/s of post procedure infection and verbalizes understanding. denies nsaid use in last 3 days, denies blood thinners use in last 5 days, denies current infection or abx use. Med rec complete. Pt has post procedural ride home with  [facility].

## 2020-05-18 ENCOUNTER — APPOINTMENT (OUTPATIENT)
Dept: ADMISSIONS | Facility: MEDICAL CENTER | Age: 45
DRG: 518 | End: 2020-05-18
Attending: NEUROLOGICAL SURGERY
Payer: COMMERCIAL

## 2020-05-18 ENCOUNTER — HOSPITAL ENCOUNTER (OUTPATIENT)
Dept: RADIOLOGY | Facility: MEDICAL CENTER | Age: 45
DRG: 518 | End: 2020-05-18
Attending: NEUROLOGICAL SURGERY | Admitting: NEUROLOGICAL SURGERY
Payer: COMMERCIAL

## 2020-05-18 DIAGNOSIS — Z01.812 PRE-OPERATIVE LABORATORY EXAMINATION: ICD-10-CM

## 2020-05-18 DIAGNOSIS — Z01.810 PRE-OPERATIVE CARDIOVASCULAR EXAMINATION: ICD-10-CM

## 2020-05-18 DIAGNOSIS — Z01.811 PRE-OPERATIVE RESPIRATORY EXAMINATION: ICD-10-CM

## 2020-05-18 LAB
ANION GAP SERPL CALC-SCNC: 12 MMOL/L (ref 7–16)
APTT PPP: 24.5 SEC (ref 24.7–36)
BASOPHILS # BLD AUTO: 0.8 % (ref 0–1.8)
BASOPHILS # BLD: 0.04 K/UL (ref 0–0.12)
BUN SERPL-MCNC: 15 MG/DL (ref 8–22)
CALCIUM SERPL-MCNC: 9.7 MG/DL (ref 8.5–10.5)
CHLORIDE SERPL-SCNC: 105 MMOL/L (ref 96–112)
CO2 SERPL-SCNC: 25 MMOL/L (ref 20–33)
COVID ORDER STATUS COVID19: NORMAL
CREAT SERPL-MCNC: 0.76 MG/DL (ref 0.5–1.4)
EOSINOPHIL # BLD AUTO: 0.06 K/UL (ref 0–0.51)
EOSINOPHIL NFR BLD: 1.2 % (ref 0–6.9)
ERYTHROCYTE [DISTWIDTH] IN BLOOD BY AUTOMATED COUNT: 43.8 FL (ref 35.9–50)
GLUCOSE SERPL-MCNC: 102 MG/DL (ref 65–99)
HCT VFR BLD AUTO: 43.7 % (ref 37–47)
HGB BLD-MCNC: 13.7 G/DL (ref 12–16)
IMM GRANULOCYTES # BLD AUTO: 0.02 K/UL (ref 0–0.11)
IMM GRANULOCYTES NFR BLD AUTO: 0.4 % (ref 0–0.9)
INR PPP: 0.97 (ref 0.87–1.13)
LYMPHOCYTES # BLD AUTO: 1 K/UL (ref 1–4.8)
LYMPHOCYTES NFR BLD: 20.1 % (ref 22–41)
MCH RBC QN AUTO: 31.1 PG (ref 27–33)
MCHC RBC AUTO-ENTMCNC: 31.4 G/DL (ref 33.6–35)
MCV RBC AUTO: 99.1 FL (ref 81.4–97.8)
MONOCYTES # BLD AUTO: 0.5 K/UL (ref 0–0.85)
MONOCYTES NFR BLD AUTO: 10.1 % (ref 0–13.4)
NEUTROPHILS # BLD AUTO: 3.35 K/UL (ref 2–7.15)
NEUTROPHILS NFR BLD: 67.4 % (ref 44–72)
NRBC # BLD AUTO: 0 K/UL
NRBC BLD-RTO: 0 /100 WBC
PLATELET # BLD AUTO: 279 K/UL (ref 164–446)
PMV BLD AUTO: 10.3 FL (ref 9–12.9)
POTASSIUM SERPL-SCNC: 4.5 MMOL/L (ref 3.6–5.5)
PROTHROMBIN TIME: 13 SEC (ref 12–14.6)
RBC # BLD AUTO: 4.41 M/UL (ref 4.2–5.4)
SODIUM SERPL-SCNC: 142 MMOL/L (ref 135–145)
WBC # BLD AUTO: 5 K/UL (ref 4.8–10.8)

## 2020-05-18 PROCEDURE — 85730 THROMBOPLASTIN TIME PARTIAL: CPT

## 2020-05-18 PROCEDURE — 36415 COLL VENOUS BLD VENIPUNCTURE: CPT

## 2020-05-18 PROCEDURE — 80048 BASIC METABOLIC PNL TOTAL CA: CPT

## 2020-05-18 PROCEDURE — C9803 HOPD COVID-19 SPEC COLLECT: HCPCS

## 2020-05-18 PROCEDURE — 93005 ELECTROCARDIOGRAM TRACING: CPT

## 2020-05-18 PROCEDURE — 85610 PROTHROMBIN TIME: CPT

## 2020-05-18 PROCEDURE — 71046 X-RAY EXAM CHEST 2 VIEWS: CPT

## 2020-05-18 PROCEDURE — 85025 COMPLETE CBC W/AUTO DIFF WBC: CPT

## 2020-05-18 RX ORDER — SERTRALINE HYDROCHLORIDE 25 MG/1
100 TABLET, FILM COATED ORAL DAILY
COMMUNITY

## 2020-05-19 LAB
EKG IMPRESSION: NORMAL
SARS-COV-2 RNA RESP QL NAA+PROBE: NOT DETECTED
SPECIMEN SOURCE: NORMAL

## 2020-05-19 PROCEDURE — 93010 ELECTROCARDIOGRAM REPORT: CPT | Performed by: INTERNAL MEDICINE

## 2020-05-21 NOTE — OR NURSING
COVID-19 Pre-surgery screenin. Do you have an undiagnosed respiratory illness or symptoms such as coughing or sneezing? NO  a. Onset of Sx   b. Acute vs. chronic respiratory illness        2. Do you have an unexplained fever greater than 100.4 degrees Fahrenheit or 38 degrees Celsius?                     NO     3. Have you had direct exposure to a patient who tested positive for Covid-19?                           NO     4. Have you traveled within the last 14 days to [place], [place], [place], [place], or [place]?                     NO     Informed of visitor policy

## 2020-05-22 ENCOUNTER — APPOINTMENT (OUTPATIENT)
Dept: RADIOLOGY | Facility: MEDICAL CENTER | Age: 45
DRG: 518 | End: 2020-05-22
Attending: NEUROLOGICAL SURGERY
Payer: COMMERCIAL

## 2020-05-22 ENCOUNTER — ANESTHESIA (OUTPATIENT)
Dept: SURGERY | Facility: MEDICAL CENTER | Age: 45
DRG: 518 | End: 2020-05-22
Payer: COMMERCIAL

## 2020-05-22 ENCOUNTER — ANESTHESIA EVENT (OUTPATIENT)
Dept: SURGERY | Facility: MEDICAL CENTER | Age: 45
DRG: 518 | End: 2020-05-22
Payer: COMMERCIAL

## 2020-05-22 ENCOUNTER — HOSPITAL ENCOUNTER (INPATIENT)
Facility: MEDICAL CENTER | Age: 45
LOS: 1 days | DRG: 518 | End: 2020-05-23
Attending: NEUROLOGICAL SURGERY | Admitting: NEUROLOGICAL SURGERY
Payer: COMMERCIAL

## 2020-05-22 DIAGNOSIS — G89.18 POSTOPERATIVE PAIN: ICD-10-CM

## 2020-05-22 DIAGNOSIS — M54.2 NECK PAIN: Primary | ICD-10-CM

## 2020-05-22 LAB — HCG UR QL: NEGATIVE

## 2020-05-22 PROCEDURE — 700105 HCHG RX REV CODE 258: Performed by: ANESTHESIOLOGY

## 2020-05-22 PROCEDURE — 160009 HCHG ANES TIME/MIN: Performed by: NEUROLOGICAL SURGERY

## 2020-05-22 PROCEDURE — 700102 HCHG RX REV CODE 250 W/ 637 OVERRIDE(OP): Performed by: ANESTHESIOLOGY

## 2020-05-22 PROCEDURE — 700101 HCHG RX REV CODE 250: Performed by: PHYSICIAN ASSISTANT

## 2020-05-22 PROCEDURE — C1713 ANCHOR/SCREW BN/BN,TIS/BN: HCPCS | Performed by: NEUROLOGICAL SURGERY

## 2020-05-22 PROCEDURE — 700102 HCHG RX REV CODE 250 W/ 637 OVERRIDE(OP): Performed by: NEUROLOGICAL SURGERY

## 2020-05-22 PROCEDURE — 500389 HCHG DRAIN, RESERVOIR SUCT JP 100CC: Performed by: NEUROLOGICAL SURGERY

## 2020-05-22 PROCEDURE — 700101 HCHG RX REV CODE 250: Performed by: NEUROLOGICAL SURGERY

## 2020-05-22 PROCEDURE — 502000 HCHG MISC OR IMPLANTS RC 0278: Performed by: NEUROLOGICAL SURGERY

## 2020-05-22 PROCEDURE — 500002 HCHG ADHESIVE, DERMABOND: Performed by: NEUROLOGICAL SURGERY

## 2020-05-22 PROCEDURE — 160036 HCHG PACU - EA ADDL 30 MINS PHASE I: Performed by: NEUROLOGICAL SURGERY

## 2020-05-22 PROCEDURE — 500371 HCHG DRAIN, BLAKE 10MM: Performed by: NEUROLOGICAL SURGERY

## 2020-05-22 PROCEDURE — 160002 HCHG RECOVERY MINUTES (STAT): Performed by: NEUROLOGICAL SURGERY

## 2020-05-22 PROCEDURE — A9270 NON-COVERED ITEM OR SERVICE: HCPCS | Performed by: NEUROLOGICAL SURGERY

## 2020-05-22 PROCEDURE — 770006 HCHG ROOM/CARE - MED/SURG/GYN SEMI*

## 2020-05-22 PROCEDURE — 72040 X-RAY EXAM NECK SPINE 2-3 VW: CPT

## 2020-05-22 PROCEDURE — A9270 NON-COVERED ITEM OR SERVICE: HCPCS | Performed by: ANESTHESIOLOGY

## 2020-05-22 PROCEDURE — 160029 HCHG SURGERY MINUTES - 1ST 30 MINS LEVEL 4: Performed by: NEUROLOGICAL SURGERY

## 2020-05-22 PROCEDURE — A9270 NON-COVERED ITEM OR SERVICE: HCPCS | Performed by: PHYSICIAN ASSISTANT

## 2020-05-22 PROCEDURE — 160035 HCHG PACU - 1ST 60 MINS PHASE I: Performed by: NEUROLOGICAL SURGERY

## 2020-05-22 PROCEDURE — 700101 HCHG RX REV CODE 250: Performed by: ANESTHESIOLOGY

## 2020-05-22 PROCEDURE — 700105 HCHG RX REV CODE 258: Performed by: NEUROLOGICAL SURGERY

## 2020-05-22 PROCEDURE — 0RR30JZ REPLACEMENT OF CERVICAL VERTEBRAL DISC WITH SYNTHETIC SUBSTITUTE, OPEN APPROACH: ICD-10-PCS | Performed by: NEUROLOGICAL SURGERY

## 2020-05-22 PROCEDURE — 700112 HCHG RX REV CODE 229: Performed by: PHYSICIAN ASSISTANT

## 2020-05-22 PROCEDURE — 700111 HCHG RX REV CODE 636 W/ 250 OVERRIDE (IP): Performed by: PHYSICIAN ASSISTANT

## 2020-05-22 PROCEDURE — 500864 HCHG NEEDLE, SPINAL 18G: Performed by: NEUROLOGICAL SURGERY

## 2020-05-22 PROCEDURE — 501838 HCHG SUTURE GENERAL: Performed by: NEUROLOGICAL SURGERY

## 2020-05-22 PROCEDURE — 160041 HCHG SURGERY MINUTES - EA ADDL 1 MIN LEVEL 4: Performed by: NEUROLOGICAL SURGERY

## 2020-05-22 PROCEDURE — 700111 HCHG RX REV CODE 636 W/ 250 OVERRIDE (IP): Performed by: ANESTHESIOLOGY

## 2020-05-22 PROCEDURE — 700102 HCHG RX REV CODE 250 W/ 637 OVERRIDE(OP): Performed by: PHYSICIAN ASSISTANT

## 2020-05-22 PROCEDURE — 81025 URINE PREGNANCY TEST: CPT

## 2020-05-22 PROCEDURE — 160048 HCHG OR STATISTICAL LEVEL 1-5: Performed by: NEUROLOGICAL SURGERY

## 2020-05-22 PROCEDURE — 110454 HCHG SHELL REV 250: Performed by: NEUROLOGICAL SURGERY

## 2020-05-22 DEVICE — IMPLANTABLE DEVICE: Type: IMPLANTABLE DEVICE | Status: FUNCTIONAL

## 2020-05-22 RX ORDER — HALOPERIDOL 5 MG/ML
1 INJECTION INTRAMUSCULAR
Status: DISCONTINUED | OUTPATIENT
Start: 2020-05-22 | End: 2020-05-22 | Stop reason: HOSPADM

## 2020-05-22 RX ORDER — ENEMA 19; 7 G/133ML; G/133ML
1 ENEMA RECTAL
Status: DISCONTINUED | OUTPATIENT
Start: 2020-05-22 | End: 2020-05-23 | Stop reason: HOSPADM

## 2020-05-22 RX ORDER — ONDANSETRON 2 MG/ML
4 INJECTION INTRAMUSCULAR; INTRAVENOUS
Status: DISCONTINUED | OUTPATIENT
Start: 2020-05-22 | End: 2020-05-22 | Stop reason: HOSPADM

## 2020-05-22 RX ORDER — ONDANSETRON 2 MG/ML
INJECTION INTRAMUSCULAR; INTRAVENOUS PRN
Status: DISCONTINUED | OUTPATIENT
Start: 2020-05-22 | End: 2020-05-22 | Stop reason: SURG

## 2020-05-22 RX ORDER — METHOCARBAMOL 750 MG/1
750 TABLET, FILM COATED ORAL 4 TIMES DAILY
Qty: 56 TAB | Refills: 0 | Status: SHIPPED | OUTPATIENT
Start: 2020-05-22 | End: 2020-05-23

## 2020-05-22 RX ORDER — OXYCODONE HYDROCHLORIDE 10 MG/1
10 TABLET ORAL
Status: DISCONTINUED | OUTPATIENT
Start: 2020-05-22 | End: 2020-05-23 | Stop reason: HOSPADM

## 2020-05-22 RX ORDER — DOCUSATE SODIUM 100 MG/1
100 CAPSULE, LIQUID FILLED ORAL 2 TIMES DAILY
Status: DISCONTINUED | OUTPATIENT
Start: 2020-05-22 | End: 2020-05-23 | Stop reason: HOSPADM

## 2020-05-22 RX ORDER — ACETAMINOPHEN 500 MG
1000 TABLET ORAL EVERY 6 HOURS
Status: DISCONTINUED | OUTPATIENT
Start: 2020-05-22 | End: 2020-05-23 | Stop reason: HOSPADM

## 2020-05-22 RX ORDER — LAMOTRIGINE 100 MG/1
400 TABLET ORAL EVERY EVENING
Status: DISCONTINUED | OUTPATIENT
Start: 2020-05-22 | End: 2020-05-23 | Stop reason: HOSPADM

## 2020-05-22 RX ORDER — CEFAZOLIN SODIUM 1 G/3ML
INJECTION, POWDER, FOR SOLUTION INTRAMUSCULAR; INTRAVENOUS PRN
Status: DISCONTINUED | OUTPATIENT
Start: 2020-05-22 | End: 2020-05-22 | Stop reason: SURG

## 2020-05-22 RX ORDER — LABETALOL HYDROCHLORIDE 5 MG/ML
10 INJECTION, SOLUTION INTRAVENOUS
Status: DISCONTINUED | OUTPATIENT
Start: 2020-05-22 | End: 2020-05-23 | Stop reason: HOSPADM

## 2020-05-22 RX ORDER — ONDANSETRON 4 MG/1
4 TABLET, ORALLY DISINTEGRATING ORAL EVERY 4 HOURS PRN
Status: DISCONTINUED | OUTPATIENT
Start: 2020-05-22 | End: 2020-05-23 | Stop reason: HOSPADM

## 2020-05-22 RX ORDER — DIPHENHYDRAMINE HCL 25 MG
25 TABLET ORAL EVERY 6 HOURS PRN
Status: DISCONTINUED | OUTPATIENT
Start: 2020-05-22 | End: 2020-05-23 | Stop reason: HOSPADM

## 2020-05-22 RX ORDER — DIPHENHYDRAMINE HYDROCHLORIDE 50 MG/ML
12.5 INJECTION INTRAMUSCULAR; INTRAVENOUS
Status: DISCONTINUED | OUTPATIENT
Start: 2020-05-22 | End: 2020-05-22 | Stop reason: HOSPADM

## 2020-05-22 RX ORDER — BACITRACIN 50000 [IU]/1
INJECTION, POWDER, FOR SOLUTION INTRAMUSCULAR
Status: DISCONTINUED | OUTPATIENT
Start: 2020-05-22 | End: 2020-05-22 | Stop reason: HOSPADM

## 2020-05-22 RX ORDER — BUPIVACAINE HYDROCHLORIDE AND EPINEPHRINE 5; 5 MG/ML; UG/ML
INJECTION, SOLUTION PERINEURAL
Status: DISCONTINUED | OUTPATIENT
Start: 2020-05-22 | End: 2020-05-22 | Stop reason: HOSPADM

## 2020-05-22 RX ORDER — METHOCARBAMOL 750 MG/1
750 TABLET, FILM COATED ORAL EVERY 8 HOURS PRN
Status: DISCONTINUED | OUTPATIENT
Start: 2020-05-22 | End: 2020-05-23 | Stop reason: HOSPADM

## 2020-05-22 RX ORDER — ACETAMINOPHEN 500 MG
1000 TABLET ORAL ONCE
Status: COMPLETED | OUTPATIENT
Start: 2020-05-22 | End: 2020-05-22

## 2020-05-22 RX ORDER — CEFAZOLIN SODIUM 2 G/100ML
2 INJECTION, SOLUTION INTRAVENOUS EVERY 8 HOURS
Status: COMPLETED | OUTPATIENT
Start: 2020-05-22 | End: 2020-05-23

## 2020-05-22 RX ORDER — HYDROMORPHONE HYDROCHLORIDE 1 MG/ML
0.5 INJECTION, SOLUTION INTRAMUSCULAR; INTRAVENOUS; SUBCUTANEOUS
Status: DISCONTINUED | OUTPATIENT
Start: 2020-05-22 | End: 2020-05-23 | Stop reason: HOSPADM

## 2020-05-22 RX ORDER — GABAPENTIN 300 MG/1
600 CAPSULE ORAL 3 TIMES DAILY
Status: DISCONTINUED | OUTPATIENT
Start: 2020-05-22 | End: 2020-05-23 | Stop reason: HOSPADM

## 2020-05-22 RX ORDER — ROCURONIUM BROMIDE 10 MG/ML
INJECTION, SOLUTION INTRAVENOUS PRN
Status: DISCONTINUED | OUTPATIENT
Start: 2020-05-22 | End: 2020-05-22 | Stop reason: SURG

## 2020-05-22 RX ORDER — PROCHLORPERAZINE EDISYLATE 5 MG/ML
5-10 INJECTION INTRAMUSCULAR; INTRAVENOUS EVERY 4 HOURS PRN
Status: DISCONTINUED | OUTPATIENT
Start: 2020-05-22 | End: 2020-05-23 | Stop reason: HOSPADM

## 2020-05-22 RX ORDER — PROMETHAZINE HYDROCHLORIDE 12.5 MG/1
12.5-25 SUPPOSITORY RECTAL EVERY 4 HOURS PRN
Status: DISCONTINUED | OUTPATIENT
Start: 2020-05-22 | End: 2020-05-23 | Stop reason: HOSPADM

## 2020-05-22 RX ORDER — PROMETHAZINE HYDROCHLORIDE 25 MG/1
12.5-25 TABLET ORAL EVERY 4 HOURS PRN
Status: DISCONTINUED | OUTPATIENT
Start: 2020-05-22 | End: 2020-05-23 | Stop reason: HOSPADM

## 2020-05-22 RX ORDER — HYDROMORPHONE HYDROCHLORIDE 1 MG/ML
0.2 INJECTION, SOLUTION INTRAMUSCULAR; INTRAVENOUS; SUBCUTANEOUS
Status: DISCONTINUED | OUTPATIENT
Start: 2020-05-22 | End: 2020-05-22 | Stop reason: HOSPADM

## 2020-05-22 RX ORDER — AMOXICILLIN 250 MG
1 CAPSULE ORAL
Status: DISCONTINUED | OUTPATIENT
Start: 2020-05-22 | End: 2020-05-23 | Stop reason: HOSPADM

## 2020-05-22 RX ORDER — DEXAMETHASONE SODIUM PHOSPHATE 4 MG/ML
INJECTION, SOLUTION INTRA-ARTICULAR; INTRALESIONAL; INTRAMUSCULAR; INTRAVENOUS; SOFT TISSUE PRN
Status: DISCONTINUED | OUTPATIENT
Start: 2020-05-22 | End: 2020-05-22 | Stop reason: SURG

## 2020-05-22 RX ORDER — OXYCODONE HYDROCHLORIDE AND ACETAMINOPHEN 5; 325 MG/1; MG/1
1 TABLET ORAL EVERY 4 HOURS PRN
Qty: 42 TAB | Refills: 0 | Status: SHIPPED | OUTPATIENT
Start: 2020-05-22 | End: 2020-05-23

## 2020-05-22 RX ORDER — LIDOCAINE HYDROCHLORIDE 40 MG/ML
SOLUTION TOPICAL PRN
Status: DISCONTINUED | OUTPATIENT
Start: 2020-05-22 | End: 2020-05-22 | Stop reason: SURG

## 2020-05-22 RX ORDER — MIDAZOLAM HYDROCHLORIDE 1 MG/ML
1 INJECTION INTRAMUSCULAR; INTRAVENOUS
Status: DISCONTINUED | OUTPATIENT
Start: 2020-05-22 | End: 2020-05-22 | Stop reason: HOSPADM

## 2020-05-22 RX ORDER — BISACODYL 10 MG
10 SUPPOSITORY, RECTAL RECTAL
Status: DISCONTINUED | OUTPATIENT
Start: 2020-05-22 | End: 2020-05-23 | Stop reason: HOSPADM

## 2020-05-22 RX ORDER — MIDAZOLAM HYDROCHLORIDE 1 MG/ML
INJECTION INTRAMUSCULAR; INTRAVENOUS PRN
Status: DISCONTINUED | OUTPATIENT
Start: 2020-05-22 | End: 2020-05-22 | Stop reason: SURG

## 2020-05-22 RX ORDER — LIDOCAINE HYDROCHLORIDE 20 MG/ML
INJECTION, SOLUTION EPIDURAL; INFILTRATION; INTRACAUDAL; PERINEURAL PRN
Status: DISCONTINUED | OUTPATIENT
Start: 2020-05-22 | End: 2020-05-22 | Stop reason: SURG

## 2020-05-22 RX ORDER — SODIUM CHLORIDE, SODIUM LACTATE, POTASSIUM CHLORIDE, CALCIUM CHLORIDE 600; 310; 30; 20 MG/100ML; MG/100ML; MG/100ML; MG/100ML
INJECTION, SOLUTION INTRAVENOUS CONTINUOUS
Status: DISCONTINUED | OUTPATIENT
Start: 2020-05-22 | End: 2020-05-22 | Stop reason: HOSPADM

## 2020-05-22 RX ORDER — LAMOTRIGINE 100 MG/1
400 TABLET ORAL ONCE
Status: COMPLETED | OUTPATIENT
Start: 2020-05-22 | End: 2020-05-22

## 2020-05-22 RX ORDER — DEXTROSE MONOHYDRATE, SODIUM CHLORIDE, AND POTASSIUM CHLORIDE 50; 1.49; 9 G/1000ML; G/1000ML; G/1000ML
INJECTION, SOLUTION INTRAVENOUS CONTINUOUS
Status: DISCONTINUED | OUTPATIENT
Start: 2020-05-22 | End: 2020-05-23 | Stop reason: HOSPADM

## 2020-05-22 RX ORDER — DIAZEPAM 5 MG/1
5 TABLET ORAL EVERY 4 HOURS PRN
Status: DISCONTINUED | OUTPATIENT
Start: 2020-05-22 | End: 2020-05-23 | Stop reason: HOSPADM

## 2020-05-22 RX ORDER — CYCLOBENZAPRINE HCL 10 MG
10 TABLET ORAL EVERY 8 HOURS PRN
Status: DISCONTINUED | OUTPATIENT
Start: 2020-05-22 | End: 2020-05-23 | Stop reason: HOSPADM

## 2020-05-22 RX ORDER — OXYCODONE HYDROCHLORIDE 5 MG/1
5 TABLET ORAL
Status: DISCONTINUED | OUTPATIENT
Start: 2020-05-22 | End: 2020-05-23 | Stop reason: HOSPADM

## 2020-05-22 RX ORDER — HYDROMORPHONE HYDROCHLORIDE 1 MG/ML
0.1 INJECTION, SOLUTION INTRAMUSCULAR; INTRAVENOUS; SUBCUTANEOUS
Status: DISCONTINUED | OUTPATIENT
Start: 2020-05-22 | End: 2020-05-22 | Stop reason: HOSPADM

## 2020-05-22 RX ORDER — HYDRALAZINE HYDROCHLORIDE 20 MG/ML
10 INJECTION INTRAMUSCULAR; INTRAVENOUS
Status: DISCONTINUED | OUTPATIENT
Start: 2020-05-22 | End: 2020-05-23 | Stop reason: HOSPADM

## 2020-05-22 RX ORDER — AMOXICILLIN 250 MG
1 CAPSULE ORAL NIGHTLY
Status: DISCONTINUED | OUTPATIENT
Start: 2020-05-22 | End: 2020-05-23 | Stop reason: HOSPADM

## 2020-05-22 RX ORDER — OXYCODONE HCL 5 MG/5 ML
10 SOLUTION, ORAL ORAL
Status: COMPLETED | OUTPATIENT
Start: 2020-05-22 | End: 2020-05-22

## 2020-05-22 RX ORDER — MEPERIDINE HYDROCHLORIDE 25 MG/ML
6.25 INJECTION INTRAMUSCULAR; INTRAVENOUS; SUBCUTANEOUS
Status: DISCONTINUED | OUTPATIENT
Start: 2020-05-22 | End: 2020-05-22 | Stop reason: HOSPADM

## 2020-05-22 RX ORDER — ACETAMINOPHEN 325 MG/1
650 TABLET ORAL
Status: ON HOLD | COMMUNITY
End: 2020-05-22

## 2020-05-22 RX ORDER — OXYCODONE HCL 5 MG/5 ML
5 SOLUTION, ORAL ORAL
Status: COMPLETED | OUTPATIENT
Start: 2020-05-22 | End: 2020-05-22

## 2020-05-22 RX ORDER — ONDANSETRON 2 MG/ML
4 INJECTION INTRAMUSCULAR; INTRAVENOUS EVERY 4 HOURS PRN
Status: DISCONTINUED | OUTPATIENT
Start: 2020-05-22 | End: 2020-05-23 | Stop reason: HOSPADM

## 2020-05-22 RX ORDER — SODIUM CHLORIDE, SODIUM LACTATE, POTASSIUM CHLORIDE, CALCIUM CHLORIDE 600; 310; 30; 20 MG/100ML; MG/100ML; MG/100ML; MG/100ML
INJECTION, SOLUTION INTRAVENOUS CONTINUOUS
Status: ACTIVE | OUTPATIENT
Start: 2020-05-22 | End: 2020-05-22

## 2020-05-22 RX ORDER — DIPHENHYDRAMINE HYDROCHLORIDE 50 MG/ML
25 INJECTION INTRAMUSCULAR; INTRAVENOUS EVERY 6 HOURS PRN
Status: DISCONTINUED | OUTPATIENT
Start: 2020-05-22 | End: 2020-05-23 | Stop reason: HOSPADM

## 2020-05-22 RX ORDER — GLYCOPYRROLATE 0.2 MG/ML
INJECTION INTRAMUSCULAR; INTRAVENOUS PRN
Status: DISCONTINUED | OUTPATIENT
Start: 2020-05-22 | End: 2020-05-22 | Stop reason: SURG

## 2020-05-22 RX ORDER — TRAZODONE HYDROCHLORIDE 100 MG/1
100 TABLET ORAL NIGHTLY
Status: DISCONTINUED | OUTPATIENT
Start: 2020-05-22 | End: 2020-05-23 | Stop reason: HOSPADM

## 2020-05-22 RX ORDER — CALCIUM CARBONATE 500 MG/1
500 TABLET, CHEWABLE ORAL 2 TIMES DAILY
Status: DISCONTINUED | OUTPATIENT
Start: 2020-05-22 | End: 2020-05-23 | Stop reason: HOSPADM

## 2020-05-22 RX ORDER — POLYETHYLENE GLYCOL 3350 17 G/17G
1 POWDER, FOR SOLUTION ORAL 2 TIMES DAILY PRN
Status: DISCONTINUED | OUTPATIENT
Start: 2020-05-22 | End: 2020-05-23 | Stop reason: HOSPADM

## 2020-05-22 RX ORDER — HYDROMORPHONE HYDROCHLORIDE 1 MG/ML
0.4 INJECTION, SOLUTION INTRAMUSCULAR; INTRAVENOUS; SUBCUTANEOUS
Status: DISCONTINUED | OUTPATIENT
Start: 2020-05-22 | End: 2020-05-22 | Stop reason: HOSPADM

## 2020-05-22 RX ORDER — GABAPENTIN 300 MG/1
300 CAPSULE ORAL ONCE
Status: DISCONTINUED | OUTPATIENT
Start: 2020-05-22 | End: 2020-05-22 | Stop reason: HOSPADM

## 2020-05-22 RX ORDER — ALPRAZOLAM 0.25 MG/1
0.25 TABLET ORAL 2 TIMES DAILY PRN
Status: DISCONTINUED | OUTPATIENT
Start: 2020-05-22 | End: 2020-05-23 | Stop reason: HOSPADM

## 2020-05-22 RX ADMIN — EPHEDRINE SULFATE 10 MG: 50 INJECTION, SOLUTION INTRAVENOUS at 08:04

## 2020-05-22 RX ADMIN — PROPOFOL 150 MG: 10 INJECTION, EMULSION INTRAVENOUS at 07:33

## 2020-05-22 RX ADMIN — DOCUSATE SODIUM 100 MG: 100 CAPSULE, LIQUID FILLED ORAL at 17:05

## 2020-05-22 RX ADMIN — ACETAMINOPHEN 1000 MG: 500 TABLET ORAL at 06:40

## 2020-05-22 RX ADMIN — ACETAMINOPHEN 1000 MG: 500 TABLET ORAL at 17:06

## 2020-05-22 RX ADMIN — DEXAMETHASONE SODIUM PHOSPHATE 8 MG: 4 INJECTION, SOLUTION INTRA-ARTICULAR; INTRALESIONAL; INTRAMUSCULAR; INTRAVENOUS; SOFT TISSUE at 07:50

## 2020-05-22 RX ADMIN — LIDOCAINE HYDROCHLORIDE 2 ML: 20 INJECTION, SOLUTION EPIDURAL; INFILTRATION; INTRACAUDAL at 07:32

## 2020-05-22 RX ADMIN — POTASSIUM CHLORIDE, DEXTROSE MONOHYDRATE AND SODIUM CHLORIDE: 150; 5; 900 INJECTION, SOLUTION INTRAVENOUS at 21:48

## 2020-05-22 RX ADMIN — DOCUSATE SODIUM 50 MG AND SENNOSIDES 8.6 MG 1 TABLET: 8.6; 5 TABLET, FILM COATED ORAL at 21:44

## 2020-05-22 RX ADMIN — CEFAZOLIN SODIUM 2 G: 2 INJECTION, SOLUTION INTRAVENOUS at 17:07

## 2020-05-22 RX ADMIN — SODIUM CHLORIDE, POTASSIUM CHLORIDE, SODIUM LACTATE AND CALCIUM CHLORIDE: 600; 310; 30; 20 INJECTION, SOLUTION INTRAVENOUS at 06:40

## 2020-05-22 RX ADMIN — LIDOCAINE HYDROCHLORIDE 60 ML: 40 SOLUTION TOPICAL at 07:35

## 2020-05-22 RX ADMIN — OXYCODONE HYDROCHLORIDE 10 MG: 10 TABLET ORAL at 13:02

## 2020-05-22 RX ADMIN — SODIUM CHLORIDE, POTASSIUM CHLORIDE, SODIUM LACTATE AND CALCIUM CHLORIDE 1000 ML: 600; 310; 30; 20 INJECTION, SOLUTION INTRAVENOUS at 09:56

## 2020-05-22 RX ADMIN — FENTANYL CITRATE 50 MCG: 50 INJECTION INTRAMUSCULAR; INTRAVENOUS at 08:54

## 2020-05-22 RX ADMIN — LAMOTRIGINE 400 MG: 100 TABLET ORAL at 21:48

## 2020-05-22 RX ADMIN — DIAZEPAM 5 MG: 5 TABLET ORAL at 09:57

## 2020-05-22 RX ADMIN — ONDANSETRON 4 MG: 2 INJECTION INTRAMUSCULAR; INTRAVENOUS at 08:54

## 2020-05-22 RX ADMIN — POTASSIUM CHLORIDE, DEXTROSE MONOHYDRATE AND SODIUM CHLORIDE: 150; 5; 900 INJECTION, SOLUTION INTRAVENOUS at 12:57

## 2020-05-22 RX ADMIN — SUGAMMADEX 150 MG: 100 INJECTION, SOLUTION INTRAVENOUS at 08:55

## 2020-05-22 RX ADMIN — ROCURONIUM BROMIDE 50 MG: 10 INJECTION, SOLUTION INTRAVENOUS at 07:33

## 2020-05-22 RX ADMIN — OXYCODONE HYDROCHLORIDE 10 MG: 5 SOLUTION ORAL at 09:57

## 2020-05-22 RX ADMIN — FENTANYL CITRATE 50 MCG: 50 INJECTION INTRAMUSCULAR; INTRAVENOUS at 08:50

## 2020-05-22 RX ADMIN — ANTACID TABLETS 500 MG: 500 TABLET, CHEWABLE ORAL at 17:06

## 2020-05-22 RX ADMIN — MIDAZOLAM HYDROCHLORIDE 2 MG: 1 INJECTION, SOLUTION INTRAMUSCULAR; INTRAVENOUS at 07:27

## 2020-05-22 RX ADMIN — OXYCODONE 5 MG: 5 TABLET ORAL at 17:05

## 2020-05-22 RX ADMIN — ACETAMINOPHEN 1000 MG: 500 TABLET ORAL at 13:00

## 2020-05-22 RX ADMIN — FENTANYL CITRATE 100 MCG: 50 INJECTION INTRAMUSCULAR; INTRAVENOUS at 07:30

## 2020-05-22 RX ADMIN — GABAPENTIN 600 MG: 300 CAPSULE ORAL at 13:09

## 2020-05-22 RX ADMIN — GLYCOPYRROLATE 0.2 MG: 0.2 INJECTION INTRAMUSCULAR; INTRAVENOUS at 08:09

## 2020-05-22 RX ADMIN — BENZOCAINE AND MENTHOL, UNSPECIFIED FORM 1 LOZENGE: 15; 3.6 LOZENGE ORAL at 22:05

## 2020-05-22 RX ADMIN — TRAZODONE HYDROCHLORIDE 100 MG: 100 TABLET ORAL at 21:44

## 2020-05-22 RX ADMIN — CEFAZOLIN 2 G: 330 INJECTION, POWDER, FOR SOLUTION INTRAMUSCULAR; INTRAVENOUS at 07:40

## 2020-05-22 RX ADMIN — GABAPENTIN 600 MG: 300 CAPSULE ORAL at 21:44

## 2020-05-22 RX ADMIN — OXYCODONE 5 MG: 5 TABLET ORAL at 21:44

## 2020-05-22 RX ADMIN — POVIDONE-IODINE 15 ML: 10 SOLUTION TOPICAL at 06:24

## 2020-05-22 ASSESSMENT — PATIENT HEALTH QUESTIONNAIRE - PHQ9
SUM OF ALL RESPONSES TO PHQ9 QUESTIONS 1 AND 2: 0
1. LITTLE INTEREST OR PLEASURE IN DOING THINGS: NOT AT ALL
2. FEELING DOWN, DEPRESSED, IRRITABLE, OR HOPELESS: NOT AT ALL

## 2020-05-22 ASSESSMENT — PAIN SCALES - GENERAL: PAIN_LEVEL: 5

## 2020-05-22 NOTE — ANESTHESIA POSTPROCEDURE EVALUATION
Patient: Reanna Ojeda    Procedure Summary     Date:  05/22/20 Room / Location:  [facility] 05 / SURGERY [facility]    Anesthesia Start:  0726 Anesthesia Stop:  0933    Procedure:  DISCECTOMY, SPINE, CERVICAL, ANTERIOR APPROACH, WITH FUSION-FOR ANTERIOR C5-6 ARTHROPLASY (Neck) Diagnosis:  (CERVICAL RADICULOPATHY)    Surgeon:  Jonel Ojeda M.D. Responsible Provider:  Huang Hansen M.D.    Anesthesia Type:  general ASA Status:  2          Final Anesthesia Type: general  Last vitals  BP   Blood Pressure: 117/58    Temp   36.6 °C (97.9 °F)    Pulse   Pulse: 85   Resp   13    SpO2   99 %      Anesthesia Post Evaluation    Patient location during evaluation: PACU  Patient participation: complete - patient participated  Level of consciousness: awake and alert  Pain score: 5    Airway patency: patent  Anesthetic complications: no  Cardiovascular status: hemodynamically stable  Respiratory status: acceptable  Hydration status: euvolemic    PONV: none           Nurse Pain Score: 5 (NPRS)

## 2020-05-22 NOTE — OR NURSING
0931: received to PACU via [facility]. Awake. Incision to right anterior neck with dermabond. SAHRA drain intact and compressed with serosanguinous drainage.  0945: Dr. Ojeda here talking to patient. Patient c/o tingling and numbness to both hands.  0957: c/o neck pain.  Pain scale 5/10. Valium 5 mg p.o. given per Dr. Ojeda's instruction. See MAR. Water given. Tolerated well. Soft cervical collar placed.  1105: report called [facility].  1151: transported via [facility] to [place] [address] by transport with O2 at 2 L / NC. Stable. Patient wearing face mask.

## 2020-05-22 NOTE — OP REPORT
DATE OF SERVICE:  05/22/2020    SURGEON:  Jonel Ojeda MD    FIRST ASSISTANT:  Salina Malave PA-C    PREOPERATIVE DIAGNOSES:  1.  C5-C6 disk herniation with bilateral foraminal compression.  2.  Bilateral C6 radiculopathies due to foraminal stenosis due to disk   herniation.    POSTOPERATIVE DIAGNOSES:  1.  C5-C6 disk herniation with bilateral foraminal compression.  2.  Bilateral C6 radiculopathies due to foraminal stenosis due to disk   herniation.    PROCEDURE:  C5-C6 disk arthroplasty using Prestige LP Medtronic cage, 5x14 mm   size.    COMPLICATIONS:  None.    FINDINGS:  The patient had an extremely small disk interspace.  It was   measured preoperatively 3 mm from endplate to endplate concerning for the size   of the implant.  We had significant discussions with the patient prior to   surgery whether or not we would be able to place a 5 mm height disk   arthroplasty within her disk space.  We told her that we would attempt to   place this.  With a trial of this, if the patient had continued   overdistraction of the facet, she would likely need to have a replacement with   an arthrodesis across the joint space, if she has overdistraction and   discomfort with the disk arthroplasty.  We were able to place a 5x14 mm disk   replacement.  The 14 mm size was appropriate for the length of the endplate;   however, the 5 mm implant required use of Redfield pins and distraction to get   it into place, which was extremely snug.    CONSENT:  Consent was obtained from the patient apprising the risks,   complications, indications of surgery, which included, but not limited to,   need for further surgery with exchange for disk arthrodesis.  She agreed and   consented.    PROCEDURE IN DETAIL:  The patient was brought in to the operating room.  She   was placed under general anesthesia with endotracheal intubation.  She was   then placed in a neutral position with her arms proposed and her head on a   donut.  She does  not have an interscapular roll placed due to recommendations   for implantation.  She was then clipped, prepped, and draped in sterile usual   fashion for an anterior cervical diskectomy and disk replacement.  We then   performed a surgical timeout confirming the correct side, site, procedure, and   patient, with all faculty and staff agreeing.  A lateral fluoro was brought   in to localize over her neck at approximately the C5-C6 level and to choose a   neck crease.  We chose the most inferior neck crease on the patient and began   with a #10 blade surgical scalpel to cut through the dermis.  We then used the   Bovie on 20/20 to cut down through the subcutaneous fat to the level of the   platysma and then did a supraplatysmal dissection, placed a self-retaining   Weitlaner retractor into the wound and then cut through the platysma using the   Bovie cautery and then did an infraplatysmal dissection.  There was a small   anterior jugular vein that was divided and then we dissected down the   avascular plane between the central visceral structures and the carotid   sheath, identifying the omohyoid in the process.  The omohyoid was then   isolated and bisected and tagged using 3-0 Vicryls.  We then dissected down   between the visceral structures again medially and the carotid sheath   laterally obtaining a corridor to the anterior alveolar tissue in the   prevertebral fascia.  Once this was identified, we then dissected superiorly   and inferiorly along the alveolar tissue using a Kitner.  Once the disk space   was identified, we then placed snap on the ALL and obtained a lateral   fluoroscopy.  Once we had localized over the C5-C6 disk interspace, we then   placed a bent spinal needle into the disk interspace and obtained a lateral   fluoro for confirmation shot.  We then placed the Cloward into the wound and   then dissected out over the C5-C6 disk interspace using a Bovie cautery,   dissecting the longus colli  laterally and the anterior endplates of the C5 and   C6 bodies.  Once these were exposed, we then placed the self-retaining   retraction system into the wound and began by incising the annulus with a #15   blade surgical scalpel and removing the anterior osteophytes using a 2.2 mm   high-speed drill.  At this point, the disk interspace was too far collapsed to   be able to gain access to the disk interspace and therefore, we placed Inman   pins in the C5 and C6 body and distracted slightly to allow us to gain access   to the disk interspace.  We then removed the disk in series using a series of   curettes.  We shaved the anterior disk osteophyte and complex down using the   3 mm high-speed drill along the inferior aspect of the C5 endplate and then   once we had visualization of the PLL, we then removed the PLL using micro   curette to gain access to the epidural space, and then a Kerrison 1 punch to   remove the residual disk, PLL, and to decompress the neural foramen.  Once we   were able to pass a nerve hook out freely behind the bodies of C5 and C6 as   well as in the C5-C6 foramen bilaterally completing bilateral neural   foraminotomies for decompression of the C6 nerve roots, we then irrigated the   wound out with bacitracin irrigation, FloSeal the endplates, and then began   with placement of our disk arthroplasty.  We used a 5 mm eugenio x 14 mm eugenio   placing it centrally using a gauge to find the midpoint of the body.  This had   to be done under distraction because the patient's disk interspace was so   tight.  Once we had the eugenio in place, we then removed the Inman pins and   then went through the series of components, first using the hand drill, then   followed by the rail cutter.  Once the rail cutter was completed, we then   removed the disk interspace, cleaned off the rail cuts, and then removed the   Inman pins and then malleted the disk arthroplasty into place under direct   fluoro visualization  to ensure that the AP dimensions of the implant were not   too long.  We felt that there was very good placement of the disk arthroplasty   with the ball joint located and the posterior half of the disk interspace   between C5 and C6.  We also obtained an AP fluoro shot to ensure that we had   central location of the disk replacement.  Once we felt that this was an   adequate placement, we then cleaned the wound with bacitracin irrigation,   placed a 7 mm round Lei in the wound, and then closed the wound in layers   after checking for meticulous hemostasis using 3-0 Vicryls on the omohyoid,   3-0 Vicryls on the platysma, 3-0 Vicryl in the dermis followed by a 4-0   Monocryl and Dermabond as a dressing.  All counts were correct x2.  I was   present for the entire case.    My physician assistant was necessary, as she assisted with retraction and   removal of the disk and placement of the disk arthroplasty.       ____________________________________     MD ANAMARIA Herrmann / ALDAIR    DD:  05/22/2020 10:38:26  DT:  05/22/2020 14:00:50    D#:  [id number]  Job#:  [id number]

## 2020-05-22 NOTE — ANESTHESIA TIME REPORT
Anesthesia Start and Stop Event Times     Date Time Event    5/22/2020 0701 Ready for Procedure     0726 Anesthesia Start     0933 Anesthesia Stop        Responsible Staff  05/22/20    Name Role Begin End    Huang Hansen M.D. Anesth 0726 0933        Preop Diagnosis (Free Text):  Pre-op Diagnosis     CERVICAL RADICULOPATHY        Preop Diagnosis (Codes):    Post op Diagnosis  Cervical radiculopathy      Premium Reason  Non-Premium    Comments:

## 2020-05-22 NOTE — ANESTHESIA PREPROCEDURE EVALUATION
Cervical radiculopathy    Relevant Problems   Other   (+) Bipolar 2 disorder (HCC)   fibromyalgia    Physical Exam    Airway   Mallampati: II  TM distance: >3 FB  Neck ROM: full       Cardiovascular - normal exam  Rhythm: regular  Rate: normal  (-) murmur     Dental - normal exam           Pulmonary - normal exam  Breath sounds clear to auscultation     Abdominal    Neurological - normal exam                 Anesthesia Plan    ASA 2       Plan - general       Airway plan will be ETT        Induction: intravenous    Postoperative Plan: Postoperative administration of opioids is intended.    Pertinent diagnostic labs and testing reviewed    Informed Consent:    Anesthetic plan and risks discussed with patient.    Use of blood products discussed with: patient whom consented to blood products.

## 2020-05-22 NOTE — DISCHARGE SUMMARY
Discharge Summary    CHIEF COMPLAINT ON ADMISSION  No chief complaint on file.      Reason for Admission  CERVICAL RADICULOPATHY     Admission Date  5/22/2020    CODE STATUS  No Order    HPI & HOSPITAL COURSE  This is a 44 y.o. female here with bilateral C6 radiculopathy. Surgery proceeded without complication. Pt recovered well post-operatively and was kept in house overnight for pain control, drain output.   She will be discharged when meeting criteria, cleared by therapy.        Therefore, she is discharged in good and stable condition to home with close outpatient follow-up.    The patient recovered much more quickly than anticipated on admission.    Discharge Date  5/23/2020 if meeting criteria    FOLLOW UP ITEMS POST DISCHARGE  Keep scheduled apt in office    DISCHARGE DIAGNOSES  Active Problems:    * No active hospital problems. *  Resolved Problems:    * No resolved hospital problems. *      FOLLOW UP  No future appointments.  No follow-up provider specified.    MEDICATIONS ON DISCHARGE     Medication List      START taking these medications      Instructions   methocarbamol 750 MG Tabs  Commonly known as:  ROBAXIN   Take 1 Tab by mouth 4 times a day for 14 days.  Dose:  750 mg     oxyCODONE-acetaminophen 5-325 MG Tabs  Commonly known as:  PERCOCET   Take 1 Tab by mouth every four hours as needed for Severe Pain for up to 7 days.  Dose:  1 Tab        CONTINUE taking these medications      Instructions   gabapentin 300 MG Caps  Commonly known as:  NEURONTIN   Take 600 mg by mouth 3 times a day.  Dose:  600 mg     lamotrigine 200 MG tablet  Commonly known as:  LAMICTAL   Take 400 mg by mouth every evening.  Dose:  400 mg     norgestrel-ethinyl estradiol 0.3-30 MG-MCG Tabs  Commonly known as:  LO-OVRAL   Take active pills continuously to suppress menses     traZODone 100 MG Tabs  Commonly known as:  DESYREL   Take 100 mg by mouth every evening.  Dose:  100 mg     Zoloft 25 MG tablet  Generic drug:  sertraline    Take 25 mg by mouth every day.  Dose:  25 mg        STOP taking these medications    acetaminophen 325 MG Tabs  Commonly known as:  TYLENOL          Pt will be discharged with   Robaxin 750mg 14d, #56  Percocet 5/325mg 1 tab q4hr, 7 d, #42   reviewed, moderate-high risk per ORT (h/o IVDU, post-op pain regimen discussed with pt), informed consent obtained    Allergies  No Known Allergies    DIET  No orders of the defined types were placed in this encounter.      ACTIVITY  Light duty.  Weight bearing as tolerated    CONSULTATIONS       PROCEDURES  C5-6 arthroplasty  Dr Ojeda, 5/22/2020    LABORATORY  Lab Results   Component Value Date    SODIUM 142 05/18/2020    POTASSIUM 4.5 05/18/2020    CHLORIDE 105 05/18/2020    CO2 25 05/18/2020    GLUCOSE 102 (H) 05/18/2020    BUN 15 05/18/2020    CREATININE 0.76 05/18/2020        Lab Results   Component Value Date    WBC 5.0 05/18/2020    HEMOGLOBIN 13.7 05/18/2020    HEMATOCRIT 43.7 05/18/2020    PLATELETCT 279 05/18/2020        Total time of the discharge process exceeds 30 minutes.

## 2020-05-22 NOTE — ANESTHESIA PROCEDURE NOTES
Airway    Date/Time: 5/22/2020 7:35 AM  Performed by: Huang Hansen M.D.  Authorized by: Huang Hansen M.D.     Location:  OR  Urgency:  Elective  Difficult Airway: No    Indications for Airway Management:  Anesthesia      Spontaneous Ventilation: absent    Sedation Level:  Deep  Preoxygenated: Yes    Patient Position:  Sniffing  Mask Difficulty Assessment:  2 - vent by mask + OA or adjuvant +/- NMBA  Final Airway Type:  Endotracheal airway  Final Endotracheal Airway:  ETT  Cuffed: Yes    Technique Used for Successful ETT Placement:  Direct laryngoscopy    Insertion Site:  Oral  Blade Type:  Beatriz  Laryngoscope Blade/Videolaryngoscope Blade Size:  3  ETT Size (mm):  6.5  Measured from:  Teeth  ETT to Teeth (cm):  21  Placement Verified by: auscultation and capnometry    Cormack-Lehane Classification:  Grade I - full view of glottis  Number of Attempts at Approach:  1   Atraumatic DLx1

## 2020-05-23 VITALS
HEART RATE: 67 BPM | OXYGEN SATURATION: 92 % | TEMPERATURE: 97.8 F | SYSTOLIC BLOOD PRESSURE: 119 MMHG | DIASTOLIC BLOOD PRESSURE: 70 MMHG | HEIGHT: 66 IN | WEIGHT: 131.61 LBS | RESPIRATION RATE: 16 BRPM | BODY MASS INDEX: 21.15 KG/M2

## 2020-05-23 PROCEDURE — 700102 HCHG RX REV CODE 250 W/ 637 OVERRIDE(OP): Performed by: PHYSICIAN ASSISTANT

## 2020-05-23 PROCEDURE — 97161 PT EVAL LOW COMPLEX 20 MIN: CPT

## 2020-05-23 PROCEDURE — 700112 HCHG RX REV CODE 229: Performed by: PHYSICIAN ASSISTANT

## 2020-05-23 PROCEDURE — 700111 HCHG RX REV CODE 636 W/ 250 OVERRIDE (IP): Performed by: PHYSICIAN ASSISTANT

## 2020-05-23 PROCEDURE — A9270 NON-COVERED ITEM OR SERVICE: HCPCS | Performed by: PHYSICIAN ASSISTANT

## 2020-05-23 RX ORDER — DIAZEPAM 5 MG/1
5 TABLET ORAL EVERY 6 HOURS PRN
Qty: 45 TAB | Refills: 0 | Status: SHIPPED | OUTPATIENT
Start: 2020-05-23 | End: 2020-06-06

## 2020-05-23 RX ORDER — OXYCODONE HYDROCHLORIDE 5 MG/1
5 TABLET ORAL EVERY 6 HOURS PRN
Qty: 60 TAB | Refills: 0 | Status: SHIPPED | OUTPATIENT
Start: 2020-05-23 | End: 2020-06-07

## 2020-05-23 RX ADMIN — METHOCARBAMOL TABLETS 750 MG: 750 TABLET, COATED ORAL at 00:30

## 2020-05-23 RX ADMIN — SERTRALINE HYDROCHLORIDE 25 MG: 50 TABLET ORAL at 05:16

## 2020-05-23 RX ADMIN — OXYCODONE 5 MG: 5 TABLET ORAL at 09:27

## 2020-05-23 RX ADMIN — GABAPENTIN 600 MG: 300 CAPSULE ORAL at 09:31

## 2020-05-23 RX ADMIN — ANTACID TABLETS 500 MG: 500 TABLET, CHEWABLE ORAL at 05:16

## 2020-05-23 RX ADMIN — CEFAZOLIN SODIUM 2 G: 2 INJECTION, SOLUTION INTRAVENOUS at 00:30

## 2020-05-23 RX ADMIN — OXYCODONE HYDROCHLORIDE 10 MG: 10 TABLET ORAL at 05:17

## 2020-05-23 RX ADMIN — NORGESTREL AND ETHINYL ESTRADIOL 1 TABLET: KIT at 05:16

## 2020-05-23 RX ADMIN — ACETAMINOPHEN 1000 MG: 500 TABLET ORAL at 00:30

## 2020-05-23 RX ADMIN — DOCUSATE SODIUM 100 MG: 100 CAPSULE, LIQUID FILLED ORAL at 05:17

## 2020-05-23 ASSESSMENT — COGNITIVE AND FUNCTIONAL STATUS - GENERAL
SUGGESTED CMS G CODE MODIFIER MOBILITY: CH
MOBILITY SCORE: 24

## 2020-05-23 ASSESSMENT — GAIT ASSESSMENTS
DISTANCE (FEET): 200
DEVIATION: NO DEVIATION
GAIT LEVEL OF ASSIST: SUPERVISED

## 2020-05-23 NOTE — CARE PLAN
Problem: Safety  Goal: Will remain free from injury  Outcome: PROGRESSING AS EXPECTED   Bed in lowest position, call within reach, rounding in place, no risk for falls, educated on calling for assistance if feeling dizzy   Problem: Infection  Goal: Will remain free from infection  Outcome: PROGRESSING AS EXPECTED   Standard precautions in place, patient educated on proper hand washing, nutrition promoted, IS used

## 2020-05-23 NOTE — DISCHARGE INSTRUCTIONS
Leave incision open to air. Dermabond glue will flake off over the next 2-3 weeks.   OK to shower & pat dry 24hr after drain has been removed.   No soaking in hot tubs, baths, pools, etc.  Wear your soft cervical collar as needed for comfort max 5hr/day or while sleeping. We encourage gentle neck movement and light stretching on a daily basis  Avoid repetitive bending, lifting over 10lbs, twisting. We encourage you to take frequent walks as tolerated  Do not drive or consume alcohol while taking narcotic pain medications. Do not take additional acetaminophen (tylenol) without consulting our office.   Do not take NSAIDs (such as ibuprofen or naproxen) or Aspirin unless you have been told otherwise by Dr Ojeda's team  Follow up at [facility] Neurosurgery office in 2 weeks. Call with questions or concerns @ [phone]    Discharge Instructions    Discharged to home by car with relative. Discharged via wheelchair, hospital escort: Yes.  Special equipment needed: Not Applicable    Be sure to schedule a follow-up appointment with your primary care doctor or any specialists as instructed.     Discharge Plan:   Diet Plan: Discussed  Activity Level: Discussed  Confirmed Follow up Appointment: Patient to Call and Schedule Appointment  Confirmed Symptoms Management: Discussed  Medication Reconciliation Updated: Yes    I understand that a diet low in cholesterol, fat, and sodium is recommended for good health. Unless I have been given specific instructions below for another diet, I accept this instruction as my diet prescription.   Other diet: reglar    Special Instructions: None    · Is patient discharged on Warfarin / Coumadin?   No     Depression / Suicide Risk    As you are discharged from this [facility] facility, it is important to learn how to keep safe from harming yourself.    Recognize the warning signs:  · Abrupt changes in personality, positive or negative- including increase in energy   · Giving away  possessions  · Change in eating patterns- significant weight changes-  positive or negative  · Change in sleeping patterns- unable to sleep or sleeping all the time   · Unwillingness or inability to communicate  · Depression  · Unusual sadness, discouragement and loneliness  · Talk of wanting to die  · Neglect of personal appearance   · Rebelliousness- reckless behavior  · Withdrawal from people/activities they love  · Confusion- inability to concentrate     If you or a loved one observes any of these behaviors or has concerns about self-harm, here's what you can do:  · Talk about it- your feelings and reasons for harming yourself  · Remove any means that you might use to hurt yourself (examples: pills, rope, extension cords, firearm)  · Get professional help from the community (Mental Health, Substance Abuse, psychological counseling)  · Do not be alone:Call your Safe Contact- someone whom you trust who will be there for you.  · Call your local CRISIS HOTLINE 132-0657 or 778-601-1177  · Call your local Children's Mobile Crisis Response Team [place] (441) 069-7620 or [employer]  · Call the toll free National Suicide Prevention Hotlines   · National Suicide Prevention Lifeline 223-010-PQKZ (6866)  · National Hope Line Network 800-SUICIDE (041-9095)    Anterior Cervical Diskectomy and Fusion  Introduction  Anterior cervical diskectomy and fusion is a surgery to remove and replace an intervertebral disk. Intervertebral disks are plates of cartilage located between the bones of the spine. This surgery is done when an intervertebral disk in the neck puts pressure on the spine or on a nerve.  The surgery is done through the front (anterior) part of the neck. During the surgery, the damaged disk is removed and replaced with a plastic implant, a bone from another part of the body (bone graft), or both. Sometimes metal plates and screws (hardware) are also put in the neck to help keep the implant or bone  graft in place and to allow the bones to grow together (fuse).  Tell a health care provider about:  · Any allergies you have.  · All medicines you are taking, including vitamins, herbs, eye drops, creams, and over-the-counter medicines.  · Any problems you or family members have had with anesthetic medicines.  · Any blood disorders you have.  · Any surgeries you have had.  · Any medical conditions you have.  · Whether you are pregnant or may be pregnant.  What are the risks?  Generally, this is a safe procedure. However, problems may occur, including:  · Infection.  · Bleeding with the possible need for blood transfusion.  · Injury to surrounding structures, including nerves.  · Leakage of fluid from the brain or spinal cord (cerebrospinal fluid).  · Blood clots.  · Temporary breathing difficulties.  What happens before the procedure?  · Follow instructions from your health care provider about eating or drinking restrictions.  · Ask your health care provider about:  ¨ Changing or stopping your regular medicines. This is especially important if you are taking diabetes medicines or blood thinners.  ¨ Taking medicines such as aspirin and ibuprofen. These medicines can thin your blood. Do not take these medicines before your procedure if your health care provider instructs you not to.  · You may be given antibiotic medicines to help prevent infection.  What happens during the procedure?  · An IV tube will be inserted into one of your veins.  · You will be given one or more of the following:  ¨ A medicine that helps you relax (sedative).  ¨ A medicine that makes you fall asleep (general anesthetic).  · A breathing tube will be placed.  · Your neck will be cleaned with a germ-killing solution (antiseptic solution).  · Your surgeon will make a cut (incision) in the front of your neck.  · Your neck muscles will be spread apart.  · The damaged disk and any damaged bone will be removed.  · The area where the disk was removed  will be filled with a small plastic implant, a bone graft, or both.  · Hardware may be put in your neck.  · The incision will be closed with stitches (sutures).  · Adhesive strips or skin glue may be placed across the incision.  · A bandage (dressing) will be applied over the incision.  The procedure may vary among health care providers and hospitals.  What happens after the procedure?  · Your blood pressure, heart rate, breathing rate, and blood oxygen level will be monitored often until the medicines you were given have worn off.  · You will be monitored for any signs of complications from the procedure, such as:  ¨ Too much bleeding from the incision site.  ¨ A buildup of blood under your skin at the surgical site.  ¨ Difficulty breathing.  · You may continue to receive antibiotics.  · You can start to eat as soon as you feel comfortable.  · You may be given a neck brace to wear. This brace limits your neck movement while your bones are fusing.  This information is not intended to replace advice given to you by your health care provider. Make sure you discuss any questions you have with your health care provider.  Document Released: 12/06/2010 Document Revised: 05/25/2017 Document Reviewed: 12/01/2016  © 2017 [employer]

## 2020-05-23 NOTE — PROGRESS NOTES
Pt arrived in room [place] [address] via [facility]. Ambulated to the bed with 1 person hand held assist. Tolerated well. Soft collar in place. Surgical incision site open to air. No drainage noted. SAHRA drain in place. Pt reported no further needs. Dysphagia screening complete. Reported no further needs. Bed locked and at the lowest position. Call button and and personal belongings placed within reach. No family member at bedside.

## 2020-05-23 NOTE — PROGRESS NOTES
Neurosurgery Progress Note    Subjective:  No issues overnight     Exam:  Exam intact   C6 radic resolved post op  Minimal neck pain from muscle spasm     Drain removed   Incision CDI   Neck midline   Voice normal without issues   Able to swallow without difficulty     BP  Min: 102/66  Max: 127/63  Pulse  Av.5  Min: 67  Max: 102  Resp  Av  Min: 12  Max: 20  Temp  Av.6 °C (97.9 °F)  Min: 36.2 °C (97.2 °F)  Max: 37.2 °C (99 °F)  SpO2  Av.2 %  Min: 92 %  Max: 100 %    No data recorded                      Intake/Output       20 07 - 20 0659 20 - 2059       Total  Total       Intake    P.O.  220  -- 220  --  -- --    P.O. 220 -- 220 -- -- --    I.V.  1000  1500 2500  --  -- --    Volume (mL) (lactated ringers infusion) 800 -- 800 -- -- --    Volume (mL) (lactated ringers infusion) 200 -- 200 -- -- --    Volume (mL) (dextrose 5 % and 0.9 % NaCl with KCl 20 mEq infusion) -- 1500 1500 -- -- --    IV Piggyback  --  200 200  --  -- --    Volume (mL) (ceFAZolin in dextrose (ANCEF) IVPB premix 2 g) -- 200 200 -- -- --    Total Intake 1220 1700 2920 -- -- --       Output    Drains  10  20 30  --  -- --    Output (mL) ([REMOVED] Closed/Suction Drain 1 Anterior Neck Jus Lim) 10 20 30 -- -- --    Stool  --  -- --  --  -- --    Number of Times Stooled 1 x -- 1 x -- -- --    Blood  25  -- 25  --  -- --    Est. Blood Loss 25 -- 25 -- -- --    Total Output 35 20 55 -- -- --       Net I/O     1185 1680 2865 -- -- --            Intake/Output Summary (Last 24 hours) at 2020 0854  Last data filed at 2020 0500  Gross per 24 hour   Intake 2920 ml   Output 55 ml   Net 2865 ml            • gabapentin  600 mg TID   • lamotrigine  400 mg Q EVENING   • norgestrel-ethinyl estradiol  1 Tab Q EVENING   • sertraline  25 mg DAILY   • traZODone  100 mg Nightly   • Pharmacy Consult Request  1 Each PHARMACY TO DOSE   • MD ALERT...DO NOT ADMINISTER  NSAIDS or ASPIRIN unless ORDERED By Neurosurgery  1 Each PRN   • docusate sodium  100 mg BID   • senna-docusate  1 Tab Nightly   • senna-docusate  1 Tab Q24HRS PRN   • polyethylene glycol/lytes  1 Packet BID PRN   • magnesium hydroxide  30 mL QDAY PRN   • bisacodyl  10 mg Q24HRS PRN   • fleet  1 Each Once PRN   • dextrose 5 % and 0.9 % NaCl with KCl 20 mEq   Continuous   • acetaminophen  1,000 mg Q6HRS   • oxyCODONE immediate-release  5 mg Q3HRS PRN   • oxyCODONE immediate release  10 mg Q3HRS PRN   • HYDROmorphone  0.5 mg Q3HRS PRN   • diphenhydrAMINE  25 mg Q6HRS PRN    Or   • diphenhydrAMINE  25 mg Q6HRS PRN   • ondansetron  4 mg Q4HRS PRN   • ondansetron  4 mg Q4HRS PRN   • promethazine  12.5-25 mg Q4HRS PRN   • promethazine  12.5-25 mg Q4HRS PRN   • prochlorperazine  5-10 mg Q4HRS PRN   • methocarbamol  750 mg Q8HRS PRN    Or   • cyclobenzaprine  10 mg Q8HRS PRN    Or   • diazePAM  5 mg Q4HRS PRN   • ALPRAZolam  0.25 mg BID PRN   • labetalol  10 mg Q HOUR PRN   • hydrALAZINE  10 mg Q HOUR PRN   • benzocaine-menthol  1 Lozenge Q2HRS PRN   • calcium carbonate  500 mg BID       Assessment and Plan:  Hospital day #2  POD #1  Prophylactic anticoagulation: yes         Start date/time: 5/23/2020    meds in  chart script for valium and oxy   Med rec complete  Ok to dc order in

## 2020-05-23 NOTE — PROGRESS NOTES
Pt has discharged. Educated on medications, activity, meds, worsening symptoms. IV out. Denies pain.

## 2020-05-24 NOTE — THERAPY
Physical Therapy   Initial Evaluation     Patient Name: Reanna Ojeda  Age:  44 y.o., Sex:  female  Medical Record #: [id number]  Today's Date: 5/23/2020     Precautions: Cervical Collar      Assessment  After initial evaluation and pt education, pt has no further acute PT needs. She has dc'd to home at time of writing this note.     Plan    Recommend Physical Therapy for Evaluation only    Discharge recommendations:  Recommend outpatient physical therapy services to address higher level deficits dependent on ROM Limitations post acute/subacute healing. Pt aware of PT recs         05/23/20 0937   Prior Living Situation   Prior Services None   Housing / Facility 1 Story House   Steps Into Home 1   Steps In Home 0   Equipment Owned None   Lives with - Patient's Self Care Capacity Spouse   Prior Level of Functional Mobility   Bed Mobility Independent   Transfer Status Independent   Ambulation Independent   Distance Ambulation (Feet)   (community)   Assistive Devices Used None   Stairs Independent   Comments I with IADLs and ADls prior   Balance Assessment   Sitting Balance (Static) Normal   Sitting Balance (Dynamic) Normal   Standing Balance (Static) Normal   Standing Balance (Dynamic) Normal   Weight Shift Sitting Normal   Weight Shift Standing Normal   Comments no AD   Gait Analysis   Gait Level Of Assist Supervised   Assistive Device None   Distance (Feet) 200   # of Times Distance was Traveled 1   Deviation No deviation   # of Stairs Climbed 0   Weight Bearing Status no restrictions   Comments see balance   Bed Mobility    Supine to Sit Supervised   Sit to Supine Supervised   Scooting Supervised   Rolling Supervised   Functional Mobility   Sit to Stand Supervised   Bed, Chair, Wheelchair Transfer Supervised   Transfer Method Stand Step   Mobility with no AD   Education Group   Education Provided Role of Physical Therapist   Role of Physical Therapist Patient Response Patient;Acceptance;Explanation;Verbal  Demonstration   Additional Comments educatoin to clarify ROM with MD given disk replacement as varies dependent on MD and type of disk replcaement (as some encourage ROM during initial recovery); per pt reports gentle ROM OK per pt; lifting restrictions to ~20# per pt/MD   Anticipated Discharge Equipment   DC Equipment None

## 2020-09-01 ENCOUNTER — HOSPITAL ENCOUNTER (OUTPATIENT)
Dept: RADIOLOGY | Facility: MEDICAL CENTER | Age: 45
End: 2020-09-01
Attending: FAMILY MEDICINE
Payer: COMMERCIAL

## 2020-09-01 DIAGNOSIS — Z12.31 VISIT FOR SCREENING MAMMOGRAM: ICD-10-CM

## 2020-09-01 PROCEDURE — 77067 SCR MAMMO BI INCL CAD: CPT

## 2020-12-09 ENCOUNTER — OFFICE VISIT (OUTPATIENT)
Dept: URGENT CARE | Facility: CLINIC | Age: 45
End: 2020-12-09
Payer: COMMERCIAL

## 2020-12-09 ENCOUNTER — APPOINTMENT (OUTPATIENT)
Dept: RADIOLOGY | Facility: IMAGING CENTER | Age: 45
End: 2020-12-09
Attending: PHYSICIAN ASSISTANT
Payer: COMMERCIAL

## 2020-12-09 VITALS
SYSTOLIC BLOOD PRESSURE: 110 MMHG | HEIGHT: 67 IN | TEMPERATURE: 98.1 F | OXYGEN SATURATION: 98 % | RESPIRATION RATE: 17 BRPM | WEIGHT: 126 LBS | DIASTOLIC BLOOD PRESSURE: 82 MMHG | BODY MASS INDEX: 19.78 KG/M2 | HEART RATE: 78 BPM

## 2020-12-09 DIAGNOSIS — U07.1 COVID-19: ICD-10-CM

## 2020-12-09 DIAGNOSIS — R05.9 COUGH: ICD-10-CM

## 2020-12-09 PROCEDURE — 99214 OFFICE O/P EST MOD 30 MIN: CPT | Performed by: PHYSICIAN ASSISTANT

## 2020-12-09 PROCEDURE — 71046 X-RAY EXAM CHEST 2 VIEWS: CPT | Mod: TC | Performed by: PHYSICIAN ASSISTANT

## 2020-12-09 RX ORDER — ALBUTEROL SULFATE 90 UG/1
2 AEROSOL, METERED RESPIRATORY (INHALATION) EVERY 6 HOURS PRN
Qty: 8.5 G | Refills: 0 | Status: SHIPPED | OUTPATIENT
Start: 2020-12-09 | End: 2021-02-02

## 2020-12-09 ASSESSMENT — ENCOUNTER SYMPTOMS
SORE THROAT: 0
SPUTUM PRODUCTION: 0
VOMITING: 0
DIARRHEA: 0
SHORTNESS OF BREATH: 1
COUGH: 1
EYE REDNESS: 0
EYE DISCHARGE: 0
MYALGIAS: 0
WHEEZING: 0
FEVER: 0
NAUSEA: 0
HEADACHES: 0

## 2020-12-10 NOTE — PROGRESS NOTES
Subjective:      Reanna Ojeda is a 45 y.o. female who presents with Shortness of Breath (dx with covid 12/2/20 per health dept., she is not contagious anymore after being quarentine for 10days, she is coming because of sob, lightheaded)        Shortness of Breath  This is a new problem. Episode onset: x 10 days ago. The problem occurs intermittently. The problem has been unchanged. Pertinent negatives include no chest pain, ear pain, fever, headaches, leg swelling, rash, sore throat, sputum production, vomiting or wheezing. Exacerbated by: The patient reports increasing shortness of breath with exertion and prolonged talking. She has tried nothing for the symptoms.     The patient presents to clinic complaining of continued COVID-19 symptoms x10 days.  The patient states she developed symptoms consistent with COVID-19 on 11/24/2020.  The patient states she was subsequently tested for COVID-19 on 11/28/2020.  The patient received her positive test results on 12/2/2020.  The patient states she is experiencing a continued dry cough with intermittent shortness of breath.  The patient reports increased shortness of breath with exertion and prolonged talking.  The patient reports no associated fever.  No ear pain.  No sore throat.  No chest pain.  No wheezing.  The patient states she has not been taking any medications for her current symptoms.      PMH:  has a past medical history of Autoimmune disease (HCC), Bipolar 2 disorder (HCC), and Cervical pain (neck).  MEDS:   Current Outpatient Medications:   •  sertraline (ZOLOFT) 25 MG tablet, Take 25 mg by mouth every day., Disp: , Rfl:   •  gabapentin (NEURONTIN) 300 MG Cap, Take 600 mg by mouth 3 times a day., Disp: , Rfl:   •  norgestrel-ethinyl estradiol (LO-OVRAL) 0.3-30 MG-MCG Tab, Take active pills continuously to suppress menses, Disp: 84 Tab, Rfl: 3  •  lamotrigine (LAMICTAL) 200 MG tablet, Take 400 mg by mouth every evening., Disp: , Rfl:   •  traZODone  "(DESYREL) 100 MG Tab, Take 100 mg by mouth every evening., Disp: , Rfl:   ALLERGIES: No Known Allergies  SURGHX:   Past Surgical History:   Procedure Laterality Date   • CERVICAL DISK AND FUSION ANTERIOR  5/22/2020    Procedure: DISCECTOMY, SPINE, CERVICAL, ANTERIOR APPROACH, WITH FUSION-FOR ANTERIOR C5-6 ARTHROPLASY;  Surgeon: Jonel Ojeda M.D.;  Location: SURGERY [facility];  Service: Neurosurgery   • LUMPECTOMY Left 09/18/2019   • BREAST BIOPSY Left 9/28/2018    Procedure: BREAST BIOPSY- FOR EXCISION OF BREAST MASS;  Surgeon: Shraddha Moss M.D.;  Location: SURGERY SAME DAY [facility];  Service: General     SOCHX:  reports that she quit smoking about 19 months ago. Her smoking use included cigarettes. She has a 16.50 pack-year smoking history. She has never used smokeless tobacco. She reports that she does not drink alcohol or use drugs.  FH: Family history was reviewed, no pertinent findings to report    Review of Systems   Constitutional: Negative for fever.   HENT: Negative for congestion, ear pain and sore throat.    Eyes: Negative for discharge and redness.   Respiratory: Positive for cough (The patient reports an associated dry cough.) and shortness of breath (The patient reports intermittent shortness of breath worse with exertion and prolonged talking.). Negative for sputum production and wheezing.    Cardiovascular: Negative for chest pain and leg swelling.   Gastrointestinal: Negative for diarrhea, nausea and vomiting.   Musculoskeletal: Negative for myalgias.   Skin: Negative for rash.   Neurological: Negative for headaches.   All other systems reviewed and are negative.         Objective:     /82 (BP Location: Left arm, Patient Position: Sitting, BP Cuff Size: Adult)   Pulse 78   Temp 36.7 °C (98.1 °F) (Temporal)   Resp 17   Ht 1.689 m (5' 6.5\")   Wt 57.2 kg (126 lb)   SpO2 98%   BMI 20.03 kg/m²      Physical Exam  Constitutional:       General: She is not in acute " distress.     Appearance: Normal appearance. She is not ill-appearing.   HENT:      Head: Normocephalic and atraumatic.      Right Ear: External ear normal.      Left Ear: External ear normal.      Nose: Nose normal.      Mouth/Throat:      Mouth: Mucous membranes are moist.      Pharynx: Oropharynx is clear. No posterior oropharyngeal erythema.   Eyes:      Extraocular Movements: Extraocular movements intact.      Conjunctiva/sclera: Conjunctivae normal.   Neck:      Musculoskeletal: Normal range of motion and neck supple.   Cardiovascular:      Rate and Rhythm: Normal rate and regular rhythm.      Heart sounds: Normal heart sounds.   Pulmonary:      Effort: Pulmonary effort is normal. No respiratory distress.      Breath sounds: Normal breath sounds. No wheezing.   Musculoskeletal: Normal range of motion.   Skin:     General: Skin is warm and dry.   Neurological:      Mental Status: She is alert and oriented to person, place, and time.            Progress:  CXR:  COMPARISON:  5/18/2020     FINDINGS:  The heart is normal in size.  No pulmonary infiltrates or consolidations are noted.  No pleural effusions are appreciated.     IMPRESSION:  No active disease.    Recheck:  POX: 99% on room air  HR: 77     Assessment/Plan:        1. Cough  - DX-CHEST-2 VIEWS; Future  - albuterol 108 (90 Base) MCG/ACT Aero Soln inhalation aerosol; Inhale 2 Puffs every 6 hours as needed for Shortness of Breath.  Dispense: 8.5 g; Refill: 0    2. COVID-19    The patient's presenting symptoms and physical exam findings are consistent with a cough secondary to COVID-19.  The patient recently tested positive for COVID-19 on 12/2/2020.  The patient states she is experiencing a continued cough with intermittent shortness of breath.  The patient's physical exam today in clinic was normal.  The patient's lungs were clear to auscultation without wheezing, and her pulse ox was within normal limits.  The patient appears in no acute distress.  The  patient's vital signs are stable and within normal limits.  She is afebrile today in clinic.  A chest x-ray is obtained to further evaluate the patient's current symptoms.  The patient's chest x-ray showed no active disease.  Informed the patient that the symptoms of COVID-19 can last be on the quarantine period.  Will prescribe the patient albuterol inhaler for symptomatic relief of her current symptoms.  Recommend OTC medications and supportive care for symptomatic management.  Recommend follow-up with her PCP.  Discussed return precautions with the patient, he verbalized understanding.    Differential diagnoses, supportive care, and indications for immediate follow-up discussed with patient.   Instructed to return to clinic or nearest emergency department for any change in condition, further concerns, or worsening of symptoms.      OTC Tylenol or Motrin for fever/discomfort.  OTC cough/cold medication for symptomatic relief  OTC Supportive Care for Congestion - saline nasal spray or neti pot  Drink plenty of fluids  Advised the patient to stay at home under self-isolation until symptoms have been present for at least 10 days and are improved, and there has been no fever for at least 72 hours without the use of medications and/or no vomiting or diarrhea for 48 hours.  --Provided the patient with home isolation and self quarantine instructions  Work note provided  Follow-up with PCP  Return to clinic or go to the ED if symptoms worsen or fail to improve, or if the patient should develop worsening/increasing cough, congestion, ear pain, sore throat, shortness of breath, wheezing, chest pain, fever/chills, and/or any concerning symptoms.    Discussed plan with the patient, and she agrees to the above.     Please note that this dictation was created using voice recognition software. I have made every reasonable attempt to correct obvious errors, but I expect that there may be errors of grammar and possibly content  that I did not discover before finalizing the note.

## 2021-02-02 ENCOUNTER — OFFICE VISIT (OUTPATIENT)
Dept: URGENT CARE | Facility: CLINIC | Age: 46
End: 2021-02-02
Payer: COMMERCIAL

## 2021-02-02 ENCOUNTER — HOSPITAL ENCOUNTER (EMERGENCY)
Facility: MEDICAL CENTER | Age: 46
End: 2021-02-03
Attending: EMERGENCY MEDICINE
Payer: COMMERCIAL

## 2021-02-02 ENCOUNTER — APPOINTMENT (OUTPATIENT)
Dept: RADIOLOGY | Facility: MEDICAL CENTER | Age: 46
End: 2021-02-02
Attending: EMERGENCY MEDICINE
Payer: COMMERCIAL

## 2021-02-02 ENCOUNTER — NURSE TRIAGE (OUTPATIENT)
Dept: HEALTH INFORMATION MANAGEMENT | Facility: OTHER | Age: 46
End: 2021-02-02

## 2021-02-02 VITALS
HEART RATE: 75 BPM | RESPIRATION RATE: 14 BRPM | OXYGEN SATURATION: 95 % | HEIGHT: 66 IN | WEIGHT: 129 LBS | SYSTOLIC BLOOD PRESSURE: 112 MMHG | BODY MASS INDEX: 20.73 KG/M2 | TEMPERATURE: 97.1 F | DIASTOLIC BLOOD PRESSURE: 80 MMHG

## 2021-02-02 DIAGNOSIS — R10.11 RIGHT UPPER QUADRANT ABDOMINAL PAIN: ICD-10-CM

## 2021-02-02 DIAGNOSIS — R10.11 RUQ PAIN: ICD-10-CM

## 2021-02-02 LAB
ALBUMIN SERPL BCP-MCNC: 4.3 G/DL (ref 3.2–4.9)
ALBUMIN/GLOB SERPL: 1.5 G/DL
ALP SERPL-CCNC: 56 U/L (ref 30–99)
ALT SERPL-CCNC: 13 U/L (ref 2–50)
ANION GAP SERPL CALC-SCNC: 11 MMOL/L (ref 7–16)
APPEARANCE UR: CLEAR
APPEARANCE UR: CLEAR
AST SERPL-CCNC: 18 U/L (ref 12–45)
BASOPHILS # BLD AUTO: 0.4 % (ref 0–1.8)
BASOPHILS # BLD: 0.06 K/UL (ref 0–0.12)
BILIRUB SERPL-MCNC: 0.3 MG/DL (ref 0.1–1.5)
BILIRUB UR QL STRIP.AUTO: NEGATIVE
BILIRUB UR STRIP-MCNC: NEGATIVE MG/DL
BUN SERPL-MCNC: 12 MG/DL (ref 8–22)
CALCIUM SERPL-MCNC: 9.6 MG/DL (ref 8.5–10.5)
CHLORIDE SERPL-SCNC: 104 MMOL/L (ref 96–112)
CO2 SERPL-SCNC: 23 MMOL/L (ref 20–33)
COLOR UR AUTO: NORMAL
COLOR UR: YELLOW
CREAT SERPL-MCNC: 0.61 MG/DL (ref 0.5–1.4)
EOSINOPHIL # BLD AUTO: 0.09 K/UL (ref 0–0.51)
EOSINOPHIL NFR BLD: 0.7 % (ref 0–6.9)
ERYTHROCYTE [DISTWIDTH] IN BLOOD BY AUTOMATED COUNT: 45.1 FL (ref 35.9–50)
GLOBULIN SER CALC-MCNC: 2.9 G/DL (ref 1.9–3.5)
GLUCOSE SERPL-MCNC: 99 MG/DL (ref 65–99)
GLUCOSE UR STRIP.AUTO-MCNC: NEGATIVE MG/DL
GLUCOSE UR STRIP.AUTO-MCNC: NEGATIVE MG/DL
HCT VFR BLD AUTO: 41.9 % (ref 37–47)
HGB BLD-MCNC: 13.7 G/DL (ref 12–16)
IMM GRANULOCYTES # BLD AUTO: 0.08 K/UL (ref 0–0.11)
IMM GRANULOCYTES NFR BLD AUTO: 0.6 % (ref 0–0.9)
INT CON NEG: NORMAL
INT CON POS: NORMAL
KETONES UR STRIP.AUTO-MCNC: NEGATIVE MG/DL
KETONES UR STRIP.AUTO-MCNC: NEGATIVE MG/DL
LEUKOCYTE ESTERASE UR QL STRIP.AUTO: NEGATIVE
LEUKOCYTE ESTERASE UR QL STRIP.AUTO: NEGATIVE
LIPASE SERPL-CCNC: 31 U/L (ref 11–82)
LYMPHOCYTES # BLD AUTO: 2.01 K/UL (ref 1–4.8)
LYMPHOCYTES NFR BLD: 15.1 % (ref 22–41)
MCH RBC QN AUTO: 31.8 PG (ref 27–33)
MCHC RBC AUTO-ENTMCNC: 32.7 G/DL (ref 33.6–35)
MCV RBC AUTO: 97.2 FL (ref 81.4–97.8)
MICRO URNS: NORMAL
MONOCYTES # BLD AUTO: 0.79 K/UL (ref 0–0.85)
MONOCYTES NFR BLD AUTO: 5.9 % (ref 0–13.4)
NEUTROPHILS # BLD AUTO: 10.31 K/UL (ref 2–7.15)
NEUTROPHILS NFR BLD: 77.3 % (ref 44–72)
NITRITE UR QL STRIP.AUTO: NEGATIVE
NITRITE UR QL STRIP.AUTO: NEGATIVE
NRBC # BLD AUTO: 0 K/UL
NRBC BLD-RTO: 0 /100 WBC
PH UR STRIP.AUTO: 6 [PH] (ref 5–8)
PH UR STRIP.AUTO: 6.5 [PH] (ref 5–8)
PLATELET # BLD AUTO: 317 K/UL (ref 164–446)
PMV BLD AUTO: 9.9 FL (ref 9–12.9)
POC URINE PREGNANCY TEST: NEGATIVE
POTASSIUM SERPL-SCNC: 3.5 MMOL/L (ref 3.6–5.5)
PROT SERPL-MCNC: 7.2 G/DL (ref 6–8.2)
PROT UR QL STRIP: NEGATIVE MG/DL
PROT UR QL STRIP: NEGATIVE MG/DL
RBC # BLD AUTO: 4.31 M/UL (ref 4.2–5.4)
RBC UR QL AUTO: NEGATIVE
RBC UR QL AUTO: NORMAL
SODIUM SERPL-SCNC: 138 MMOL/L (ref 135–145)
SP GR UR STRIP.AUTO: 1
SP GR UR STRIP.AUTO: 1.03
UROBILINOGEN UR STRIP-MCNC: 1 MG/DL
UROBILINOGEN UR STRIP.AUTO-MCNC: 0.2 MG/DL
WBC # BLD AUTO: 13.3 K/UL (ref 4.8–10.8)

## 2021-02-02 PROCEDURE — 81025 URINE PREGNANCY TEST: CPT | Performed by: PHYSICIAN ASSISTANT

## 2021-02-02 PROCEDURE — 76705 ECHO EXAM OF ABDOMEN: CPT

## 2021-02-02 PROCEDURE — 80053 COMPREHEN METABOLIC PANEL: CPT

## 2021-02-02 PROCEDURE — 83690 ASSAY OF LIPASE: CPT

## 2021-02-02 PROCEDURE — 99214 OFFICE O/P EST MOD 30 MIN: CPT | Performed by: PHYSICIAN ASSISTANT

## 2021-02-02 PROCEDURE — 74177 CT ABD & PELVIS W/CONTRAST: CPT

## 2021-02-02 PROCEDURE — A9270 NON-COVERED ITEM OR SERVICE: HCPCS | Performed by: EMERGENCY MEDICINE

## 2021-02-02 PROCEDURE — 700111 HCHG RX REV CODE 636 W/ 250 OVERRIDE (IP): Performed by: EMERGENCY MEDICINE

## 2021-02-02 PROCEDURE — 81002 URINALYSIS NONAUTO W/O SCOPE: CPT | Performed by: PHYSICIAN ASSISTANT

## 2021-02-02 PROCEDURE — 96375 TX/PRO/DX INJ NEW DRUG ADDON: CPT

## 2021-02-02 PROCEDURE — 85025 COMPLETE CBC W/AUTO DIFF WBC: CPT

## 2021-02-02 PROCEDURE — 700102 HCHG RX REV CODE 250 W/ 637 OVERRIDE(OP): Performed by: EMERGENCY MEDICINE

## 2021-02-02 PROCEDURE — 36415 COLL VENOUS BLD VENIPUNCTURE: CPT

## 2021-02-02 PROCEDURE — 81003 URINALYSIS AUTO W/O SCOPE: CPT

## 2021-02-02 PROCEDURE — 96374 THER/PROPH/DIAG INJ IV PUSH: CPT

## 2021-02-02 PROCEDURE — 99284 EMERGENCY DEPT VISIT MOD MDM: CPT

## 2021-02-02 PROCEDURE — 700117 HCHG RX CONTRAST REV CODE 255: Performed by: EMERGENCY MEDICINE

## 2021-02-02 RX ORDER — ONDANSETRON 2 MG/ML
4 INJECTION INTRAMUSCULAR; INTRAVENOUS ONCE
Status: COMPLETED | OUTPATIENT
Start: 2021-02-02 | End: 2021-02-02

## 2021-02-02 RX ORDER — KETOROLAC TROMETHAMINE 30 MG/ML
30 INJECTION, SOLUTION INTRAMUSCULAR; INTRAVENOUS ONCE
Status: COMPLETED | OUTPATIENT
Start: 2021-02-02 | End: 2021-02-02

## 2021-02-02 RX ADMIN — LIDOCAINE HYDROCHLORIDE 30 ML: 20 SOLUTION OROPHARYNGEAL at 21:43

## 2021-02-02 RX ADMIN — KETOROLAC TROMETHAMINE 30 MG: 30 INJECTION, SOLUTION INTRAMUSCULAR at 21:42

## 2021-02-02 RX ADMIN — ONDANSETRON 4 MG: 2 INJECTION INTRAMUSCULAR; INTRAVENOUS at 21:41

## 2021-02-02 RX ADMIN — IOHEXOL 80 ML: 350 INJECTION, SOLUTION INTRAVENOUS at 23:32

## 2021-02-02 ASSESSMENT — ENCOUNTER SYMPTOMS
EYE DISCHARGE: 0
CHILLS: 0
NAUSEA: 0
ABDOMINAL PAIN: 1
BELCHING: 0
FLANK PAIN: 0
ANOREXIA: 0
FEVER: 0
BACK PAIN: 0
VOMITING: 0
DIARRHEA: 0
CONSTIPATION: 0
MYALGIAS: 0
EYE REDNESS: 0

## 2021-02-02 NOTE — TELEPHONE ENCOUNTER
Patient has had abdominal pain for the last 2-3 weeks in the RUQ. Patient does not feel it is so bad that she needs to go to the hospital. Is scheduled for virtual appointment with doctor tomorrow. Reviewed S/S to seek immediate treatment, patient agrees. Patient also discussed need for referral to new neurosurgeon for her neck.     Reason for Disposition  • MILD pain that comes and goes (cramps) lasts > 24 hours    Additional Information  • Negative: Passed out (i.e., fainted, collapsed and was not responding)  • Negative: Shock suspected (e.g., cold/pale/clammy skin, too weak to stand, low BP, rapid pulse)  • Negative: Visible sweat on face or sweat is dripping down  • Negative: Chest pain  • Negative: SEVERE abdominal pain (e.g., excruciating)  • Negative: Pain lasting > 10 minutes and over 50 years old  • Negative: Pain lasting > 10 minutes and over 40 years old and associated chest, arm, neck, upper back, or jaw pain  • Negative: Pain lasting > 10 minutes and over 35 years old and at least one cardiac risk factor  • Negative: Pain lasting > 10 minutes and history of heart disease (i.e., heart attack, bypass surgery, angina, angioplasty, CHF)  • Negative: Recent injury to the abdomen  • Negative: Vomiting red blood or black (coffee ground) material  • Negative: Bloody, black, or tarry bowel movements  • Negative: Pregnant > 24 weeks and hand or face swelling  • Negative: Constant abdominal pain lasting > 2 hours  • Negative: Vomiting bile (green color)  • Negative: Patient sounds very sick or weak to the triager  • Negative: Vomiting and abdomen looks much more swollen than usual  • Negative: White of the eyes have turned yellow (i.e.,  jaundice)  • Negative: Fever > 103 F (39.4 C)  • Negative: Fever > 101 F (38.3 C) and over 60 years of age  • Negative: Fever > 100.0 F (37.8 C) and has diabetes mellitus or a weak immune system (e.g., HIV positive, cancer chemotherapy, organ transplant, splenectomy, chronic  "steroids)  • Negative: Fever > 100.0 F (37.8 C) and bedridden (e.g., nursing home patient, stroke, chronic illness, recovering from surgery)  • Negative: Age > 60 years  • Negative: Patient wants to be seen    Answer Assessment - Initial Assessment Questions  1. LOCATION: \"Where does it hurt?\"       Upper right side of midline  2. RADIATION: \"Does the pain shoot anywhere else?\" (e.g., chest, back)   no raditaion  3. ONSET: \"When did the pain begin?\" (e.g., minutes, hours or days ago)   For a few weeks 2-3  4. SUDDEN: \"Gradual or sudden onset?\"      constant  5. PATTERN \"Does the pain come and go, or is it constant?\"     - If constant: \"Is it getting better, staying the same, or worsening?\"       (Note: Constant means the pain never goes away completely; most serious pain is constant and it progresses)      - If intermittent: \"How long does it last?\" \"Do you have pain now?\"      (Note: Intermittent means the pain goes away completely between bouts)    Constant more uncomfortable after eating meals  6. SEVERITY: \"How bad is the pain?\"  (e.g., Scale 1-10; mild, moderate, or severe)     - MILD (1-3): doesn't interfere with normal activities, abdomen soft and not tender to touch      - MODERATE (4-7): interferes with normal activities or awakens from sleep, tender to touch      - SEVERE (8-10): excruciating pain, doubled over, unable to do any normal activities    Pressure and discomfort of 5-6 of 10 at worst after meals  7. RECURRENT SYMPTOM: \"Have you ever had this type of abdominal pain before?\" If so, ask: \"When was the last time?\" and \"What happened that time?\"   never        8. AGGRAVATING FACTORS: \"Does anything seem to cause this pain?\" (e.g., foods, stress, alcohol)  none  9. CARDIAC SYMPTOMS: \"Do you have any of the following symptoms: chest pain, difficulty breathing, sweating, nausea?\"  none  10. OTHER SYMPTOMS: \"Do you have any other symptoms?\" (e.g., fever, vomiting, diarrhea)   none  11. PREGNANCY: \"Is " "there any chance you are pregnant?\" \"When was your last menstrual period?\"  none    Protocols used: ABDOMINAL PAIN - UPPER-A-OH    "

## 2021-02-02 NOTE — TELEPHONE ENCOUNTER
Regarding:  Patient is experiencing UPPER ABDOMINAL PAIN AND PRESSURE.  ----- Message from Verona Hoffman sent at 2/2/2021 11:18 AM PST -----  Patient inbound TC to [facility] Scheduling to make a SONNER appointment with PCP. Patient is experiencing UPPER ABDOMINAL PAIN AND PRESSURE. PT DECLINED TRANSFER 911. I encouraged patient to speak with Nurse Advice Team per active symptoms protocol and patient agreed.

## 2021-02-03 ENCOUNTER — TELEMEDICINE (OUTPATIENT)
Dept: MEDICAL GROUP | Facility: MEDICAL CENTER | Age: 46
End: 2021-02-03
Payer: COMMERCIAL

## 2021-02-03 VITALS
SYSTOLIC BLOOD PRESSURE: 112 MMHG | BODY MASS INDEX: 20.89 KG/M2 | DIASTOLIC BLOOD PRESSURE: 59 MMHG | WEIGHT: 130 LBS | HEIGHT: 66 IN

## 2021-02-03 VITALS
WEIGHT: 128.97 LBS | OXYGEN SATURATION: 95 % | DIASTOLIC BLOOD PRESSURE: 62 MMHG | SYSTOLIC BLOOD PRESSURE: 120 MMHG | HEART RATE: 84 BPM | HEIGHT: 66 IN | RESPIRATION RATE: 16 BRPM | BODY MASS INDEX: 20.73 KG/M2 | TEMPERATURE: 97.8 F

## 2021-02-03 DIAGNOSIS — M54.2 NECK PAIN: ICD-10-CM

## 2021-02-03 DIAGNOSIS — R10.10 PAIN OF UPPER ABDOMEN: ICD-10-CM

## 2021-02-03 PROCEDURE — 99213 OFFICE O/P EST LOW 20 MIN: CPT | Mod: 95,CR | Performed by: FAMILY MEDICINE

## 2021-02-03 PROCEDURE — 700111 HCHG RX REV CODE 636 W/ 250 OVERRIDE (IP): Performed by: EMERGENCY MEDICINE

## 2021-02-03 RX ORDER — MORPHINE SULFATE 4 MG/ML
4 INJECTION, SOLUTION INTRAMUSCULAR; INTRAVENOUS ONCE
Status: DISCONTINUED | OUTPATIENT
Start: 2021-02-03 | End: 2021-02-03 | Stop reason: HOSPADM

## 2021-02-03 RX ORDER — PANTOPRAZOLE SODIUM 40 MG/1
40 TABLET, DELAYED RELEASE ORAL DAILY
Qty: 30 TAB | Refills: 0 | Status: SHIPPED | OUTPATIENT
Start: 2021-02-03 | End: 2021-09-28

## 2021-02-03 ASSESSMENT — FIBROSIS 4 INDEX: FIB4 SCORE: 0.72

## 2021-02-03 NOTE — ASSESSMENT & PLAN NOTE
Seen in ER.   Has been working with bonny ojeda neurosurgery and physiatry Dr. Odom with [facility] neurosurgery (had discectomy)   Taking pain medication (gabapentin only) long term , switching to lyrica.  Having persistent pain upper extremities   Doing EMG and following up with Dr. Ojeda in march     Interested in possible second opinion

## 2021-02-03 NOTE — ED TRIAGE NOTES
Nauseous, RUQ abdominal pain for 3 weeks that have become worse tonight.  Normal BM.  No abdominal surgeries in the past.

## 2021-02-03 NOTE — DISCHARGE INSTRUCTIONS
CT and ultrasound shows no cause for your abdominal pain.  One area that cannot be seen well is the stomach and esophagus area.  Sometimes too much acid causes irritation pain.    You are being placed on a antiacid type medication, this will take about a week to resolve the symptoms in the meantime use Tylenol for pain.    There was a small nodule on the lung, below his recommendations from the radiologist, please see your primary care doctor to follow this up.  4 mm nonspecific noncalcified right lower lobe pulmonary nodule. Follow-up per Fleischner criteria as clinically indicated.        Low Risk: No routine follow-up     High Risk: Optional CT at 12 months     Comments: Nodules less than 6 mm do not require routine follow-up, but certain patients at high risk with suspicious nodule morphology, upper lobe location, or both may warrant 12-month follow-up.     Low Risk - Minimal or absent history of smoking and of other known risk factors.     High Risk - History of smoking or of other known risk factors.

## 2021-02-03 NOTE — PROGRESS NOTES
Virtual Visit: Established Patient   This visit was conducted via Zoom using secure and encrypted videoconferencing technology. The patient was in a private location in the state of [place].    The patient's identity was confirmed and verbal consent was obtained for this virtual visit.    Subjective:   CC:   Chief Complaint   Patient presents with   • Abdominal Pain     FV on abd RUQ pain went to UC and ER   • Referral Needed     possibly to neurosurgeon       Reanna Ojeda is a 45 y.o. female presenting for evaluation and management of:    Follow up ER and discussed hari    ROS   Denies any recent fevers or chills. No nausea or vomiting. No chest pains or shortness of breath.     No Known Allergies    Current medicines (including changes today)  Current Outpatient Medications   Medication Sig Dispense Refill   • sertraline (ZOLOFT) 25 MG tablet Take 25 mg by mouth every day.     • gabapentin (NEURONTIN) 300 MG Cap Take 600 mg by mouth 3 times a day.     • norgestrel-ethinyl estradiol (LO-OVRAL) 0.3-30 MG-MCG Tab Take active pills continuously to suppress menses 84 Tab 3   • lamotrigine (LAMICTAL) 200 MG tablet Take 400 mg by mouth every evening.     • traZODone (DESYREL) 100 MG Tab Take 100 mg by mouth every evening.     • pantoprazole (PROTONIX) 40 MG Tablet Delayed Response Take 1 Tab by mouth every day. (Patient not taking: Reported on 2/3/2021) 30 Tab 0     No current facility-administered medications for this visit.        Patient Active Problem List    Diagnosis Date Noted   • Lung nodule < 6cm on CT 02/03/2021   • Pain of upper abdomen 02/03/2021   • Spotting 02/05/2020   • Neck pain 01/06/2020   • Stress due to illness of family member 10/28/2019   • Neoplasm of uncertain behavior of skin 10/23/2019   • Coccydynia 10/23/2019   • Atypical pigmented skin lesion 10/09/2019   • Perimenopause 08/26/2019   • Annual physical exam 08/26/2019   • Encounter for surveillance of contraceptive pills 08/15/2018   •  "Primary insomnia 08/15/2018   • Bipolar 2 disorder (HCC) 08/15/2018   • Preventative health care 08/15/2018   • Breast lump in female 08/15/2018       Family History   Problem Relation Age of Onset   • Hypertension Mother    • Hypertension Father    • Cancer Father    • No Known Problems Sister        She  has a past medical history of Autoimmune disease (HCC), Bipolar 2 disorder (HCC), and Cervical pain (neck).  She  has a past surgical history that includes breast biopsy (Left, 9/28/2018); lumpectomy (Left, 09/18/2019); and cervical disk and fusion anterior (5/22/2020).       Objective:   /59 (BP Location: Left arm, Patient Position: Sitting, BP Cuff Size: Adult) Comment: pt stated  Ht 1.676 m (5' 6\") Comment: pt stated  Wt 59 kg (130 lb) Comment: pt stated  LMP 01/09/2021 (Approximate)   BMI 20.98 kg/m²     Physical Exam:  Constitutional: Alert, no distress, well-groomed.  Skin: No rashes in visible areas.  Eye: Round. Conjunctiva clear, lids normal. No icterus.   ENMT: Lips pink without lesions, good dentition, moist mucous membranes. Phonation normal.  Neck: No masses, no thyromegaly. Moves freely without pain.  Respiratory: Unlabored respiratory effort, no cough or audible wheeze  Psych: Alert and oriented x3, normal affect and mood.       Assessment and Plan:   The following treatment plan was discussed:     1. Neck pain  - REFERRAL TO NEUROSURGERY    2. Lung nodule < 6cm on CT    3. Pain of upper abdomen      Problem List Items Addressed This Visit     Neck pain     Seen in ER.   Has been working with bonny ojeda neurosurgery and physiatry Dr. Odom with [facility] neurosurgery (had discectomy)   Taking pain medication (gabapentin only) long term , switching to lyrica.  Having persistent pain upper extremities   Doing EMG and following up with Dr. Ojeda in march     Interested in possible second opinion                      Relevant Orders    REFERRAL TO NEUROSURGERY    Lung nodule < 6cm on CT     " Consider 1 year CT follow up as she has h/o smoking            Pain of upper abdomen     Went to er   We review ER ecnounter   US and CT scan   She is feeling much better today   She may try PPI   She will let us know if symptoms worsen or fail to improve and we will consult with GI                      Follow-up: No follow-ups on file.

## 2021-02-03 NOTE — ASSESSMENT & PLAN NOTE
Went to er   We review ER ecnounter   US and CT scan   She is feeling much better today   She may try PPI   She will let us know if symptoms worsen or fail to improve and we will consult with GI

## 2021-02-03 NOTE — PROGRESS NOTES
"Subjective:      Reanna Ojeda is a 45 y.o. female who presents with Abdominal Pain (x 3 weeks)            Patient is a 45-year-old female who presents to urgent care with right upper quadrant abdominal pain for the last 3 weeks.  Patient reports that the pain has started off as a pressure and has remain present daily.  She does report that the pressure is always there however much worse after food.  Patient admits she eats very healthy-and in general avoids fatty foods, greasy foods.  She reports after dinner this evening of rice, vegetables and slight chicken of which she did have decreased appetite as she felt \"full \"her abdominal pain has profoundly worse.  She denies any nausea, vomiting or diarrhea, urinary symptoms, blood in her urine, radiation of pain.  Patient does not drink alcohol.  She denies prior history of abdominal surgeries.  Last normal menstrual cycle was 3 weeks ago.    Abdominal Pain  This is a new problem. The current episode started 1 to 4 weeks ago. The onset quality is gradual. The problem occurs daily. The problem has been rapidly worsening. The pain is located in the RUQ. The pain is moderate. The quality of the pain is cramping (Pressure). The abdominal pain does not radiate. Pertinent negatives include no anorexia, belching, constipation, diarrhea, dysuria, fever, frequency, hematuria, myalgias, nausea or vomiting. Exacerbated by: Eating. The pain is relieved by nothing. She has tried nothing for the symptoms. There is no history of abdominal surgery.       Review of Systems   Constitutional: Negative for chills and fever.   Eyes: Negative for discharge and redness.   Gastrointestinal: Positive for abdominal pain. Negative for anorexia, constipation, diarrhea, nausea and vomiting.   Genitourinary: Negative for dysuria, flank pain, frequency, hematuria and urgency.   Musculoskeletal: Negative for back pain, joint pain and myalgias.   Skin: Negative for rash.   All other systems " "reviewed and are negative.         Objective:     /80 (BP Location: Left arm, Patient Position: Sitting, BP Cuff Size: Adult)   Pulse 75   Temp 36.2 °C (97.1 °F) (Temporal)   Resp 14   Ht 1.676 m (5' 6\")   Wt 58.5 kg (129 lb)   LMP 01/09/2021 (Approximate)   SpO2 95%   Breastfeeding No   BMI 20.82 kg/m²    PMH:  has a past medical history of Autoimmune disease (HCC), Bipolar 2 disorder (HCC), and Cervical pain (neck).  MEDS: Reviewed .   ALLERGIES: No Known Allergies  SURGHX:   Past Surgical History:   Procedure Laterality Date   • CERVICAL DISK AND FUSION ANTERIOR  5/22/2020    Procedure: DISCECTOMY, SPINE, CERVICAL, ANTERIOR APPROACH, WITH FUSION-FOR ANTERIOR C5-6 ARTHROPLASY;  Surgeon: Jonel Ojeda M.D.;  Location: SURGERY [facility];  Service: Neurosurgery   • LUMPECTOMY Left 09/18/2019   • BREAST BIOPSY Left 9/28/2018    Procedure: BREAST BIOPSY- FOR EXCISION OF BREAST MASS;  Surgeon: Shraddha Moss M.D.;  Location: SURGERY SAME DAY [facility];  Service: General     SOCHX:  reports that she quit smoking about 21 months ago. Her smoking use included cigarettes. She has a 16.50 pack-year smoking history. She has never used smokeless tobacco. She reports that she does not drink alcohol or use drugs.  FH: Family history was reviewed, no pertinent findings to report    Physical Exam  Vitals signs reviewed.   Constitutional:       General: She is in acute distress.      Appearance: She is well-developed.   HENT:      Head: Normocephalic and atraumatic.      Right Ear: External ear normal.      Left Ear: External ear normal.      Mouth/Throat:      Pharynx: No oropharyngeal exudate.   Eyes:      Conjunctiva/sclera: Conjunctivae normal.      Pupils: Pupils are equal, round, and reactive to light.   Neck:      Musculoskeletal: Normal range of motion and neck supple.      Trachea: No tracheal deviation.   Cardiovascular:      Rate and Rhythm: Normal rate and regular rhythm.      Heart sounds: " No murmur.   Pulmonary:      Effort: Pulmonary effort is normal. No respiratory distress.      Breath sounds: Normal breath sounds.   Abdominal:      General: Bowel sounds are normal.      Tenderness: There is abdominal tenderness in the right upper quadrant. There is no right CVA tenderness, left CVA tenderness or guarding. Positive signs include López's sign. Negative signs include McBurney's sign.       Musculoskeletal: Normal range of motion.   Skin:     General: Skin is warm.      Findings: No rash.   Neurological:      Mental Status: She is alert and oriented to person, place, and time.      Coordination: Coordination normal.   Psychiatric:         Behavior: Behavior normal.         Thought Content: Thought content normal.         Judgment: Judgment normal.                 Assessment/Plan:        1. RUQ pain  - POCT Urinalysis  - POCT Pregnancy    UA- trace blood.   HCG negative.   Imaging and labs were offered tomorrow unfortunately outpatient imaging and labs are no longer available.  On further discussion with patient and her  she believes that she is too uncomfortable to have such done tomorrow.  I believe this is reasonable as potential waiting on imaging may be a potential delay in care.  Patient was clearly instructed to remain n.p.o. until further evaluation in the emergency room.  She is uncertain which ED she will go to but is agreeable at this time.  Pt left in stable condition via POV.     Please note that this dictation was created using voice recognition software. I have made every reasonable attempt to correct obvious errors, but I expect that there are errors of grammar and possibly content that I did not discover before finalizing the note.

## 2021-02-03 NOTE — ED NOTES
Assessment made. Chart up for MD to see. C/o RUQ pain off and on x 2-3 weeks. Pain worse with food.

## 2021-02-03 NOTE — ED PROVIDER NOTES
ED Provider6    Scribed for Gagandeep Landa D.O. by Mason Oh. 2021  9:11 PM    Means of arrival: Walk-in  History obtained from: Patient  History limited by: None    CHIEF COMPLAINT  Chief Complaint   Patient presents with   • Abdominal Pain       HPI  Reanna Ojeda is a 45 y.o. female who presents for evaluation of acute abdominal pain onset several weeks prior to arrival. She reports that initially it was intermittent and over the last several days it has become more intense and constant. Tonight the pain has been waxing and waning, though she does not know of any exacerbating factors. She denies any associated vomiting, diarrhea, fever, or chills. She was seen at urgent care and sent here for further evaluation. 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    REVIEW OF SYSTEMS  See HPI for further details. All other systems are negative.     PAST MEDICAL HISTORY   has a past medical history of Autoimmune disease (HCC), Bipolar 2 disorder (HCC), and Cervical pain (neck).    SOCIAL HISTORY  Social History     Tobacco Use   • Smoking status: Former Smoker     Packs/day: 0.50     Years: 33.00     Pack years: 16.50     Types: Cigarettes     Quit date: 2019     Years since quittin.8   • Smokeless tobacco: Never Used   • Tobacco comment: Smoking cessation   Substance and Sexual Activity   • Alcohol use: No     Comment: Sober x 22 years   • Drug use: No     Comment: Sober x 22 years   • Sexual activity: Yes     Partners: Male     Comment:        SURGICAL HISTORY   has a past surgical history that includes breast biopsy (Left, 2018); lumpectomy (Left, 2019); and cervical disk and fusion anterior (2020).    CURRENT MEDICATIONS  Current Outpatient Medications   Medication Instructions   • gabapentin (NEURONTIN) 600 mg, Oral, 3 TIMES DAILY   • lamotrigine (LAMICTAL) 400 mg, Oral, EVERY EVENING   • norgestrel-ethinyl estradiol (LO-OVRAL) 0.3-30 MG-MCG Tab Take  "active pills continuously to suppress menses   • sertraline (ZOLOFT) 25 mg, Oral, DAILY   • traZODone (DESYREL) 100 mg, Oral, NIGHTLY       ALLERGIES  No Known Allergies    PHYSICAL EXAM  VITAL SIGNS: /62   Pulse 73   Temp 36.6 °C (97.8 °F) (Temporal)   Resp 16   Ht 1.676 m (5' 6\")   Wt 58.5 kg (128 lb 15.5 oz)   LMP 01/09/2021 (Approximate)   SpO2 98%   BMI 20.82 kg/m²   Constitutional: Alert in no apparent distress.  HENT: No signs of trauma, mucous membranes are moist  Eyes: Conjunctiva normal, Non-icteric.   Neck: Normal range of motion, No tenderness, Supple.  Lymphatic: No lymphadenopathy noted.   Cardiovascular: Regular rate and rhythm, no murmurs.   Thorax & Lungs: Normal breath sounds, No respiratory distress, No wheezing, No chest tenderness.   Abdomen: Midepigastric and right upper quadrant tenderness with no guarding, rigidity, or rebound. Bowel sounds normal, Soft, No masses, No pulsatile masses. No peritoneal signs.  Skin: Warm, Dry, normal color.   Back: No bony tenderness, No CVA tenderness.   Extremities: No edema, No tenderness, No cyanosis  Musculoskeletal: Good range of motion in all major joints. No tenderness to palpation or major deformities noted.   Neurologic: Alert and oriented x4, Normal motor function, Normal sensory function, No focal deficits noted.   Psychiatric: Affect normal, Judgment normal, Mood normal.     DIAGNOSTIC STUDIES / PROCEDURES    LABS  Results for orders placed or performed during the hospital encounter of 02/02/21   CBC WITH DIFFERENTIAL   Result Value Ref Range    WBC 13.3 (H) 4.8 - 10.8 K/uL    RBC 4.31 4.20 - 5.40 M/uL    Hemoglobin 13.7 12.0 - 16.0 g/dL    Hematocrit 41.9 37.0 - 47.0 %    MCV 97.2 81.4 - 97.8 fL    MCH 31.8 27.0 - 33.0 pg    MCHC 32.7 (L) 33.6 - 35.0 g/dL    RDW 45.1 35.9 - 50.0 fL    Platelet Count 317 164 - 446 K/uL    MPV 9.9 9.0 - 12.9 fL    Neutrophils-Polys 77.30 (H) 44.00 - 72.00 %    Lymphocytes 15.10 (L) 22.00 - 41.00 %    " Monocytes 5.90 0.00 - 13.40 %    Eosinophils 0.70 0.00 - 6.90 %    Basophils 0.40 0.00 - 1.80 %    Immature Granulocytes 0.60 0.00 - 0.90 %    Nucleated RBC 0.00 /100 WBC    Neutrophils (Absolute) 10.31 (H) 2.00 - 7.15 K/uL    Lymphs (Absolute) 2.01 1.00 - 4.80 K/uL    Monos (Absolute) 0.79 0.00 - 0.85 K/uL    Eos (Absolute) 0.09 0.00 - 0.51 K/uL    Baso (Absolute) 0.06 0.00 - 0.12 K/uL    Immature Granulocytes (abs) 0.08 0.00 - 0.11 K/uL    NRBC (Absolute) 0.00 K/uL   COMP METABOLIC PANEL   Result Value Ref Range    Sodium 138 135 - 145 mmol/L    Potassium 3.5 (L) 3.6 - 5.5 mmol/L    Chloride 104 96 - 112 mmol/L    Co2 23 20 - 33 mmol/L    Anion Gap 11.0 7.0 - 16.0    Glucose 99 65 - 99 mg/dL    Bun 12 8 - 22 mg/dL    Creatinine 0.61 0.50 - 1.40 mg/dL    Calcium 9.6 8.5 - 10.5 mg/dL    AST(SGOT) 18 12 - 45 U/L    ALT(SGPT) 13 2 - 50 U/L    Alkaline Phosphatase 56 30 - 99 U/L    Total Bilirubin 0.3 0.1 - 1.5 mg/dL    Albumin 4.3 3.2 - 4.9 g/dL    Total Protein 7.2 6.0 - 8.2 g/dL    Globulin 2.9 1.9 - 3.5 g/dL    A-G Ratio 1.5 g/dL   LIPASE   Result Value Ref Range    Lipase 31 11 - 82 U/L   URINALYSIS    Specimen: Urine   Result Value Ref Range    Color Yellow     Character Clear     Specific Gravity 1.005 <1.035    Ph 6.5 5.0 - 8.0    Glucose Negative Negative mg/dL    Ketones Negative Negative mg/dL    Protein Negative Negative mg/dL    Bilirubin Negative Negative    Urobilinogen, Urine 0.2 Negative    Nitrite Negative Negative    Leukocyte Esterase Negative Negative    Occult Blood Negative Negative    Micro Urine Req see below    ESTIMATED GFR   Result Value Ref Range    GFR If African American >60 >60 mL/min/1.73 m 2    GFR If Non African American >60 >60 mL/min/1.73 m 2     All labs reviewed by me.    RADIOLOGY  CT-ABDOMEN-PELVIS WITH   Final Result         No acute abnormality.      Small uterine fibroid.      4 mm nonspecific noncalcified right lower lobe pulmonary nodule. Follow-up per Fleischner criteria  as clinically indicated.         Low Risk: No routine follow-up      High Risk: Optional CT at 12 months      Comments: Nodules less than 6 mm do not require routine follow-up, but certain patients at high risk with suspicious nodule morphology, upper lobe location, or both may warrant 12-month follow-up.      Low Risk - Minimal or absent history of smoking and of other known risk factors.      High Risk - History of smoking or of other known risk factors.      Note: These recommendations do not apply to lung cancer screening, patients with immunosuppression, or patients with known primary cancer.      Fleischner Society 2017 Guidelines for Management of Incidentally Detected Pulmonary Nodules in Adults      US-RUQ   Final Result      Normal right upper quadrant ultrasound.        The radiologist's interpretations of all radiological studies have been reviewed by me.    Films have been independently by me      COURSE  Pertinent Labs & Imaging studies reviewed. (See chart for details)    9:11 PM - Patient seen and examined at bedside. Discussed plan of care. The patient will be medicated with Toradol, Zofran, and GI cocktail. Ordered for US-RUQ, CBC with Differential, CMP, Lipase, and UA to evaluate her symptoms.     11:13 PM - Patient was reevaluated at bedside. Updated her on ultrasound results and informed her of the need for CT. Ordered CT-abdomen-pelvis. Transient improvement of pain after medications however pain is returning. She declined additional pain medication at this time.       MEDICAL DECISION MAKING  This is a 45 y.o. female who presents with right upper quadrant pain but she does have tenderness in the epigastric area.  Concerns for cholecystitis cholelithiasis and cholestasis were excluded using ultrasound and lab test.  With that being negative and the significance of her pain there is concerns for other causes such as appendicitis and obstruction which were negative on CT.    1 area that cannot be  visualized well is the gastric and esophageal area where she is mildly tender this may be gastritis versus esophagitis so should be placed on proton pump inhibitor.    There is uterine fibroids which is benign.  And there is a nodule in the lungs which will require further evaluation and follow-up.  I did explain these findings to the patient and the need for follow-up.        The patient will return for new or worsening symptoms and is stable at the time of discharge.    The patient is referred to a primary physician for blood pressure management, diabetic screening, and for all other preventative health concerns.    DISPOSITION:  Patient will be discharged home in stable condition.    FOLLOW UP:  Rosalva Garcia M.D.  [address]  [address]  [facility] [id number] [phone]    In 3 days        OUTPATIENT MEDICATIONS:  New Prescriptions    PANTOPRAZOLE (PROTONIX) 40 MG TABLET DELAYED RESPONSE    Take 1 Tab by mouth every day.       FINAL IMPRESSION  1. Right upper quadrant abdominal pain         Mason BECKER (Susana), am scribing for, and in the presence of, Gagandeep Landa D.O..    Electronically signed by: Mason Oh (Susana), 2/2/2021    IGagandeep D.O. personally performed the services described in this documentation, as scribed by Mason Oh in my presence, and it is both accurate and complete.    The note accurately reflects work and decisions made by me.  Gagandeep Landa D.O.  2/3/2021  12:11 AM

## 2021-04-27 ENCOUNTER — IMMUNIZATION (OUTPATIENT)
Dept: FAMILY PLANNING/WOMEN'S HEALTH CLINIC | Facility: IMMUNIZATION CENTER | Age: 46
End: 2021-04-27
Payer: COMMERCIAL

## 2021-04-27 DIAGNOSIS — Z23 ENCOUNTER FOR VACCINATION: Primary | ICD-10-CM

## 2021-04-27 PROCEDURE — 91300 PFIZER SARS-COV-2 VACCINE: CPT

## 2021-04-27 PROCEDURE — 0001A PFIZER SARS-COV-2 VACCINE: CPT

## 2021-05-21 ENCOUNTER — IMMUNIZATION (OUTPATIENT)
Dept: FAMILY PLANNING/WOMEN'S HEALTH CLINIC | Facility: IMMUNIZATION CENTER | Age: 46
End: 2021-05-21
Payer: COMMERCIAL

## 2021-05-21 DIAGNOSIS — Z23 ENCOUNTER FOR VACCINATION: Primary | ICD-10-CM

## 2021-05-21 PROCEDURE — 0002A PFIZER SARS-COV-2 VACCINE: CPT

## 2021-05-21 PROCEDURE — 91300 PFIZER SARS-COV-2 VACCINE: CPT

## 2021-08-24 DIAGNOSIS — Z30.41 ENCOUNTER FOR SURVEILLANCE OF CONTRACEPTIVE PILLS: ICD-10-CM

## 2021-08-24 RX ORDER — NORGESTREL AND ETHINYL ESTRADIOL 0.3-0.03MG
KIT ORAL
Qty: 84 TABLET | Refills: 0 | Status: SHIPPED | OUTPATIENT
Start: 2021-08-24 | End: 2021-11-02 | Stop reason: SDUPTHER

## 2021-09-07 ENCOUNTER — HOSPITAL ENCOUNTER (OUTPATIENT)
Dept: RADIOLOGY | Facility: MEDICAL CENTER | Age: 46
End: 2021-09-07
Attending: FAMILY MEDICINE
Payer: COMMERCIAL

## 2021-09-07 DIAGNOSIS — Z12.31 VISIT FOR SCREENING MAMMOGRAM: ICD-10-CM

## 2021-09-07 PROCEDURE — 77063 BREAST TOMOSYNTHESIS BI: CPT

## 2021-09-28 ENCOUNTER — TELEMEDICINE (OUTPATIENT)
Dept: MEDICAL GROUP | Facility: MEDICAL CENTER | Age: 46
End: 2021-09-28
Payer: COMMERCIAL

## 2021-09-28 VITALS — WEIGHT: 125 LBS | BODY MASS INDEX: 20.09 KG/M2 | HEIGHT: 66 IN

## 2021-09-28 DIAGNOSIS — N95.1 PERIMENOPAUSE: ICD-10-CM

## 2021-09-28 DIAGNOSIS — Z13.6 SCREENING FOR CARDIOVASCULAR CONDITION: ICD-10-CM

## 2021-09-28 DIAGNOSIS — Z00.00 WELLNESS EXAMINATION: ICD-10-CM

## 2021-09-28 DIAGNOSIS — R53.83 FATIGUE, UNSPECIFIED TYPE: ICD-10-CM

## 2021-09-28 PROCEDURE — 99213 OFFICE O/P EST LOW 20 MIN: CPT | Mod: 95 | Performed by: PHYSICIAN ASSISTANT

## 2021-09-28 RX ORDER — PREGABALIN 75 MG/1
75 CAPSULE ORAL 2 TIMES DAILY
COMMUNITY
Start: 2021-07-31 | End: 2021-11-02

## 2021-09-28 ASSESSMENT — FIBROSIS 4 INDEX: FIB4 SCORE: 0.74

## 2021-09-28 NOTE — ASSESSMENT & PLAN NOTE
Patient is on oral birth control pills to prevent pregnancy. Feels she is perimenopausal and would like lab work to confirm.  Also feeling constantly tired for months. Was having periods regularly until this month. Negative pregnancy home test. Periods have been getting lighter and shorter for a few months. No hot flashes or night sweats. Does have insomnia already, on antidepressants to help with that. No major mood swings. Feels always tired and sleepy, does take naps in the afternoon over the last few months. Not on multi vitamin. Not vegan. No known h/o anemia, thyroid disorder, sleep apnea. She does have depression but it doesn't normally cause fatigue.

## 2021-09-28 NOTE — PROGRESS NOTES
Virtual Visit: Established Patient   This visit was conducted via Zoom using secure and encrypted videoconferencing technology.   The patient was in a private location in the state of [place].    The patient's identity was confirmed and verbal consent was obtained for this virtual visit.    Subjective:   CC:   Chief Complaint   Patient presents with   • Other     Corinna Menoupause/ tired and periods for the last 6 months lighter    • Other     Order labs      Reanna Ojeda is a 46 y.o. female presenting for evaluation and management of:    Perimenopause  Patient is on oral birth control pills to prevent pregnancy. Feels she is perimenopausal and would like lab work to confirm.  Also feeling constantly tired for months. Was having periods regularly until this month. Negative pregnancy home test. Periods have been getting lighter and shorter for a few months. No hot flashes or night sweats. Does have insomnia already, on antidepressants to help with that. No major mood swings. Feels always tired and sleepy, does take naps in the afternoon over the last few months. Not on multi vitamin. Not vegan. No known h/o anemia, thyroid disorder, sleep apnea. She does have depression but it doesn't normally cause fatigue.      ROS   No weight change. No fever/chills. No night sweats. No chest pain, SOB or difficulty breathing. No urinary symptoms.     Current medicines (including changes today)  Current Outpatient Medications   Medication Sig Dispense Refill   • pregabalin (LYRICA) 75 MG Cap Take 75 mg by mouth 2 times a day.     • norgestrel-ethinyl estradiol (ELINEST) 0.3-30 MG-MCG Tab TAKE ONE TABLET BY MOUTH ONCE DAILY 84 Tablet 0   • sertraline (ZOLOFT) 25 MG tablet Take 25 mg by mouth every day.     • lamotrigine (LAMICTAL) 200 MG tablet Take 400 mg by mouth every evening.     • traZODone (DESYREL) 100 MG Tab Take 100 mg by mouth every evening.       No current facility-administered medications for this visit.       Patient  "Active Problem List    Diagnosis Date Noted   • Lung nodule < 6cm on CT 02/03/2021   • Pain of upper abdomen 02/03/2021   • Spotting 02/05/2020   • Neck pain 01/06/2020   • Stress due to illness of family member 10/28/2019   • Neoplasm of uncertain behavior of skin 10/23/2019   • Coccydynia 10/23/2019   • Atypical pigmented skin lesion 10/09/2019   • Perimenopause 08/26/2019   • Annual physical exam 08/26/2019   • Encounter for surveillance of contraceptive pills 08/15/2018   • Primary insomnia 08/15/2018   • Bipolar 2 disorder (HCC) 08/15/2018   • Preventative health care 08/15/2018   • Breast lump in female 08/15/2018        Objective:   Ht 1.676 m (5' 6\") Comment: pt stated  Wt 56.7 kg (125 lb) Comment: pt stated  LMP 08/15/2021 (Approximate)   BMI 20.18 kg/m²     Physical Exam:  Constitutional: Alert, no distress, well-groomed.  Skin: No rashes in visible areas.  Eye: Round. Conjunctiva clear, lids normal. No icterus.   ENMT: Lips pink without lesions, good dentition, moist mucous membranes. Phonation normal.  Neck: No masses, no thyromegaly. Moves freely without pain.  Respiratory: Unlabored respiratory effort, no cough or audible wheeze  Psych: Alert and oriented x3, normal affect and mood.     Assessment and Plan:   The following treatment plan was discussed:     1. Perimenopause  - ESTROGENS FRACTIONATED; Future  - FSH; Future  - LUTEINIZING HORMONE SERUM; Future    2. Screening for cardiovascular condition  - Lipid Profile; Future    3. Wellness examination  - CBC WITH DIFFERENTIAL; Future  - Comp Metabolic Panel; Future    4. Fatigue, unspecified type  - TSH; Future  - FREE THYROXINE; Future  - TRIIDOTHYRONINE; Future  - IRON/TOTAL IRON BIND; Future  - VITAMIN B12; Future  - FOLATE; Future  - VITAMIN D,25 HYDROXY; Future    Other orders  - pregabalin (LYRICA) 75 MG Cap; Take 75 mg by mouth 2 times a day.      Follow-up: f/u with Dr. Gacria for well woman and lab review         "

## 2021-10-14 ENCOUNTER — HOSPITAL ENCOUNTER (OUTPATIENT)
Facility: MEDICAL CENTER | Age: 46
End: 2021-10-14
Attending: NURSE PRACTITIONER
Payer: COMMERCIAL

## 2021-10-14 ENCOUNTER — APPOINTMENT (OUTPATIENT)
Dept: RADIOLOGY | Facility: IMAGING CENTER | Age: 46
End: 2021-10-14
Attending: NURSE PRACTITIONER
Payer: COMMERCIAL

## 2021-10-14 ENCOUNTER — OFFICE VISIT (OUTPATIENT)
Dept: URGENT CARE | Facility: CLINIC | Age: 46
End: 2021-10-14
Payer: COMMERCIAL

## 2021-10-14 VITALS
HEART RATE: 86 BPM | OXYGEN SATURATION: 98 % | TEMPERATURE: 98 F | DIASTOLIC BLOOD PRESSURE: 74 MMHG | HEIGHT: 66 IN | WEIGHT: 116 LBS | RESPIRATION RATE: 14 BRPM | BODY MASS INDEX: 18.64 KG/M2 | SYSTOLIC BLOOD PRESSURE: 112 MMHG

## 2021-10-14 DIAGNOSIS — R06.02 SOB (SHORTNESS OF BREATH): ICD-10-CM

## 2021-10-14 DIAGNOSIS — R05.9 COUGH: ICD-10-CM

## 2021-10-14 DIAGNOSIS — J40 BRONCHITIS: ICD-10-CM

## 2021-10-14 LAB
EXTERNAL QUALITY CONTROL: NORMAL
SARS-COV+SARS-COV-2 AG RESP QL IA.RAPID: NEGATIVE

## 2021-10-14 PROCEDURE — U0005 INFEC AGEN DETEC AMPLI PROBE: HCPCS

## 2021-10-14 PROCEDURE — 99214 OFFICE O/P EST MOD 30 MIN: CPT | Performed by: NURSE PRACTITIONER

## 2021-10-14 PROCEDURE — U0003 INFECTIOUS AGENT DETECTION BY NUCLEIC ACID (DNA OR RNA); SEVERE ACUTE RESPIRATORY SYNDROME CORONAVIRUS 2 (SARS-COV-2) (CORONAVIRUS DISEASE [COVID-19]), AMPLIFIED PROBE TECHNIQUE, MAKING USE OF HIGH THROUGHPUT TECHNOLOGIES AS DESCRIBED BY CMS-2020-01-R: HCPCS

## 2021-10-14 PROCEDURE — 71046 X-RAY EXAM CHEST 2 VIEWS: CPT | Mod: TC | Performed by: NURSE PRACTITIONER

## 2021-10-14 PROCEDURE — 87426 SARSCOV CORONAVIRUS AG IA: CPT | Performed by: NURSE PRACTITIONER

## 2021-10-14 RX ORDER — DOXYCYCLINE HYCLATE 100 MG
100 TABLET ORAL 2 TIMES DAILY
Qty: 14 TABLET | Refills: 0 | Status: SHIPPED | OUTPATIENT
Start: 2021-10-14 | End: 2021-10-21

## 2021-10-14 RX ORDER — ALBUTEROL SULFATE 90 UG/1
2 AEROSOL, METERED RESPIRATORY (INHALATION) EVERY 6 HOURS PRN
Qty: 8.5 G | Refills: 0 | Status: SHIPPED | OUTPATIENT
Start: 2021-10-14 | End: 2023-08-04

## 2021-10-14 RX ORDER — PREDNISONE 10 MG/1
TABLET ORAL
Qty: 15 TABLET | Refills: 0 | Status: SHIPPED
Start: 2021-10-14 | End: 2021-11-02

## 2021-10-14 ASSESSMENT — ENCOUNTER SYMPTOMS
CHILLS: 0
FEVER: 0
COUGH: 1

## 2021-10-14 ASSESSMENT — FIBROSIS 4 INDEX: FIB4 SCORE: 0.74

## 2021-10-14 NOTE — PROGRESS NOTES
Subjective     Reanna Ojeda is a 46 y.o. female who presents with Cough (2x weeks, chest feels heavy)    Past Medical History:   Diagnosis Date   • Autoimmune disease (HCC)     fibromyalgia   • Bipolar 2 disorder (HCC)     depression, anxiety   • Cervical pain (neck)     c4-c6     Social History     Socioeconomic History   • Marital status:      Spouse name: Not on file   • Number of children: Not on file   • Years of education: Not on file   • Highest education level: Not on file   Occupational History   • Not on file   Tobacco Use   • Smoking status: Former Smoker     Packs/day: 0.50     Years: 33.00     Pack years: 16.50     Types: Cigarettes     Quit date: 2019     Years since quittin.4   • Smokeless tobacco: Never Used   • Tobacco comment: Smoking cessation   Vaping Use   • Vaping Use: Never used   Substance and Sexual Activity   • Alcohol use: No     Comment: Sober x 22 years   • Drug use: No     Comment: Sober x 22 years   • Sexual activity: Yes     Partners: Male     Comment:    Other Topics Concern   • Not on file   Social History Narrative   • Not on file     Social Determinants of Health     Financial Resource Strain:    • Difficulty of Paying Living Expenses:    Food Insecurity:    • Worried About Running Out of Food in the Last Year:    • Ran Out of Food in the Last Year:    Transportation Needs:    • Lack of Transportation (Medical):    • Lack of Transportation (Non-Medical):    Physical Activity:    • Days of Exercise per Week:    • Minutes of Exercise per Session:    Stress:    • Feeling of Stress :    Social Connections:    • Frequency of Communication with Friends and Family:    • Frequency of Social Gatherings with Friends and Family:    • Attends Religion Services:    • Active Member of Clubs or Organizations:    • Attends Club or Organization Meetings:    • Marital Status:    Intimate Partner Violence:    • Fear of Current or Ex-Partner:    • Emotionally Abused:    •  "Physically Abused:    • Sexually Abused:      Family History   Problem Relation Age of Onset   • Hypertension Mother    • Hypertension Father    • Cancer Father    • No Known Problems Sister        Allergies: Patient has no known allergies.    Patient is a 46-year-old female who presents today with complaint of cough and shortness of breath.  She states cough started about 2 weeks ago.  She states initially she had a scratchy throat and states that she now feels heavy and congested in her chest.  No fever, aches, or chills.  She has been fully vaccinated against Covid.  Patient is currently a smoker and states she smokes about 1/2 pack/day.          Cough  This is a new problem. The current episode started 1 to 4 weeks ago. The problem has been unchanged. The problem occurs every few minutes. Pertinent negatives include no chills or fever.       Review of Systems   Constitutional: Positive for malaise/fatigue. Negative for chills and fever.   HENT: Negative.    Respiratory: Positive for cough.    Skin: Negative.    All other systems reviewed and are negative.             Objective     /74 (BP Location: Left arm, Patient Position: Sitting, BP Cuff Size: Adult)   Pulse 86   Temp 36.7 °C (98 °F) (Temporal)   Resp 14   Ht 1.676 m (5' 6\")   Wt 52.6 kg (116 lb)   SpO2 98%   BMI 18.72 kg/m²      Physical Exam  Vitals reviewed.   Constitutional:       Appearance: Normal appearance.   HENT:      Head: Normocephalic and atraumatic.      Right Ear: Tympanic membrane, ear canal and external ear normal.      Left Ear: Tympanic membrane, ear canal and external ear normal.      Nose: Nose normal.      Mouth/Throat:      Mouth: Mucous membranes are moist.   Eyes:      Extraocular Movements: Extraocular movements intact.      Conjunctiva/sclera: Conjunctivae normal.      Pupils: Pupils are equal, round, and reactive to light.   Cardiovascular:      Rate and Rhythm: Normal rate and regular rhythm.      Heart sounds: " Normal heart sounds.   Pulmonary:      Effort: Pulmonary effort is normal. No respiratory distress.      Breath sounds: Normal breath sounds. No stridor. No wheezing, rhonchi or rales.   Chest:      Chest wall: No tenderness.   Musculoskeletal:         General: Normal range of motion.      Cervical back: Normal range of motion and neck supple.   Skin:     General: Skin is warm.      Capillary Refill: Capillary refill takes less than 2 seconds.   Neurological:      Mental Status: She is alert and oriented to person, place, and time.   Psychiatric:         Mood and Affect: Mood normal.         Behavior: Behavior normal.         Thought Content: Thought content normal.         Judgment: Judgment normal.       Point-of-care Covid: Negative      Chest x-ray: Negative per radiologist                      Assessment & Plan      Cough  Bronchitis    Albuterol  Prednisone  Doxycycline  Push fluids  Strict ER precautions for respiratory distress

## 2021-10-15 DIAGNOSIS — R05.9 COUGH: ICD-10-CM

## 2021-10-15 DIAGNOSIS — R06.02 SOB (SHORTNESS OF BREATH): ICD-10-CM

## 2021-10-15 DIAGNOSIS — J40 BRONCHITIS: ICD-10-CM

## 2021-10-15 LAB
COVID ORDER STATUS COVID19: NORMAL
SARS-COV-2 RNA RESP QL NAA+PROBE: NOTDETECTED
SPECIMEN SOURCE: NORMAL

## 2021-10-26 ENCOUNTER — HOSPITAL ENCOUNTER (OUTPATIENT)
Dept: LAB | Facility: MEDICAL CENTER | Age: 46
End: 2021-10-26
Attending: PHYSICIAN ASSISTANT
Payer: COMMERCIAL

## 2021-10-26 DIAGNOSIS — Z13.6 SCREENING FOR CARDIOVASCULAR CONDITION: ICD-10-CM

## 2021-10-26 DIAGNOSIS — R53.83 FATIGUE, UNSPECIFIED TYPE: ICD-10-CM

## 2021-10-26 DIAGNOSIS — Z00.00 WELLNESS EXAMINATION: ICD-10-CM

## 2021-10-26 DIAGNOSIS — N95.1 PERIMENOPAUSE: ICD-10-CM

## 2021-10-26 LAB
25(OH)D3 SERPL-MCNC: 149 NG/ML (ref 30–100)
ALBUMIN SERPL BCP-MCNC: 4.6 G/DL (ref 3.2–4.9)
ALBUMIN/GLOB SERPL: 1.8 G/DL
ALP SERPL-CCNC: 58 U/L (ref 30–99)
ALT SERPL-CCNC: 17 U/L (ref 2–50)
ANION GAP SERPL CALC-SCNC: 10 MMOL/L (ref 7–16)
AST SERPL-CCNC: 20 U/L (ref 12–45)
BASOPHILS # BLD AUTO: 0.7 % (ref 0–1.8)
BASOPHILS # BLD: 0.06 K/UL (ref 0–0.12)
BILIRUB SERPL-MCNC: 0.5 MG/DL (ref 0.1–1.5)
BUN SERPL-MCNC: 15 MG/DL (ref 8–22)
CALCIUM SERPL-MCNC: 9.9 MG/DL (ref 8.5–10.5)
CHLORIDE SERPL-SCNC: 104 MMOL/L (ref 96–112)
CHOLEST SERPL-MCNC: 165 MG/DL (ref 100–199)
CO2 SERPL-SCNC: 25 MMOL/L (ref 20–33)
CREAT SERPL-MCNC: 0.89 MG/DL (ref 0.5–1.4)
EOSINOPHIL # BLD AUTO: 0.05 K/UL (ref 0–0.51)
EOSINOPHIL NFR BLD: 0.6 % (ref 0–6.9)
ERYTHROCYTE [DISTWIDTH] IN BLOOD BY AUTOMATED COUNT: 47.8 FL (ref 35.9–50)
FASTING STATUS PATIENT QL REPORTED: NORMAL
FOLATE SERPL-MCNC: 12.5 NG/ML
FSH SERPL-ACNC: 24.2 MIU/ML
GLOBULIN SER CALC-MCNC: 2.6 G/DL (ref 1.9–3.5)
GLUCOSE SERPL-MCNC: 89 MG/DL (ref 65–99)
HCT VFR BLD AUTO: 48 % (ref 37–47)
HDLC SERPL-MCNC: 61 MG/DL
HGB BLD-MCNC: 15.3 G/DL (ref 12–16)
IMM GRANULOCYTES # BLD AUTO: 0.04 K/UL (ref 0–0.11)
IMM GRANULOCYTES NFR BLD AUTO: 0.5 % (ref 0–0.9)
IRON SATN MFR SERPL: 74 % (ref 15–55)
IRON SERPL-MCNC: 216 UG/DL (ref 40–170)
LDLC SERPL CALC-MCNC: 87 MG/DL
LH SERPL-ACNC: 17.4 IU/L
LYMPHOCYTES # BLD AUTO: 1.29 K/UL (ref 1–4.8)
LYMPHOCYTES NFR BLD: 15 % (ref 22–41)
MCH RBC QN AUTO: 31.9 PG (ref 27–33)
MCHC RBC AUTO-ENTMCNC: 31.9 G/DL (ref 33.6–35)
MCV RBC AUTO: 100.2 FL (ref 81.4–97.8)
MONOCYTES # BLD AUTO: 0.58 K/UL (ref 0–0.85)
MONOCYTES NFR BLD AUTO: 6.7 % (ref 0–13.4)
NEUTROPHILS # BLD AUTO: 6.58 K/UL (ref 2–7.15)
NEUTROPHILS NFR BLD: 76.5 % (ref 44–72)
NRBC # BLD AUTO: 0 K/UL
NRBC BLD-RTO: 0 /100 WBC
PLATELET # BLD AUTO: 337 K/UL (ref 164–446)
PMV BLD AUTO: 10.3 FL (ref 9–12.9)
POTASSIUM SERPL-SCNC: 5 MMOL/L (ref 3.6–5.5)
PROT SERPL-MCNC: 7.2 G/DL (ref 6–8.2)
RBC # BLD AUTO: 4.79 M/UL (ref 4.2–5.4)
SODIUM SERPL-SCNC: 139 MMOL/L (ref 135–145)
T3 SERPL-MCNC: 118 NG/DL (ref 60–181)
T4 FREE SERPL-MCNC: 1.26 NG/DL (ref 0.93–1.7)
TIBC SERPL-MCNC: 293 UG/DL (ref 250–450)
TRIGL SERPL-MCNC: 87 MG/DL (ref 0–149)
TSH SERPL DL<=0.005 MIU/L-ACNC: 1.37 UIU/ML (ref 0.38–5.33)
UIBC SERPL-MCNC: 77 UG/DL (ref 110–370)
VIT B12 SERPL-MCNC: 576 PG/ML (ref 211–911)
WBC # BLD AUTO: 8.6 K/UL (ref 4.8–10.8)

## 2021-10-26 PROCEDURE — 83001 ASSAY OF GONADOTROPIN (FSH): CPT

## 2021-10-26 PROCEDURE — 83002 ASSAY OF GONADOTROPIN (LH): CPT

## 2021-10-26 PROCEDURE — 84480 ASSAY TRIIODOTHYRONINE (T3): CPT

## 2021-10-26 PROCEDURE — 82746 ASSAY OF FOLIC ACID SERUM: CPT

## 2021-10-26 PROCEDURE — 82607 VITAMIN B-12: CPT

## 2021-10-26 PROCEDURE — 80053 COMPREHEN METABOLIC PANEL: CPT

## 2021-10-26 PROCEDURE — 80061 LIPID PANEL: CPT

## 2021-10-26 PROCEDURE — 84443 ASSAY THYROID STIM HORMONE: CPT

## 2021-10-26 PROCEDURE — 82671 ASSAY OF ESTROGENS: CPT

## 2021-10-26 PROCEDURE — 83540 ASSAY OF IRON: CPT

## 2021-10-26 PROCEDURE — 36415 COLL VENOUS BLD VENIPUNCTURE: CPT

## 2021-10-26 PROCEDURE — 83550 IRON BINDING TEST: CPT

## 2021-10-26 PROCEDURE — 84439 ASSAY OF FREE THYROXINE: CPT

## 2021-10-26 PROCEDURE — 85025 COMPLETE CBC W/AUTO DIFF WBC: CPT

## 2021-10-26 PROCEDURE — 82306 VITAMIN D 25 HYDROXY: CPT

## 2021-10-30 LAB
ESTRADIOL SERPL HS-MCNC: 4.4 PG/ML
ESTRONE SERPL-MCNC: 11.3 PG/ML

## 2021-11-02 ENCOUNTER — TELEPHONE (OUTPATIENT)
Dept: MEDICAL GROUP | Facility: MEDICAL CENTER | Age: 46
End: 2021-11-02

## 2021-11-02 ENCOUNTER — HOSPITAL ENCOUNTER (OUTPATIENT)
Facility: MEDICAL CENTER | Age: 46
End: 2021-11-02
Attending: FAMILY MEDICINE
Payer: COMMERCIAL

## 2021-11-02 ENCOUNTER — OFFICE VISIT (OUTPATIENT)
Dept: MEDICAL GROUP | Facility: MEDICAL CENTER | Age: 46
End: 2021-11-02
Payer: COMMERCIAL

## 2021-11-02 VITALS
TEMPERATURE: 97.8 F | HEIGHT: 67 IN | WEIGHT: 114 LBS | SYSTOLIC BLOOD PRESSURE: 110 MMHG | RESPIRATION RATE: 16 BRPM | BODY MASS INDEX: 17.89 KG/M2 | DIASTOLIC BLOOD PRESSURE: 68 MMHG | HEART RATE: 80 BPM | OXYGEN SATURATION: 95 %

## 2021-11-02 DIAGNOSIS — Z00.00 PREVENTATIVE HEALTH CARE: ICD-10-CM

## 2021-11-02 DIAGNOSIS — R79.89 ABNORMAL CBC: ICD-10-CM

## 2021-11-02 DIAGNOSIS — Z30.41 ENCOUNTER FOR SURVEILLANCE OF CONTRACEPTIVE PILLS: ICD-10-CM

## 2021-11-02 DIAGNOSIS — R63.4 WEIGHT LOSS: ICD-10-CM

## 2021-11-02 DIAGNOSIS — Z12.11 SCREENING FOR COLON CANCER: ICD-10-CM

## 2021-11-02 DIAGNOSIS — Z00.00 ANNUAL PHYSICAL EXAM: ICD-10-CM

## 2021-11-02 PROCEDURE — 87624 HPV HI-RISK TYP POOLED RSLT: CPT

## 2021-11-02 PROCEDURE — 99396 PREV VISIT EST AGE 40-64: CPT | Performed by: FAMILY MEDICINE

## 2021-11-02 PROCEDURE — 88175 CYTOPATH C/V AUTO FLUID REDO: CPT

## 2021-11-02 RX ORDER — NORGESTREL AND ETHINYL ESTRADIOL 0.3-0.03MG
KIT ORAL
Qty: 84 TABLET | Refills: 3 | Status: SHIPPED | OUTPATIENT
Start: 2021-11-02 | End: 2022-09-26

## 2021-11-02 ASSESSMENT — FIBROSIS 4 INDEX: FIB4 SCORE: 0.68

## 2021-11-02 NOTE — TELEPHONE ENCOUNTER
----- Message from CHONG Rahman sent at 11/2/2021  2:44 PM PDT -----  Hi Dr. Garcia,    I reviewed your patient's labs with Dr. Sinclair.  He is the hematologist that would see the patient.  He mentioned that you would need to make certain that the patient did not take any vitamins that had iron in them the day of her labs because that can falsely increase iron levels.  If she did then you would need to repeat those iron levels without her taking those vitamins the day of.  He also recommends that you do a ferritin as well.  If those remain elevated then hematology referral would be warranted and Dr. Sinclair would be willing to see the patient.  He did state that we are scheduling new hematology patients out to January 2022.    Regards,  Viviane  ----- Message -----  From: Rosalva Garcia M.D.  Sent: 11/2/2021  11:56 AM PDT  To: CHONG Rahman    Wondering if [facility] can see my patient Reanna for high iron and unintentional wt loss   She is not drinking ETOH and she is not eating a lot of red meat so I cannot explain her high iron   She is open to cancer screening including colon cancer screening

## 2021-11-02 NOTE — PROGRESS NOTES
This medical record contains text that has been entered with the assistance of computer voice recognition and dictation software.  Therefore, it may contain unintended errors in text, spelling, punctuation, or grammar        Chief Complaint   Patient presents with   • Annual Exam     PAP   • Lab Results       Reanna Ojeda is a 46 y.o. female here evaluation and management of:    Review labs for unintentional wt loss   Well woman and pap       Current Outpatient Medications   Medication Sig Dispense Refill   • norgestrel-ethinyl estradiol (ELINEST) 0.3-30 MG-MCG Tab TAKE ONE TABLET BY MOUTH ONCE DAILY 84 Tablet 3   • albuterol 108 (90 Base) MCG/ACT Aero Soln inhalation aerosol Inhale 2 Puffs every 6 hours as needed for Shortness of Breath. 8.5 g 0   • sertraline (ZOLOFT) 25 MG tablet Take 25 mg by mouth every day.     • lamotrigine (LAMICTAL) 200 MG tablet Take 400 mg by mouth every evening.     • traZODone (DESYREL) 100 MG Tab Take 100 mg by mouth every evening.     • predniSONE (DELTASONE) 10 MG Tab Take 1 tablet 3 times daily for 5 days (Patient not taking: Reported on 11/2/2021) 15 Tablet 0     No current facility-administered medications for this visit.     Patient Active Problem List    Diagnosis Date Noted   • Weight loss 11/02/2021   • Lung nodule < 6cm on CT 02/03/2021   • Pain of upper abdomen 02/03/2021   • Spotting 02/05/2020   • Neck pain 01/06/2020   • Stress due to illness of family member 10/28/2019   • Neoplasm of uncertain behavior of skin 10/23/2019   • Coccydynia 10/23/2019   • Atypical pigmented skin lesion 10/09/2019   • Perimenopause 08/26/2019   • Annual physical exam 08/26/2019   • Encounter for surveillance of contraceptive pills 08/15/2018   • Primary insomnia 08/15/2018   • Bipolar 2 disorder (HCC) 08/15/2018   • Preventative health care 08/15/2018   • Breast lump in female 08/15/2018     Past Surgical History:   Procedure Laterality Date   • CERVICAL DISK AND FUSION ANTERIOR  5/22/2020  "   Procedure: DISCECTOMY, SPINE, CERVICAL, ANTERIOR APPROACH, WITH FUSION-FOR ANTERIOR C5-6 ARTHROPLASY;  Surgeon: Jonel Ojeda M.D.;  Location: SURGERY [facility];  Service: Neurosurgery   • LUMPECTOMY Left 2019   • BREAST BIOPSY Left 2018    Procedure: BREAST BIOPSY- FOR EXCISION OF BREAST MASS;  Surgeon: Shraddha Moss M.D.;  Location: SURGERY SAME DAY [facility];  Service: General      Social History     Tobacco Use   • Smoking status: Current Every Day Smoker     Packs/day: 0.50     Years: 33.00     Pack years: 16.50     Types: Cigarettes     Last attempt to quit: 2019     Years since quittin.5   • Smokeless tobacco: Never Used   • Tobacco comment: Smoking cessation   Vaping Use   • Vaping Use: Never used   Substance Use Topics   • Alcohol use: No     Comment: Sober x 22 years   • Drug use: No     Comment: Sober x 22 years     Family History   Problem Relation Age of Onset   • Hypertension Mother    • Hypertension Father    • Cancer Father    • No Known Problems Sister            ROS    all review of system completed and negative except for those listed above     Objective:     /68 (BP Location: Left arm, Patient Position: Sitting, BP Cuff Size: Adult)   Pulse 80   Temp 36.6 °C (97.8 °F) (Temporal)   Resp 16   Ht 1.689 m (5' 6.5\")   Wt 51.7 kg (114 lb)   SpO2 95%  Body mass index is 18.12 kg/m².  Physical Exam:    Constitutional: Alert, no distress.  Skin: Warm, dry, good turgor, no rashes in visible areas.  Eye: Equal, round and reactive, conjunctiva clear, lids normal.  ENMT: Lips without lesions, good dentition, oropharynx clear.  Neck: Trachea midline, no masses, no thyromegaly. No cervical or supraclavicular lymphadenopathy.  Respiratory: Unlabored respiratory effort, lungs clear to auscultation, no wheezes, no ronchi.  Cardiovascular: Normal S1, S2, no murmur, no edema.  Abdomen: Soft, non-tender, no masses, no hepatosplenomegaly.  Psych: Alert and oriented x3, " normal affect and mood.      PELVIC:  Normal external female genitalia.  Speculum: Vaginal hugo wnl no purulent discharge.  Cervix non friable.  Bimanual examination : Uterus size shape and consistency wnl.  No adnexal masses.  No Cervical motion tenderness           Assessment and Plan:   The following treatment plan was discussed        Problem List Items Addressed This Visit     Encounter for surveillance of contraceptive pills    Relevant Medications    norgestrel-ethinyl estradiol (ELINEST) 0.3-30 MG-MCG Tab    Preventative health care    Relevant Orders    THINPREP PAP WITH HPV    Annual physical exam     Pap smear today   Labs reviewed   Colon cancer screening ordered              Weight loss     Consult with hematology for this and for the high iron   135 in may -->114  Part of this was intentional but she also feels that this was faster than anticipated   She is up to date on cervical and breast cancer screening as of today   She is open to possible follow up ct scan in feb for lung nodule (optional)   She also will do colon cancer screening              Relevant Orders    CELIAC DISEASE AB PANEL      Other Visit Diagnoses     Screening for colon cancer        Relevant Orders    Referral to GI for Colonoscopy    Abnormal CBC        Relevant Orders    Referral to Hematology Oncology        Age appropriate prev health care discussed   Cancer screening discussed   Vaccines discussed   Labs offered   Blood pressure at goal     Anticipatory guidance including SPF and other skin protective measures, dental hygiene and care, regular exercise, diet, stress and family planning advice discussed.            Instructed to follow up if symptoms worsen or fail to improve, ER/UC precautions discussed as well    Rosalva Garcia MD  [facility], Family Medicine   [address] [place], [place] [address]   Harry QUAN [id number]  Phone: [phone]

## 2021-11-02 NOTE — ASSESSMENT & PLAN NOTE
Consult with hematology for this and for the high iron   135 in may -->114  Part of this was intentional but she also feels that this was faster than anticipated   She is up to date on cervical and breast cancer screening as of today   She is open to possible follow up ct scan in feb for lung nodule (optional)   She also will do colon cancer screening

## 2021-11-03 ENCOUNTER — HOSPITAL ENCOUNTER (OUTPATIENT)
Dept: LAB | Facility: MEDICAL CENTER | Age: 46
End: 2021-11-03
Attending: FAMILY MEDICINE
Payer: COMMERCIAL

## 2021-11-03 DIAGNOSIS — Z00.00 PREVENTATIVE HEALTH CARE: ICD-10-CM

## 2021-11-03 DIAGNOSIS — R63.4 WEIGHT LOSS: ICD-10-CM

## 2021-11-03 DIAGNOSIS — R79.89 ABNORMAL CBC: ICD-10-CM

## 2021-11-03 LAB — FERRITIN SERPL-MCNC: 93.6 NG/ML (ref 10–291)

## 2021-11-03 PROCEDURE — 82784 ASSAY IGA/IGD/IGG/IGM EACH: CPT

## 2021-11-03 PROCEDURE — 36415 COLL VENOUS BLD VENIPUNCTURE: CPT

## 2021-11-03 PROCEDURE — 83516 IMMUNOASSAY NONANTIBODY: CPT

## 2021-11-03 PROCEDURE — 82728 ASSAY OF FERRITIN: CPT

## 2021-11-04 LAB
CYTOLOGY REG CYTOL: NORMAL
HPV HR 12 DNA CVX QL NAA+PROBE: NEGATIVE
HPV16 DNA SPEC QL NAA+PROBE: NEGATIVE
HPV18 DNA SPEC QL NAA+PROBE: NEGATIVE
SPECIMEN SOURCE: NORMAL

## 2021-11-05 LAB — IGA SERPL-MCNC: 100 MG/DL (ref 68–408)

## 2021-11-06 LAB — TTG IGA SER IA-ACNC: <2 U/ML (ref 0–3)

## 2021-11-10 ENCOUNTER — TELEPHONE (OUTPATIENT)
Dept: MEDICAL GROUP | Facility: MEDICAL CENTER | Age: 46
End: 2021-11-10

## 2021-11-10 NOTE — TELEPHONE ENCOUNTER
----- Message from Rosalva Garcia M.D. sent at 11/9/2021  5:09 PM PST -----  Neg normal pap   Next one in 5 year

## 2021-11-10 NOTE — TELEPHONE ENCOUNTER
Phone Number Called: [phone] (home)     Call outcome: Did not leave a detailed message. Requested patient to call back.    Message: LVM for pt. To call back for lab results.

## 2021-12-21 NOTE — PROGRESS NOTES
01/04/22    Subjective    Chief Complaint:  Abnormal CBC    HPI:  46 female referred by Dr. Madeleine Garcia for consultation for a CBC that reveals mild macrocytosis and a reduced mean corpuscular hemoglobin concentration. B12 and folate were normal. Iron is elevated but ferritin within normal limits.     ROS:    Constitutional: No weight loss  Skin: No rash or jaundice  HENT: No change in eyesight or hearing  Cardiovascular:No chest pain or arrythmia  Respiratory:No cough or SOB  GI:No nausea, vomiting, diarrhea, constipation  :No dysuria or frequency  Musculoskeletal:No bone or joint pain  Neuro:No sx's of neuropathy  Psych: No complaints    PMH:      No Known Allergies    Past Medical History:   Diagnosis Date   • Autoimmune disease (HCC)     fibromyalgia   • Bipolar 2 disorder (HCC)     depression, anxiety   • Cervical pain (neck)     c4-c6        Past Surgical History:   Procedure Laterality Date   • CERVICAL DISK AND FUSION ANTERIOR  5/22/2020    Procedure: DISCECTOMY, SPINE, CERVICAL, ANTERIOR APPROACH, WITH FUSION-FOR ANTERIOR C5-6 ARTHROPLASY;  Surgeon: Jonel Ojeda M.D.;  Location: SURGERY [facility];  Service: Neurosurgery   • LUMPECTOMY Left 09/18/2019   • BREAST BIOPSY Left 9/28/2018    Procedure: BREAST BIOPSY- FOR EXCISION OF BREAST MASS;  Surgeon: Shraddha Moss M.D.;  Location: SURGERY SAME DAY [facility];  Service: General        Medications:    Current Outpatient Medications on File Prior to Visit   Medication Sig Dispense Refill   • norgestrel-ethinyl estradiol (ELINEST) 0.3-30 MG-MCG Tab TAKE ONE TABLET BY MOUTH ONCE DAILY 84 Tablet 3   • albuterol 108 (90 Base) MCG/ACT Aero Soln inhalation aerosol Inhale 2 Puffs every 6 hours as needed for Shortness of Breath. 8.5 g 0   • sertraline (ZOLOFT) 25 MG tablet Take 25 mg by mouth every day.     • lamotrigine (LAMICTAL) 200 MG tablet Take 400 mg by mouth every evening.     • traZODone (DESYREL) 100 MG Tab Take 100 mg by mouth every  evening.       No current facility-administered medications on file prior to visit.       Social History     Tobacco Use   • Smoking status: Current Every Day Smoker     Packs/day: 0.50     Years: 33.00     Pack years: 16.50     Types: Cigarettes     Last attempt to quit: 2019     Years since quittin.6   • Smokeless tobacco: Never Used   • Tobacco comment: Smoking cessation   Substance Use Topics   • Alcohol use: No     Comment: Sober x 22 years        Family History   Problem Relation Age of Onset   • Hypertension Mother    • Hypertension Father    • Cancer Father    • No Known Problems Sister         Objective    Vitals:    There were no vitals taken for this visit.    Physical Exam:    Appears well-developed and well-nourished. No distress.    Head -  Normocephalic .   Eyes - Pupils are equal, round, and reactive to light. Conjunctivae and EOM are normal. No scleral icterus.   Ears - normal hearing  Mouth - Throat: Oropharynx is clear and moist. No oropharyngeal exudate  Neck - Normal range of motion. Neck supple. No thyromegaly  Cardiovascular - Normal rate, regular rhythm, normal heart sounds and intact distal pulses. No  gallop, murmur or rub  Pulmonary - Normal breath sounds.  No wheeze, rales or rhonci  Breast - symmetrical. No mass on indentation.  Abdominal -Soft. No distension, tenderness, organomegaly or mass  Extremities-  No edema or tenderness.    Nodes - No submental, submandibular, preauricular, cervical, axillary or inguinal adenopathy.    Neurological -   Alert and oriented to person, place, and time. No focal findings  Skin - Skin is warm and dry. No rash noted. Not diaphoretic. No erythema. No pallor. No jaundice   Psychiatric -  Normal mood and affect.    Labs:  Results for JANET STERLING (MRN [id number])    Ref. Range 2020 08:46 2021 21:30 10/26/2021 09:12   WBC Latest Ref Range: 4.8 - 10.8 K/uL 5.0 13.3 (H) 8.6   RBC Latest Ref Range: 4.20 - 5.40 M/uL 4.41 4.31 4.79    Hemoglobin Latest Ref Range: 12.0 - 16.0 g/dL 13.7 13.7 15.3   Hematocrit Latest Ref Range: 37.0 - 47.0 % 43.7 41.9 48.0 (H)   MCV Latest Ref Range: 81.4 - 97.8 fL 99.1 (H) 97.2 100.2 (H)   MCH Latest Ref Range: 27.0 - 33.0 pg 31.1 31.8 31.9   MCHC Latest Ref Range: 33.6 - 35.0 g/dL 31.4 (L) 32.7 (L) 31.9 (L)   RDW Latest Ref Range: 35.9 - 50.0 fL 43.8 45.1 47.8   Platelet Count Latest Ref Range: 164 - 446 K/uL 279 317 337   MPV Latest Ref Range: 9.0 - 12.9 fL 10.3 9.9 10.3   Neutrophils-Polys Latest Ref Range: 44.00 - 72.00 % 67.40 77.30 (H) 76.50 (H)   Neutrophils (Absolute) Latest Ref Range: 2.00 - 7.15 K/uL 3.35 10.31 (H) 6.58   Lymphocytes Latest Ref Range: 22.00 - 41.00 % 20.10 (L) 15.10 (L) 15.00 (L)   Results for JANET STERLING YANET (MRN [id number])    Ref. Range 10/26/2021 09:12   Iron Latest Ref Range: 40 - 170 ug/dL 216 (H)   Total Iron Binding Latest Ref Range: 250 - 450 ug/dL 293   % Saturation Latest Ref Range: 15 - 55 % 74 (H)   Results for JANET STERLING YANET (MRN [id number])   Ref. Range 11/3/2021 11:15   Ferritin Latest Ref Range: 10.0 - 291.0 ng/mL 93.6       Assessment    Imp:    Visit Diagnosis:    1. Macrocytosis           Plan:      Avery Sinclair M.D.

## 2022-01-04 ENCOUNTER — HOSPITAL ENCOUNTER (OUTPATIENT)
Dept: LAB | Facility: MEDICAL CENTER | Age: 47
End: 2022-01-04
Attending: STUDENT IN AN ORGANIZED HEALTH CARE EDUCATION/TRAINING PROGRAM
Payer: COMMERCIAL

## 2022-01-04 ENCOUNTER — OFFICE VISIT (OUTPATIENT)
Dept: HEMATOLOGY ONCOLOGY | Facility: MEDICAL CENTER | Age: 47
End: 2022-01-04
Payer: COMMERCIAL

## 2022-01-04 VITALS
TEMPERATURE: 98.7 F | DIASTOLIC BLOOD PRESSURE: 60 MMHG | SYSTOLIC BLOOD PRESSURE: 104 MMHG | HEIGHT: 66 IN | WEIGHT: 114.2 LBS | OXYGEN SATURATION: 100 % | RESPIRATION RATE: 16 BRPM | BODY MASS INDEX: 18.35 KG/M2 | HEART RATE: 99 BPM

## 2022-01-04 DIAGNOSIS — K59.00 CONSTIPATION, UNSPECIFIED CONSTIPATION TYPE: ICD-10-CM

## 2022-01-04 DIAGNOSIS — E83.19 IRON OVERLOAD: ICD-10-CM

## 2022-01-04 DIAGNOSIS — D75.89 MACROCYTOSIS: ICD-10-CM

## 2022-01-04 DIAGNOSIS — R63.4 UNEXPLAINED WEIGHT LOSS: ICD-10-CM

## 2022-01-04 LAB
ALBUMIN SERPL BCP-MCNC: 4.5 G/DL (ref 3.2–4.9)
ALBUMIN/GLOB SERPL: 1.7 G/DL
ALP SERPL-CCNC: 54 U/L (ref 30–99)
ALT SERPL-CCNC: 14 U/L (ref 2–50)
ANION GAP SERPL CALC-SCNC: 12 MMOL/L (ref 7–16)
AST SERPL-CCNC: 14 U/L (ref 12–45)
BASOPHILS # BLD AUTO: 0.6 % (ref 0–1.8)
BASOPHILS # BLD: 0.05 K/UL (ref 0–0.12)
BILIRUB SERPL-MCNC: 0.2 MG/DL (ref 0.1–1.5)
BUN SERPL-MCNC: 14 MG/DL (ref 8–22)
CALCIUM SERPL-MCNC: 9.7 MG/DL (ref 8.5–10.5)
CHLORIDE SERPL-SCNC: 103 MMOL/L (ref 96–112)
CO2 SERPL-SCNC: 24 MMOL/L (ref 20–33)
CREAT SERPL-MCNC: 0.73 MG/DL (ref 0.5–1.4)
EOSINOPHIL # BLD AUTO: 0.03 K/UL (ref 0–0.51)
EOSINOPHIL NFR BLD: 0.3 % (ref 0–6.9)
ERYTHROCYTE [DISTWIDTH] IN BLOOD BY AUTOMATED COUNT: 46 FL (ref 35.9–50)
FERRITIN SERPL-MCNC: 68.6 NG/ML (ref 10–291)
GLOBULIN SER CALC-MCNC: 2.7 G/DL (ref 1.9–3.5)
GLUCOSE SERPL-MCNC: 103 MG/DL (ref 65–99)
HCT VFR BLD AUTO: 44.7 % (ref 37–47)
HGB BLD-MCNC: 14.6 G/DL (ref 12–16)
HGB RETIC QN AUTO: 35.4 PG/CELL (ref 29–35)
IMM GRANULOCYTES # BLD AUTO: 0.02 K/UL (ref 0–0.11)
IMM GRANULOCYTES NFR BLD AUTO: 0.2 % (ref 0–0.9)
IMM RETICS NFR: 5.7 % (ref 9.3–17.4)
IRON SATN MFR SERPL: 43 % (ref 15–55)
IRON SERPL-MCNC: 127 UG/DL (ref 40–170)
LDH SERPL L TO P-CCNC: 162 U/L (ref 107–266)
LYMPHOCYTES # BLD AUTO: 1.66 K/UL (ref 1–4.8)
LYMPHOCYTES NFR BLD: 19.3 % (ref 22–41)
MCH RBC QN AUTO: 32.5 PG (ref 27–33)
MCHC RBC AUTO-ENTMCNC: 32.7 G/DL (ref 33.6–35)
MCV RBC AUTO: 99.6 FL (ref 81.4–97.8)
MONOCYTES # BLD AUTO: 0.61 K/UL (ref 0–0.85)
MONOCYTES NFR BLD AUTO: 7.1 % (ref 0–13.4)
NEUTROPHILS # BLD AUTO: 6.21 K/UL (ref 2–7.15)
NEUTROPHILS NFR BLD: 72.5 % (ref 44–72)
NRBC # BLD AUTO: 0 K/UL
NRBC BLD-RTO: 0 /100 WBC
PLATELET # BLD AUTO: 334 K/UL (ref 164–446)
PMV BLD AUTO: 11.3 FL (ref 9–12.9)
POTASSIUM SERPL-SCNC: 4.3 MMOL/L (ref 3.6–5.5)
PROT SERPL-MCNC: 7.2 G/DL (ref 6–8.2)
RBC # BLD AUTO: 4.49 M/UL (ref 4.2–5.4)
RETICS # AUTO: 0.04 M/UL (ref 0.04–0.06)
RETICS/RBC NFR: 0.9 % (ref 0.8–2.1)
SODIUM SERPL-SCNC: 139 MMOL/L (ref 135–145)
TIBC SERPL-MCNC: 295 UG/DL (ref 250–450)
UIBC SERPL-MCNC: 168 UG/DL (ref 110–370)
WBC # BLD AUTO: 8.6 K/UL (ref 4.8–10.8)

## 2022-01-04 PROCEDURE — 82668 ASSAY OF ERYTHROPOIETIN: CPT

## 2022-01-04 PROCEDURE — 83615 LACTATE (LD) (LDH) ENZYME: CPT

## 2022-01-04 PROCEDURE — 80053 COMPREHEN METABOLIC PANEL: CPT

## 2022-01-04 PROCEDURE — 36415 COLL VENOUS BLD VENIPUNCTURE: CPT

## 2022-01-04 PROCEDURE — 85025 COMPLETE CBC W/AUTO DIFF WBC: CPT

## 2022-01-04 PROCEDURE — 82728 ASSAY OF FERRITIN: CPT

## 2022-01-04 PROCEDURE — 83540 ASSAY OF IRON: CPT

## 2022-01-04 PROCEDURE — 81256 HFE GENE: CPT

## 2022-01-04 PROCEDURE — 85046 RETICYTE/HGB CONCENTRATE: CPT

## 2022-01-04 PROCEDURE — 99204 OFFICE O/P NEW MOD 45 MIN: CPT | Performed by: STUDENT IN AN ORGANIZED HEALTH CARE EDUCATION/TRAINING PROGRAM

## 2022-01-04 PROCEDURE — 83550 IRON BINDING TEST: CPT

## 2022-01-04 ASSESSMENT — ENCOUNTER SYMPTOMS
BACK PAIN: 0
TINGLING: 0
WEIGHT LOSS: 1
FEVER: 0
NERVOUS/ANXIOUS: 0
VOMITING: 0
DIAPHORESIS: 0
ABDOMINAL PAIN: 1
MYALGIAS: 0
HEARTBURN: 0
SINUS PAIN: 0
HEADACHES: 0
PALPITATIONS: 0
SPUTUM PRODUCTION: 0
BRUISES/BLEEDS EASILY: 0
BLOOD IN STOOL: 0
DIARRHEA: 0
SORE THROAT: 0
DIZZINESS: 0
CHILLS: 0
WHEEZING: 0
INSOMNIA: 0
SHORTNESS OF BREATH: 0
COUGH: 0
ORTHOPNEA: 0
NAUSEA: 0
BLURRED VISION: 0
WEAKNESS: 0
CONSTIPATION: 1
DOUBLE VISION: 0

## 2022-01-04 ASSESSMENT — FIBROSIS 4 INDEX: FIB4 SCORE: 0.68

## 2022-01-04 NOTE — PROGRESS NOTES
Consult:  Hematology/Oncology      Referring Physician: PCP  Primary Care:  Rosalva Garcia M.D.    Diagnosis: Iron overload    Chief Complaint:  Abdominal pain, abnormal labs, unexplained weight loss    History of Presenting Illness:  Reanna Ojeda is a 46 y.o.  woman who presents to the clinic today for evaluation for unexplained weight loss, abdominal pain, and abnormal labs including signs of iron overload.  She also has a macrocytosis on her laboratory studies, without anemia.    She has been dealing with this abdominal pain for around a year, as it it started in December 2020.  She originally went to the emergency department where she was discharged without any significant answers, and then was referred to gastroenterology where she was treated for constipation and put on bowel regimen.  However, her symptoms have persisted and she has lost around 25 pounds.  She was going to the gym for toning, but she did not have any plans for weight loss and has not made any dietary alterations.  She is even stopped working out for the last month because of her concern about her weight loss.  She continues to have abdominal pain and is on a bowel regimen consisting of MiraLAX to help her move her bowels.  She has never had any previous problems with moving her bowels prior to these episodes starting last year.    She had laboratory studies done, most recently in October, which revealed an iron saturation of 74% with a serum iron of 216 and a TIBC of 293.  Her ferritin was found to be 93.  She was also noted to have a mild macrocytosis with an MCV of 100.2, and her hematocrit is mildly elevated at 48%.  She has no other significant abnormalities on her laboratory studies.    She has been maintained on medications including lamotrigine, trazodone, sertraline, and birth control.  She takes an albuterol inhaler as needed.  She has not had any changes to her medications over this period of time.    She has been  referred here for further evaluation of these laboratory abnormalities.    Interval History:  Patient is here for consultation.  She states that she feels okay other than the intermittent abdominal pain and the anxiety regarding her weight loss.  She states that her and her  do not eat any red meat and do not have a high protein diet, sticking with chicken because of his history of renal cancer and his need for a low protein diet to help with his renal function.  She has not been taking any vitamins or supplements, and has not started any new medications.  She does have fibromyalgia, but she manages this without any specific medications.    Past Medical History:   Diagnosis Date   • Autoimmune disease (HCC)     fibromyalgia   • Bipolar 2 disorder (HCC)     depression, anxiety   • Cervical pain (neck)     c4-c6   • Fibromyalgia        Past Surgical History:   Procedure Laterality Date   • CERVICAL DISK AND FUSION ANTERIOR  2020    Procedure: DISCECTOMY, SPINE, CERVICAL, ANTERIOR APPROACH, WITH FUSION-FOR ANTERIOR C5-6 ARTHROPLASY;  Surgeon: Jonel Ojeda M.D.;  Location: SURGERY [facility];  Service: Neurosurgery   • LUMPECTOMY Left 2019   • BREAST BIOPSY Left 2018    Procedure: BREAST BIOPSY- FOR EXCISION OF BREAST MASS;  Surgeon: Shraddha Moss M.D.;  Location: SURGERY SAME DAY [facility];  Service: General       Social History     Socioeconomic History   • Marital status:      Spouse name: Not on file   • Number of children: Not on file   • Years of education: Not on file   • Highest education level: Not on file   Occupational History   • Not on file   Tobacco Use   • Smoking status: Current Every Day Smoker     Packs/day: 0.50     Years: 33.00     Pack years: 16.50     Types: Cigarettes     Last attempt to quit: 2019     Years since quittin.7   • Smokeless tobacco: Never Used   • Tobacco comment: Smoking cessation   Vaping Use   • Vaping Use: Never used    Substance and Sexual Activity   • Alcohol use: No     Comment: Sober x 25 years   • Drug use: No     Comment: Sober x 25 years. Previously used speed.    • Sexual activity: Yes     Partners: Male     Comment:    Other Topics Concern   • Not on file   Social History Narrative    She has a 25 year old son. She is  and lives here in town. 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 Last period was 3 weeks ago, and has been getting much lighter.      Social Determinants of Health     Financial Resource Strain:    • Difficulty of Paying Living Expenses: Not on file   Food Insecurity:    • Worried About Running Out of Food in the Last Year: Not on file   • Ran Out of Food in the Last Year: Not on file   Transportation Needs:    • Lack of Transportation (Medical): Not on file   • Lack of Transportation (Non-Medical): Not on file   Physical Activity:    • Days of Exercise per Week: Not on file   • Minutes of Exercise per Session: Not on file   Stress:    • Feeling of Stress : Not on file   Social Connections:    • Frequency of Communication with Friends and Family: Not on file   • Frequency of Social Gatherings with Friends and Family: Not on file   • Attends Congregation Services: Not on file   • Active Member of Clubs or Organizations: Not on file   • Attends Club or Organization Meetings: Not on file   • Marital Status: Not on file   Intimate Partner Violence:    • Fear of Current or Ex-Partner: Not on file   • Emotionally Abused: Not on file   • Physically Abused: Not on file   • Sexually Abused: Not on file   Housing Stability:    • Unable to Pay for Housing in the Last Year: Not on file   • Number of Places Lived in the Last Year: Not on file   • Unstable Housing in the Last Year: Not on file       Family History   Problem Relation Age of Onset   • Hypertension Mother    • Hypertension Father    • Cancer Father         Lung   • No Known Problems Sister        OB  "History   No obstetric history on file.       Allergies as of 01/04/2022   • (No Known Allergies)         Current Outpatient Medications:   •  norgestrel-ethinyl estradiol (ELINEST) 0.3-30 MG-MCG Tab, TAKE ONE TABLET BY MOUTH ONCE DAILY, Disp: 84 Tablet, Rfl: 3  •  albuterol 108 (90 Base) MCG/ACT Aero Soln inhalation aerosol, Inhale 2 Puffs every 6 hours as needed for Shortness of Breath., Disp: 8.5 g, Rfl: 0  •  sertraline (ZOLOFT) 25 MG tablet, Take 25 mg by mouth every day., Disp: , Rfl:   •  lamotrigine (LAMICTAL) 200 MG tablet, Take 400 mg by mouth every evening., Disp: , Rfl:   •  traZODone (DESYREL) 100 MG Tab, Take 100 mg by mouth every evening., Disp: , Rfl:     Review of Systems:  Review of Systems   Constitutional: Positive for weight loss (25 lbs. over past year). Negative for chills, diaphoresis, fever and malaise/fatigue.   HENT: Negative for hearing loss, nosebleeds, sinus pain and sore throat.    Eyes: Negative for blurred vision and double vision.   Respiratory: Negative for cough, sputum production, shortness of breath and wheezing.    Cardiovascular: Negative for chest pain, palpitations, orthopnea and leg swelling.   Gastrointestinal: Positive for abdominal pain (R sided in the liver/GB area) and constipation. Negative for blood in stool, diarrhea, heartburn, melena, nausea and vomiting.   Genitourinary: Negative for dysuria, frequency, hematuria and urgency.   Musculoskeletal: Negative for back pain, joint pain and myalgias.   Skin: Negative for rash.   Neurological: Negative for dizziness, tingling, weakness and headaches.   Endo/Heme/Allergies: Does not bruise/bleed easily.   Psychiatric/Behavioral: The patient is not nervous/anxious and does not have insomnia.           Physical Exam:  Vitals:    01/04/22 1137   BP: 104/60   Pulse: 99   Resp: 16   Temp: 37.1 °C (98.7 °F)   TempSrc: Temporal   SpO2: 100%   Weight: 51.8 kg (114 lb 3.2 oz)   Height: 1.689 m (5' 6.5\")       DESC; KARNOFSKY SCALE " WITH ECOG EQUIVALENT: 100, Fully active, able to carry on all pre-disease performed without restriction (ECOG equivalent 0)    DISTRESS LEVEL: no apparent distress    Physical Exam  Vitals and nursing note reviewed.   Constitutional:       General: She is awake. She is not in acute distress.     Appearance: Normal appearance. She is normal weight. She is not ill-appearing, toxic-appearing or diaphoretic.   HENT:      Head: Normocephalic and atraumatic.      Nose: Nose normal. No congestion.      Mouth/Throat:      Pharynx: Oropharynx is clear. No oropharyngeal exudate or posterior oropharyngeal erythema.   Eyes:      General: No scleral icterus.     Extraocular Movements: Extraocular movements intact.      Conjunctiva/sclera: Conjunctivae normal.      Pupils: Pupils are equal, round, and reactive to light.   Cardiovascular:      Rate and Rhythm: Normal rate and regular rhythm.      Pulses: Normal pulses.      Heart sounds: Normal heart sounds. No murmur heard.  No friction rub. No gallop.    Pulmonary:      Effort: Pulmonary effort is normal.      Breath sounds: Normal breath sounds. No decreased air movement. No wheezing, rhonchi or rales.   Chest:   Breasts:      Right: No axillary adenopathy.      Left: No axillary adenopathy.       Abdominal:      General: Bowel sounds are normal. There is no distension.      Tenderness: There is abdominal tenderness in the right upper quadrant.   Musculoskeletal:         General: No deformity. Normal range of motion.      Cervical back: Normal range of motion and neck supple. No tenderness.      Right lower leg: No edema.      Left lower leg: No edema.   Lymphadenopathy:      Cervical: Cervical adenopathy present.      Right cervical: Superficial cervical adenopathy (Mildly tender, small (subcentimeter)) present.      Upper Body:      Right upper body: No axillary adenopathy.      Left upper body: No axillary adenopathy.      Lower Body: No right inguinal adenopathy. No left  inguinal adenopathy.   Skin:     General: Skin is warm and dry.      Coloration: Skin is not jaundiced.      Findings: No erythema or rash.   Neurological:      General: No focal deficit present.      Mental Status: She is alert and oriented to person, place, and time.      Cranial Nerves: Cranial nerves are intact.      Sensory: Sensation is intact.      Motor: Motor function is intact. No weakness.      Gait: Gait is intact.   Psychiatric:         Attention and Perception: Attention normal.         Mood and Affect: Mood normal.         Behavior: Behavior normal. Behavior is cooperative.         Thought Content: Thought content normal.         Judgment: Judgment normal.          Depression Screening    Little interest or pleasure in doing things?      Feeling down, depressed , or hopeless?     Trouble falling or staying asleep, or sleeping too much?      Feeling tired or having little energy?      Poor appetite or overeating?      Feeling bad about yourself - or that you are a failure or have let yourself or your family down?     Trouble concentrating on things, such as reading the newspaper or watching television?     Moving or speaking so slowly that other people could have noticed.  Or the opposite - being so fidgety or restless that you have been moving around a lot more than usual?      Thoughts that you would be better off dead, or of hurting yourself?      Patient Health Questionnaire Score:         If depressive symptoms identified deferred to follow up visit unless specifically addressed in assesment and plan.    Interpretation of PHQ-9 Total Score   Score Severity   1-4 No Depression   5-9 Mild Depression   10-14 Moderate Depression   15-19 Moderately Severe Depression   20-27 Severe Depression    Labs:  No visits with results within 7 Day(s) from this visit.   Latest known visit with results is:   Hospital Outpatient Visit on 11/03/2021   Component Date Value Ref Range Status   • Ferritin 11/03/2021 93.6   10.0 - 291.0 ng/mL Final   • Immunoglobulin A 11/03/2021 100  68 - 408 mg/dL Final    Comment: Total IgA is within or higher than established ranges. Tissue  Transglutaminase, IgA to follow.  REFERENCE INTERVAL: Immunoglobulin A  Access complete set of age- and/or gender-specific reference  intervals for this test in the [facility] Test Directory  ([email or web address]).  Performed By: [facility] [employer]  [address] [place] [id number]  : Alessia Anthony MD     • t-TG IgA 11/03/2021 <2  0 - 3 U/mL Final    Comment: No further celiac testing to be performed.  INTERPRETIVE INFORMATION: Tissue Transglutaminase (tTG) Antibody,  IgA  3 U/mL or less: Negative  4-10 U/mL: Weak Positive  11 U/mL or greater: Positive  Presence of the tissue transglutaminase (tTG) IgA antibody is  associated with glutensensitive enteropathies such as celiac  disease and dermatitis herpetiformis. tTG IgA antibody  concentrations greater than 40 U/mL usually correlate with results  of duodenal biopsies consistent with a diagnosis of celiac  disease. For antibody concentrations greater or equal to 4 U/mL  but less than or equal to 40 U/mL, additional testing for  endomysial (EMA) IgA concentrations may improve the positive  predictive value for disease.  Performed By: [facility]  [address] [place], [place] [id number]  : Alessia Anthony MD         Imaging:   All listed images below have been independently reviewed by me. I agree with the findings as summarized below:    CT abd/pel 02/2021:    IMPRESSION:        No acute abnormality.     Small uterine fibroid.     4 mm nonspecific noncalcified right lower lobe pulmonary nodule. Follow-up per Fleischner criteria as clinically indicated.        Low Risk: No routine follow-up     High Risk: Optional CT at 12 months     Comments: Nodules less than 6 mm do not require routine follow-up, but certain patients at high risk with suspicious nodule  morphology, upper lobe location, or both may warrant 12-month follow-up.     Low Risk - Minimal or absent history of smoking and of other known risk factors.     High Risk - History of smoking or of other known risk factors.     Note: These recommendations do not apply to lung cancer screening, patients with immunosuppression, or patients with known primary cancer.     Fleischner Society 2017 Guidelines for Management of Incidentally Detected Pulmonary Nodules in Adults    Pathology:  NA    Assessment & Plan:  1. Iron overload  MR-ABDOMEN-WITH & W/O    CBC WITH DIFFERENTIAL    Comp Metabolic Panel    FERRITIN    IRON/TOTAL IRON BIND    ERYTHROPOIETIN    Hemochromatosis Genotype   2. Macrocytosis  RETICULOCYTES COUNT    LDH    MR-ABDOMEN-WITH & W/O    CBC WITH DIFFERENTIAL    Comp Metabolic Panel    FERRITIN    IRON/TOTAL IRON BIND    ERYTHROPOIETIN    Hemochromatosis Genotype   3. Constipation, unspecified constipation type  MR-ABDOMEN-WITH & W/O    CBC WITH DIFFERENTIAL    Comp Metabolic Panel    FERRITIN    IRON/TOTAL IRON BIND    ERYTHROPOIETIN    Hemochromatosis Genotype   4. Unexplained weight loss  MR-ABDOMEN-WITH & W/O    CBC WITH DIFFERENTIAL    Comp Metabolic Panel    FERRITIN    IRON/TOTAL IRON BIND    ERYTHROPOIETIN    Hemochromatosis Genotype       This is a 46-year-old  woman with laboratory studies concerning for iron overload, with an iron saturation of 74%, despite a normal ferritin.  She has no notable family history of hereditary hemochromatosis, but does have pain in her right upper quadrant and has had significant weight loss to go along with this unexplained iron overload.  Therefore, we will repeat studies including CBC with differential, CMP, ferritin, and iron profile, and will additionally check an LDH, reticulocyte count, hemochromatosis panel, and serum erythropoietin levels.  I have also ordered an MRI of the liver to assess her hepatic iron concentration.    Additionally, her  issues with constipation and 25 pound weight loss are concerning for a potential GI malignancy such as colon cancer.  She is of age for a screening colonoscopy, and given the symptoms I would recommend that she undergo urgent colonoscopy for evaluation.  It is noted however that any sort of potential colon cancer may be associated with iron deficiency anemia, and the patient has iron overload.    Current Diagnosis and Staging: Iron overload    Treatment Plan: Will undergo further testing including labs and MRI liver. Recommend colonoscopy to evaluate for reason behind new-onset constipation, RUQ pain, and unexplained weight loss.     Treatment Citation: NA    Plan of Care:    1. Primary Therapy: NA  2. Supportive Therapy: NA  3. Labs: CBC with diff, CMP, ferritin, iron panel, serum EPO, LDH, reticulocyte count, hemochromatosis testing  4. Imaging: MRI liver to assess hepatic iron concentration  5. Treatment Planning: NA  6. Consultations: Refer back to GI for colonoscopy  7. Code Status: Full  8. Miscellaneous: NA  9. Return for Follow Up: Based on results of studies    Any questions and concerns raised by the patient were answered to the best of my ability. Thank you for allowing me to participate in the care for this patient. Please feel free to contact me for any questions or concerns.     Total time spent on chart review, clinic encounter, and documentation: 44 minutes.     ADDENDUM: Her macrocytosis is likely related to medications (lamotrigine has been associated with macrocytosis). Repeat labs have shown that her iron studies have corrected, but further studies are pending (hemochromatosis analysis, Epo levels). At this point the iron overload seems to have resolved, and the MRI of the liver ultimately is not necessary. However, she should still move forward with colonoscopy and further GI evaluation.

## 2022-01-05 ENCOUNTER — TELEPHONE (OUTPATIENT)
Dept: HEMATOLOGY ONCOLOGY | Facility: MEDICAL CENTER | Age: 47
End: 2022-01-05

## 2022-01-05 ENCOUNTER — PATIENT MESSAGE (OUTPATIENT)
Dept: MEDICAL GROUP | Facility: MEDICAL CENTER | Age: 47
End: 2022-01-05

## 2022-01-05 DIAGNOSIS — Z12.11 SCREENING FOR COLON CANCER: ICD-10-CM

## 2022-01-05 NOTE — PATIENT COMMUNICATION
I think pt might need new referral for insurance change?  I have pended referral again.   You already did referral for this in November

## 2022-01-05 NOTE — TELEPHONE ENCOUNTER
Per request of Dr. Xiong, called pt to relay info of iron studies (per note to RN).  Pt verbalized understanding & stated she will be seeing her PCP next week & will ask for a referral to GI.  Verified pt had no further questions at this time.

## 2022-01-05 NOTE — TELEPHONE ENCOUNTER
From: Reanna Ojeda  To: Physician Rosalva Garcia  Sent: 1/5/2022 3:05 PM PST  Subject: GI referral     Hi Dr Garcia,  I met with Dr Xiong yesterday from hematology, some labs have come back and have ruled out hemochromatosis, I'm sure you might see his notes. He wants me to see a GI and get a colonoscopy done as soon as possible. I do have an appointment with you next week, but I thought reaching out sooner to you it could help speed up the process for a referral. I now have [facility] HMO, the premium for a PPO was too expensive for the new year. I hope to hear from you soon. Also, do you want me keep my appointment with you for next week?? Reanna Ojeda

## 2022-01-05 NOTE — TELEPHONE ENCOUNTER
----- Message from Caio Xiong D.O. sent at 1/5/2022  8:05 AM PST -----  Ivan Cooper,    Could you call this patient (I saw her yesterday) and let her know that her iron studies have all normalized and therefore there's no need to get the MRI? I'll cancel the order (it was an MRI liver to assess for hepatic iron concentration).    I'll look out for the rest of her labs but it looks like this was an acute process that has since resolved, and we'll need to have her see GI for colonoscopy and further evaluation (she's already established with them).     Caio

## 2022-01-06 LAB — EPO SERPL-ACNC: 6 MU/ML (ref 4–27)

## 2022-01-06 NOTE — TELEPHONE ENCOUNTER
Regarding: GI referral   ----- Message from Herman Baca Ass't sent at 1/5/2022  3:48 PM PST -----       ----- Message from Reanna Ojeda to Rosalva Garcia M.D. sent at 1/5/2022  3:05 PM -----   Hi Dr Garcia,  I met with Dr Xiong yesterday from hematology, some labs have come back and have ruled out hemochromatosis, I'm sure you might see his notes. He wants me to see a GI and get a colonoscopy done as soon as possible. I do have an appointment with you next week, but I thought reaching out sooner to you it could help speed up the process for a referral. [facility], the premium for a PPO was too expensive for the new year. I hope to hear from you soon. Also, do you want me keep my appointment with you for next week??  Reanna Ojeda

## 2022-01-09 LAB
HFE GENE MUT ANL BLD/T: NORMAL
HFE P.C282Y BLD/T QL: NEGATIVE
HFE P.H63D BLD/T QL: NEGATIVE
HFE P.S65C BLD/T QL: NEGATIVE

## 2022-01-10 ENCOUNTER — PATIENT MESSAGE (OUTPATIENT)
Dept: MEDICAL GROUP | Facility: MEDICAL CENTER | Age: 47
End: 2022-01-10

## 2022-01-11 ENCOUNTER — OFFICE VISIT (OUTPATIENT)
Dept: MEDICAL GROUP | Facility: MEDICAL CENTER | Age: 47
End: 2022-01-11
Payer: COMMERCIAL

## 2022-01-11 VITALS
WEIGHT: 114.64 LBS | HEIGHT: 66 IN | TEMPERATURE: 98.5 F | OXYGEN SATURATION: 100 % | HEART RATE: 100 BPM | SYSTOLIC BLOOD PRESSURE: 102 MMHG | DIASTOLIC BLOOD PRESSURE: 66 MMHG | BODY MASS INDEX: 18.42 KG/M2 | RESPIRATION RATE: 16 BRPM

## 2022-01-11 DIAGNOSIS — R63.4 WEIGHT LOSS: ICD-10-CM

## 2022-01-11 DIAGNOSIS — R19.00 INTRA-ABDOMINAL AND PELVIC SWELLING, MASS AND LUMP, UNSPECIFIED SITE: ICD-10-CM

## 2022-01-11 DIAGNOSIS — R63.4 UNEXPLAINED WEIGHT LOSS: ICD-10-CM

## 2022-01-11 DIAGNOSIS — R91.8 ABNORMAL FINDING ON LUNG IMAGING: ICD-10-CM

## 2022-01-11 PROCEDURE — 99213 OFFICE O/P EST LOW 20 MIN: CPT | Performed by: FAMILY MEDICINE

## 2022-01-11 ASSESSMENT — FIBROSIS 4 INDEX: FIB4 SCORE: 0.53

## 2022-01-11 NOTE — ASSESSMENT & PLAN NOTE
With episodic abdominal pain  Reviewed hematology consultation and follow up studies in detail   Agree with GI consultation and colonoscopy     Up to date all other cancer screening and lastly we agree to the optional repeat CT lung (feb is annual)     bmi 18.5 today     20+ min spent

## 2022-01-11 NOTE — PROGRESS NOTES
This medical record contains text that has been entered with the assistance of computer voice recognition and dictation software.  Therefore, it may contain unintended errors in text, spelling, punctuation, or grammar        Chief Complaint   Patient presents with   • Follow-Up     labs, saw Dr Tyrese Ojeda is a 46 y.o. female here evaluation and management of:    Follow up hematology   Was having unexplained wt loss and abnormal cbc and iron , completed heme consultation here today for follow up     Current Outpatient Medications   Medication Sig Dispense Refill   • norgestrel-ethinyl estradiol (ELINEST) 0.3-30 MG-MCG Tab TAKE ONE TABLET BY MOUTH ONCE DAILY 84 Tablet 3   • albuterol 108 (90 Base) MCG/ACT Aero Soln inhalation aerosol Inhale 2 Puffs every 6 hours as needed for Shortness of Breath. 8.5 g 0   • sertraline (ZOLOFT) 25 MG tablet Take 25 mg by mouth every day.     • lamotrigine (LAMICTAL) 200 MG tablet Take 400 mg by mouth every evening.     • traZODone (DESYREL) 100 MG Tab Take 100 mg by mouth every evening.       No current facility-administered medications for this visit.     Patient Active Problem List    Diagnosis Date Noted   • Iron overload 01/04/2022   • Macrocytosis 01/04/2022   • Constipation 01/04/2022   • Unexplained weight loss 11/02/2021   • Lung nodule < 6cm on CT 02/03/2021   • Pain of upper abdomen 02/03/2021   • Spotting 02/05/2020   • Neck pain 01/06/2020   • Stress due to illness of family member 10/28/2019   • Neoplasm of uncertain behavior of skin 10/23/2019   • Coccydynia 10/23/2019   • Atypical pigmented skin lesion 10/09/2019   • Perimenopause 08/26/2019   • Annual physical exam 08/26/2019   • Encounter for surveillance of contraceptive pills 08/15/2018   • Primary insomnia 08/15/2018   • Bipolar 2 disorder (HCC) 08/15/2018   • Preventative health care 08/15/2018   • Breast lump in female 08/15/2018     Past Surgical History:   Procedure Laterality Date   • CERVICAL  "DISK AND FUSION ANTERIOR  2020    Procedure: DISCECTOMY, SPINE, CERVICAL, ANTERIOR APPROACH, WITH FUSION-FOR ANTERIOR C5-6 ARTHROPLASY;  Surgeon: Jonel Ojeda M.D.;  Location: SURGERY [facility];  Service: Neurosurgery   • LUMPECTOMY Left 2019   • BREAST BIOPSY Left 2018    Procedure: BREAST BIOPSY- FOR EXCISION OF BREAST MASS;  Surgeon: Shraddha Moss M.D.;  Location: SURGERY SAME DAY [facility];  Service: General      Social History     Tobacco Use   • Smoking status: Current Every Day Smoker     Packs/day: 0.50     Years: 33.00     Pack years: 16.50     Types: Cigarettes     Last attempt to quit: 2019     Years since quittin.7   • Smokeless tobacco: Never Used   • Tobacco comment: Smoking cessation   Vaping Use   • Vaping Use: Never used   Substance Use Topics   • Alcohol use: No     Comment: Sober x 25 years   • Drug use: No     Comment: Sober x 25 years. Previously used speed.      Family History   Problem Relation Age of Onset   • Hypertension Mother    • Hypertension Father    • Cancer Father         Lung   • No Known Problems Sister            ROS    all review of system completed and negative except for those listed above     Objective:     /66 (BP Location: Right arm, Patient Position: Sitting, BP Cuff Size: Adult)   Pulse 100   Temp 36.9 °C (98.5 °F) (Temporal)   Resp 16   Ht 1.676 m (5' 6\")   Wt 52 kg (114 lb 10.2 oz)   SpO2 100%  Body mass index is 18.5 kg/m².  Physical Exam:        GEN: comfortable, alert and oriented, well nourished, well developed, in no apparent distress   HEENT: NCAT, eyes: pupils equal and reactive, sclera white, EOMIT, good dentition  HEART: limbs warm and well perfused, regular rate, no JVD, no lower extremity edema  LUNGS: speaking in full sentences, not in apparent respiratory distress, no audible wheezes  MSK: normal tone and bulk, no swelling of the joints, gait steady and normal         Assessment and Plan:   The following " treatment plan was discussed        Problem List Items Addressed This Visit     Unexplained weight loss     With episodic abdominal pain  Reviewed hematology consultation and follow up studies in detail   Agree with GI consultation and colonoscopy     Up to date all other cancer screening and lastly we agree to the optional repeat CT lung (feb is annual)     bmi 18.5 today     20+ min spent                Other Visit Diagnoses     Abnormal finding on lung imaging        Relevant Orders    CT-CHEST, HIGH RESOLUTION LUNG    Weight loss        Relevant Orders    Referral to GI for Colonoscopy    Referral to Gastroenterology    Intra-abdominal and pelvic swelling, mass and lump, unspecified site        Relevant Orders    MR-ABDOMEN-WITH & W/O                Instructed to follow up if symptoms worsen or fail to improve, ER/UC precautions discussed as well    Rosalva Garcia MD  [facility], Family Medicine   [address] [place], [place] [address]y   [place] QU[place] [address]  Phone: [phone]

## 2022-01-20 ENCOUNTER — TELEPHONE (OUTPATIENT)
Dept: MEDICAL GROUP | Facility: MEDICAL CENTER | Age: 47
End: 2022-01-20

## 2022-01-20 DIAGNOSIS — R14.0 ABDOMINAL DISTENSION (GASEOUS): ICD-10-CM

## 2022-01-21 ENCOUNTER — HOSPITAL ENCOUNTER (OUTPATIENT)
Dept: RADIOLOGY | Facility: MEDICAL CENTER | Age: 47
End: 2022-01-21
Attending: FAMILY MEDICINE
Payer: COMMERCIAL

## 2022-01-21 DIAGNOSIS — R14.0 ABDOMINAL DISTENSION (GASEOUS): ICD-10-CM

## 2022-01-21 PROCEDURE — A9576 INJ PROHANCE MULTIPACK: HCPCS | Performed by: FAMILY MEDICINE

## 2022-01-21 PROCEDURE — 700117 HCHG RX CONTRAST REV CODE 255: Performed by: FAMILY MEDICINE

## 2022-01-21 PROCEDURE — 74183 MRI ABD W/O CNTR FLWD CNTR: CPT

## 2022-01-21 RX ADMIN — GADOTERIDOL 10 ML: 279.3 INJECTION, SOLUTION INTRAVENOUS at 18:44

## 2022-01-30 ENCOUNTER — APPOINTMENT (OUTPATIENT)
Dept: RADIOLOGY | Facility: MEDICAL CENTER | Age: 47
End: 2022-01-30
Attending: FAMILY MEDICINE
Payer: COMMERCIAL

## 2022-01-30 DIAGNOSIS — R91.8 ABNORMAL FINDING ON LUNG IMAGING: ICD-10-CM

## 2022-01-30 PROCEDURE — 71250 CT THORAX DX C-: CPT

## 2022-02-01 DIAGNOSIS — R93.89 ABNORMAL CT OF THE CHEST: ICD-10-CM

## 2022-02-17 ENCOUNTER — HOSPITAL ENCOUNTER (OUTPATIENT)
Dept: RADIOLOGY | Facility: MEDICAL CENTER | Age: 47
End: 2022-02-17
Attending: INTERNAL MEDICINE
Payer: COMMERCIAL

## 2022-02-17 DIAGNOSIS — R10.11 ABDOMINAL PAIN, RIGHT UPPER QUADRANT: ICD-10-CM

## 2022-02-17 PROCEDURE — A9537 TC99M MEBROFENIN: HCPCS

## 2022-02-24 ENCOUNTER — OFFICE VISIT (OUTPATIENT)
Dept: MEDICAL GROUP | Facility: MEDICAL CENTER | Age: 47
End: 2022-02-24
Payer: COMMERCIAL

## 2022-02-24 VITALS
SYSTOLIC BLOOD PRESSURE: 106 MMHG | OXYGEN SATURATION: 99 % | BODY MASS INDEX: 18 KG/M2 | WEIGHT: 112 LBS | HEIGHT: 66 IN | RESPIRATION RATE: 16 BRPM | HEART RATE: 80 BPM | DIASTOLIC BLOOD PRESSURE: 66 MMHG | TEMPERATURE: 99 F

## 2022-02-24 DIAGNOSIS — R10.84 GENERALIZED ABDOMINAL PAIN: ICD-10-CM

## 2022-02-24 PROCEDURE — 99213 OFFICE O/P EST LOW 20 MIN: CPT | Performed by: FAMILY MEDICINE

## 2022-02-24 RX ORDER — DICYCLOMINE HCL 20 MG
1 TABLET ORAL PRN
Status: ON HOLD | COMMUNITY
Start: 2022-02-12 | End: 2022-03-25

## 2022-02-24 ASSESSMENT — FIBROSIS 4 INDEX: FIB4 SCORE: 0.53

## 2022-02-24 NOTE — ASSESSMENT & PLAN NOTE
periumbilial abominal pain   In the setting of unintentional wt loss   She has completed a extensive work up and did consult with Gastroenterology   Note of some gall bladder dysfunction on HIDA   I do feel that her symptoms of episodic stomach pain could be related and I agree with general surgery consultation   I do encourage her to have at least one more follow up with gi to discuss these results as well   30+ min spent

## 2022-02-24 NOTE — PROGRESS NOTES
This medical record contains text that has been entered with the assistance of computer voice recognition and dictation software.  Therefore, it may contain unintended errors in text, spelling, punctuation, or grammar        Chief Complaint   Patient presents with   • Follow-Up     Follow up with imaging       Reanna Ojeda is a 46 y.o. female here evaluation and management of:    Follow up gi   Was having unexplained wt loss and abnormal cbc and iron , completed heme consultation, working with GI     Having episodic stomach pain   Weight has stabilized          Current Outpatient Medications   Medication Sig Dispense Refill   • dicyclomine (BENTYL) 20 MG Tab Take 1 Tablet by mouth as needed. Prn up to two tabs daily     • norgestrel-ethinyl estradiol (ELINEST) 0.3-30 MG-MCG Tab TAKE ONE TABLET BY MOUTH ONCE DAILY 84 Tablet 3   • albuterol 108 (90 Base) MCG/ACT Aero Soln inhalation aerosol Inhale 2 Puffs every 6 hours as needed for Shortness of Breath. 8.5 g 0   • sertraline (ZOLOFT) 25 MG tablet Take 25 mg by mouth every day.     • lamotrigine (LAMICTAL) 200 MG tablet Take 400 mg by mouth every evening.     • traZODone (DESYREL) 100 MG Tab Take 100 mg by mouth every evening.       No current facility-administered medications for this visit.     Patient Active Problem List    Diagnosis Date Noted   • Generalized abdominal pain 02/24/2022   • Iron overload 01/04/2022   • Macrocytosis 01/04/2022   • Constipation 01/04/2022   • Unexplained weight loss 11/02/2021   • Lung nodule < 6cm on CT 02/03/2021   • Pain of upper abdomen 02/03/2021   • Spotting 02/05/2020   • Neck pain 01/06/2020   • Stress due to illness of family member 10/28/2019   • Neoplasm of uncertain behavior of skin 10/23/2019   • Coccydynia 10/23/2019   • Atypical pigmented skin lesion 10/09/2019   • Perimenopause 08/26/2019   • Annual physical exam 08/26/2019   • Encounter for surveillance of contraceptive pills 08/15/2018   • Primary insomnia  "08/15/2018   • Bipolar 2 disorder (HCC) 08/15/2018   • Preventative health care 08/15/2018   • Breast lump in female 08/15/2018     Past Surgical History:   Procedure Laterality Date   • CERVICAL DISK AND FUSION ANTERIOR  2020    Procedure: DISCECTOMY, SPINE, CERVICAL, ANTERIOR APPROACH, WITH FUSION-FOR ANTERIOR C5-6 ARTHROPLASY;  Surgeon: Jonel Ojeda M.D.;  Location: SURGERY [facility];  Service: Neurosurgery   • LUMPECTOMY Left 2019   • BREAST BIOPSY Left 2018    Procedure: BREAST BIOPSY- FOR EXCISION OF BREAST MASS;  Surgeon: Shraddha Moss M.D.;  Location: SURGERY SAME DAY [facility];  Service: General      Social History     Tobacco Use   • Smoking status: Current Every Day Smoker     Packs/day: 0.50     Years: 33.00     Pack years: 16.50     Types: Cigarettes     Last attempt to quit: 2019     Years since quittin.8   • Smokeless tobacco: Never Used   • Tobacco comment: Smoking cessation   Vaping Use   • Vaping Use: Never used   Substance Use Topics   • Alcohol use: No     Comment: Sober x 25 years   • Drug use: No     Comment: Sober x 25 years. Previously used speed.      Family History   Problem Relation Age of Onset   • Hypertension Mother    • Hypertension Father    • Cancer Father         Lung   • No Known Problems Sister            ROS    all review of system completed and negative except for those listed above     Objective:     /66 (BP Location: Left arm, Patient Position: Sitting, BP Cuff Size: Adult)   Pulse 80   Temp 37.2 °C (99 °F) (Temporal)   Resp 16   Ht 1.676 m (5' 6\")   Wt 50.8 kg (112 lb)   SpO2 99%  Body mass index is 18.08 kg/m².  Physical Exam:        GEN: comfortable, alert and oriented, well nourished, well developed, in no apparent distress   HEENT: NCAT, eyes: pupils equal and reactive, sclera white, EOMIT, good dentition  HEART: limbs warm and well perfused, regular rate, no JVD, no lower extremity edema  LUNGS: speaking in full " sentences, not in apparent respiratory distress, no audible wheezes  MSK: normal tone and bulk, no swelling of the joints, gait steady and normal         Assessment and Plan:   The following treatment plan was discussed        Problem List Items Addressed This Visit     Generalized abdominal pain     periumbilial abominal pain   In the setting of unintentional wt loss   She has completed a extensive work up and did consult with Gastroenterology   Note of some gall bladder dysfunction on HIDA   I do feel that her symptoms of episodic stomach pain could be related and I agree with general surgery consultation   I do encourage her to have at least one more follow up with gi to discuss these results as well   20+ min spent                          Instructed to follow up if symptoms worsen or fail to improve, ER/UC precautions discussed as well    Rosalva Garcia MD  [facility], Family Medicine   [address] [place] [address]   [place] [address]  Phone: [phone]

## 2022-03-02 ENCOUNTER — PATIENT MESSAGE (OUTPATIENT)
Dept: MEDICAL GROUP | Facility: MEDICAL CENTER | Age: 47
End: 2022-03-02
Payer: COMMERCIAL

## 2022-03-02 DIAGNOSIS — R10.9 ABDOMINAL PAIN, UNSPECIFIED ABDOMINAL LOCATION: ICD-10-CM

## 2022-03-02 NOTE — TELEPHONE ENCOUNTER
From: Reanna Ojeda  To: Physician Rosalva Garcia  Sent: 3/2/2022 9:49 AM PST  Subject: Referral to the surgeon    Good morning,  I received a call from [facility] this morning. Because my insurance is [facility], they need a referral and authorization from you as my PCP, not just from Dr Orellana. My appointment with Dr Moss is next Tuesday. Please let me know when you are able to send the referral to them? As always, you are all awesome!  Reanna

## 2022-03-02 NOTE — PATIENT COMMUNICATION
1. Caller Name: Reanna Ojeda                        Call Back Number: [phone] (home)       How would the patient prefer to be contacted with a response: The [employer] message    2. SPECIFIC Action To Be Taken: Referral pending, please sign.    3. Diagnosis/Clinical Reason for Request: Unknown    4. Specialty & Provider Name/Lab/Imaging Location: [facility]    5. Is appointment scheduled for requested order/referral: yes - 3/8/22    Patient was informed they will receive a return phone call from the office ONLY if there are any questions before processing their request. Advised to call back if they haven't received a call from the referral department in 5 days.

## 2022-03-09 ENCOUNTER — PATIENT MESSAGE (OUTPATIENT)
Dept: MEDICAL GROUP | Facility: MEDICAL CENTER | Age: 47
End: 2022-03-09
Payer: COMMERCIAL

## 2022-03-09 ENCOUNTER — HOSPITAL ENCOUNTER (OUTPATIENT)
Facility: MEDICAL CENTER | Age: 47
End: 2022-03-09
Attending: SURGERY | Admitting: SURGERY
Payer: COMMERCIAL

## 2022-03-21 ENCOUNTER — PRE-ADMISSION TESTING (OUTPATIENT)
Dept: ADMISSIONS | Facility: MEDICAL CENTER | Age: 47
End: 2022-03-21
Attending: SURGERY
Payer: COMMERCIAL

## 2022-03-21 DIAGNOSIS — Z01.812 PRE-OPERATIVE LABORATORY EXAMINATION: ICD-10-CM

## 2022-03-21 LAB
ANION GAP SERPL CALC-SCNC: 11 MMOL/L (ref 7–16)
BUN SERPL-MCNC: 14 MG/DL (ref 8–22)
CALCIUM SERPL-MCNC: 9.5 MG/DL (ref 8.5–10.5)
CHLORIDE SERPL-SCNC: 104 MMOL/L (ref 96–112)
CO2 SERPL-SCNC: 24 MMOL/L (ref 20–33)
CREAT SERPL-MCNC: 0.75 MG/DL (ref 0.5–1.4)
ERYTHROCYTE [DISTWIDTH] IN BLOOD BY AUTOMATED COUNT: 44.7 FL (ref 35.9–50)
GFR SERPLBLD CREATININE-BSD FMLA CKD-EPI: 98 ML/MIN/1.73 M 2
GLUCOSE SERPL-MCNC: 103 MG/DL (ref 65–99)
HCT VFR BLD AUTO: 44.5 % (ref 37–47)
HGB BLD-MCNC: 14.3 G/DL (ref 12–16)
MCH RBC QN AUTO: 31.8 PG (ref 27–33)
MCHC RBC AUTO-ENTMCNC: 32.1 G/DL (ref 33.6–35)
MCV RBC AUTO: 99.1 FL (ref 81.4–97.8)
PLATELET # BLD AUTO: 307 K/UL (ref 164–446)
PMV BLD AUTO: 10.3 FL (ref 9–12.9)
POTASSIUM SERPL-SCNC: 4.9 MMOL/L (ref 3.6–5.5)
RBC # BLD AUTO: 4.49 M/UL (ref 4.2–5.4)
SARS-COV-2 RNA RESP QL NAA+PROBE: NOTDETECTED
SODIUM SERPL-SCNC: 139 MMOL/L (ref 135–145)
SPECIMEN SOURCE: NORMAL
WBC # BLD AUTO: 7.1 K/UL (ref 4.8–10.8)

## 2022-03-21 PROCEDURE — U0003 INFECTIOUS AGENT DETECTION BY NUCLEIC ACID (DNA OR RNA); SEVERE ACUTE RESPIRATORY SYNDROME CORONAVIRUS 2 (SARS-COV-2) (CORONAVIRUS DISEASE [COVID-19]), AMPLIFIED PROBE TECHNIQUE, MAKING USE OF HIGH THROUGHPUT TECHNOLOGIES AS DESCRIBED BY CMS-2020-01-R: HCPCS

## 2022-03-21 PROCEDURE — 85027 COMPLETE CBC AUTOMATED: CPT

## 2022-03-21 PROCEDURE — 36415 COLL VENOUS BLD VENIPUNCTURE: CPT

## 2022-03-21 PROCEDURE — 80048 BASIC METABOLIC PNL TOTAL CA: CPT

## 2022-03-21 PROCEDURE — C9803 HOPD COVID-19 SPEC COLLECT: HCPCS

## 2022-03-21 PROCEDURE — U0005 INFEC AGEN DETEC AMPLI PROBE: HCPCS

## 2022-03-21 ASSESSMENT — FIBROSIS 4 INDEX: FIB4 SCORE: 0.53

## 2022-03-25 ENCOUNTER — APPOINTMENT (OUTPATIENT)
Dept: ADMISSIONS | Facility: MEDICAL CENTER | Age: 47
End: 2022-03-25
Attending: SURGERY
Payer: COMMERCIAL

## 2022-03-25 ENCOUNTER — HOSPITAL ENCOUNTER (OUTPATIENT)
Facility: MEDICAL CENTER | Age: 47
End: 2022-03-25
Attending: SURGERY | Admitting: SURGERY
Payer: COMMERCIAL

## 2022-03-25 ENCOUNTER — ANESTHESIA (OUTPATIENT)
Dept: SURGERY | Facility: MEDICAL CENTER | Age: 47
End: 2022-03-25
Payer: COMMERCIAL

## 2022-03-25 ENCOUNTER — ANESTHESIA EVENT (OUTPATIENT)
Dept: SURGERY | Facility: MEDICAL CENTER | Age: 47
End: 2022-03-25
Payer: COMMERCIAL

## 2022-03-25 VITALS
HEART RATE: 64 BPM | TEMPERATURE: 97 F | OXYGEN SATURATION: 95 % | BODY MASS INDEX: 18.14 KG/M2 | WEIGHT: 112.88 LBS | HEIGHT: 66 IN | DIASTOLIC BLOOD PRESSURE: 61 MMHG | SYSTOLIC BLOOD PRESSURE: 127 MMHG | RESPIRATION RATE: 20 BRPM

## 2022-03-25 DIAGNOSIS — G89.18 POSTOPERATIVE PAIN: ICD-10-CM

## 2022-03-25 PROBLEM — R10.10 PAIN OF UPPER ABDOMEN: Status: RESOLVED | Noted: 2021-02-03 | Resolved: 2022-03-25

## 2022-03-25 PROBLEM — R10.84 GENERALIZED ABDOMINAL PAIN: Status: RESOLVED | Noted: 2022-02-24 | Resolved: 2022-03-25

## 2022-03-25 LAB
HCG UR QL: NEGATIVE
PATHOLOGY CONSULT NOTE: NORMAL

## 2022-03-25 PROCEDURE — 500002 HCHG ADHESIVE, DERMABOND: Performed by: SURGERY

## 2022-03-25 PROCEDURE — 700111 HCHG RX REV CODE 636 W/ 250 OVERRIDE (IP): Performed by: ANESTHESIOLOGY

## 2022-03-25 PROCEDURE — 500854 HCHG NEEDLE, INSUFFLATION FOR STEP: Performed by: SURGERY

## 2022-03-25 PROCEDURE — 160009 HCHG ANES TIME/MIN: Performed by: SURGERY

## 2022-03-25 PROCEDURE — 160048 HCHG OR STATISTICAL LEVEL 1-5: Performed by: SURGERY

## 2022-03-25 PROCEDURE — A9270 NON-COVERED ITEM OR SERVICE: HCPCS | Performed by: ANESTHESIOLOGY

## 2022-03-25 PROCEDURE — 160029 HCHG SURGERY MINUTES - 1ST 30 MINS LEVEL 4: Performed by: SURGERY

## 2022-03-25 PROCEDURE — 160025 RECOVERY II MINUTES (STATS): Performed by: SURGERY

## 2022-03-25 PROCEDURE — 700102 HCHG RX REV CODE 250 W/ 637 OVERRIDE(OP): Performed by: ANESTHESIOLOGY

## 2022-03-25 PROCEDURE — 160002 HCHG RECOVERY MINUTES (STAT): Performed by: SURGERY

## 2022-03-25 PROCEDURE — 700105 HCHG RX REV CODE 258: Performed by: ANESTHESIOLOGY

## 2022-03-25 PROCEDURE — 700101 HCHG RX REV CODE 250: Performed by: ANESTHESIOLOGY

## 2022-03-25 PROCEDURE — 501838 HCHG SUTURE GENERAL: Performed by: SURGERY

## 2022-03-25 PROCEDURE — 88304 TISSUE EXAM BY PATHOLOGIST: CPT

## 2022-03-25 PROCEDURE — 501399 HCHG SPECIMAN BAG, ENDO CATC: Performed by: SURGERY

## 2022-03-25 PROCEDURE — 700101 HCHG RX REV CODE 250: Performed by: SURGERY

## 2022-03-25 PROCEDURE — 160035 HCHG PACU - 1ST 60 MINS PHASE I: Performed by: SURGERY

## 2022-03-25 PROCEDURE — 501582 HCHG TROCAR, THRD BLADED: Performed by: SURGERY

## 2022-03-25 PROCEDURE — 160047 HCHG PACU  - EA ADDL 30 MINS PHASE II: Performed by: SURGERY

## 2022-03-25 PROCEDURE — 81025 URINE PREGNANCY TEST: CPT

## 2022-03-25 PROCEDURE — 160046 HCHG PACU - 1ST 60 MINS PHASE II: Performed by: SURGERY

## 2022-03-25 RX ORDER — LABETALOL HYDROCHLORIDE 5 MG/ML
5 INJECTION, SOLUTION INTRAVENOUS
Status: DISCONTINUED | OUTPATIENT
Start: 2022-03-25 | End: 2022-03-25 | Stop reason: HOSPADM

## 2022-03-25 RX ORDER — OXYCODONE HCL 10 MG/1
10 TABLET, FILM COATED, EXTENDED RELEASE ORAL ONCE
Status: COMPLETED | OUTPATIENT
Start: 2022-03-25 | End: 2022-03-25

## 2022-03-25 RX ORDER — SODIUM CHLORIDE, SODIUM LACTATE, POTASSIUM CHLORIDE, CALCIUM CHLORIDE 600; 310; 30; 20 MG/100ML; MG/100ML; MG/100ML; MG/100ML
INJECTION, SOLUTION INTRAVENOUS CONTINUOUS
Status: DISCONTINUED | OUTPATIENT
Start: 2022-03-25 | End: 2022-03-25 | Stop reason: HOSPADM

## 2022-03-25 RX ORDER — KETOROLAC TROMETHAMINE 30 MG/ML
INJECTION, SOLUTION INTRAMUSCULAR; INTRAVENOUS PRN
Status: DISCONTINUED | OUTPATIENT
Start: 2022-03-25 | End: 2022-03-25 | Stop reason: SURG

## 2022-03-25 RX ORDER — BUPIVACAINE HYDROCHLORIDE AND EPINEPHRINE 5; 5 MG/ML; UG/ML
INJECTION, SOLUTION EPIDURAL; INTRACAUDAL; PERINEURAL
Status: DISCONTINUED
Start: 2022-03-25 | End: 2022-03-25 | Stop reason: HOSPADM

## 2022-03-25 RX ORDER — SODIUM CHLORIDE, SODIUM LACTATE, POTASSIUM CHLORIDE, CALCIUM CHLORIDE 600; 310; 30; 20 MG/100ML; MG/100ML; MG/100ML; MG/100ML
INJECTION, SOLUTION INTRAVENOUS
Status: DISCONTINUED | OUTPATIENT
Start: 2022-03-25 | End: 2022-03-25 | Stop reason: SURG

## 2022-03-25 RX ORDER — MIDAZOLAM HYDROCHLORIDE 1 MG/ML
INJECTION INTRAMUSCULAR; INTRAVENOUS PRN
Status: DISCONTINUED | OUTPATIENT
Start: 2022-03-25 | End: 2022-03-25 | Stop reason: SURG

## 2022-03-25 RX ORDER — HYDROMORPHONE HYDROCHLORIDE 1 MG/ML
0.1 INJECTION, SOLUTION INTRAMUSCULAR; INTRAVENOUS; SUBCUTANEOUS
Status: DISCONTINUED | OUTPATIENT
Start: 2022-03-25 | End: 2022-03-25 | Stop reason: HOSPADM

## 2022-03-25 RX ORDER — HYDROMORPHONE HYDROCHLORIDE 1 MG/ML
0.4 INJECTION, SOLUTION INTRAMUSCULAR; INTRAVENOUS; SUBCUTANEOUS
Status: DISCONTINUED | OUTPATIENT
Start: 2022-03-25 | End: 2022-03-25 | Stop reason: HOSPADM

## 2022-03-25 RX ORDER — HYDROMORPHONE HYDROCHLORIDE 2 MG/ML
INJECTION, SOLUTION INTRAMUSCULAR; INTRAVENOUS; SUBCUTANEOUS PRN
Status: DISCONTINUED | OUTPATIENT
Start: 2022-03-25 | End: 2022-03-25 | Stop reason: SURG

## 2022-03-25 RX ORDER — DEXAMETHASONE SODIUM PHOSPHATE 4 MG/ML
INJECTION, SOLUTION INTRA-ARTICULAR; INTRALESIONAL; INTRAMUSCULAR; INTRAVENOUS; SOFT TISSUE PRN
Status: DISCONTINUED | OUTPATIENT
Start: 2022-03-25 | End: 2022-03-25 | Stop reason: SURG

## 2022-03-25 RX ORDER — HYDROMORPHONE HYDROCHLORIDE 1 MG/ML
0.2 INJECTION, SOLUTION INTRAMUSCULAR; INTRAVENOUS; SUBCUTANEOUS
Status: DISCONTINUED | OUTPATIENT
Start: 2022-03-25 | End: 2022-03-25 | Stop reason: HOSPADM

## 2022-03-25 RX ORDER — ONDANSETRON 2 MG/ML
4 INJECTION INTRAMUSCULAR; INTRAVENOUS
Status: DISCONTINUED | OUTPATIENT
Start: 2022-03-25 | End: 2022-03-25 | Stop reason: HOSPADM

## 2022-03-25 RX ORDER — HYDROCODONE BITARTRATE AND ACETAMINOPHEN 5; 325 MG/1; MG/1
1 TABLET ORAL EVERY 4 HOURS PRN
Qty: 20 TABLET | Refills: 0 | Status: SHIPPED | OUTPATIENT
Start: 2022-03-25 | End: 2022-04-01

## 2022-03-25 RX ORDER — MIDAZOLAM HYDROCHLORIDE 1 MG/ML
1 INJECTION INTRAMUSCULAR; INTRAVENOUS
Status: DISCONTINUED | OUTPATIENT
Start: 2022-03-25 | End: 2022-03-25 | Stop reason: HOSPADM

## 2022-03-25 RX ORDER — OXYCODONE HCL 5 MG/5 ML
10 SOLUTION, ORAL ORAL
Status: COMPLETED | OUTPATIENT
Start: 2022-03-25 | End: 2022-03-25

## 2022-03-25 RX ORDER — DIPHENHYDRAMINE HYDROCHLORIDE 50 MG/ML
12.5 INJECTION INTRAMUSCULAR; INTRAVENOUS
Status: DISCONTINUED | OUTPATIENT
Start: 2022-03-25 | End: 2022-03-25 | Stop reason: HOSPADM

## 2022-03-25 RX ORDER — CEFAZOLIN SODIUM 1 G/3ML
INJECTION, POWDER, FOR SOLUTION INTRAMUSCULAR; INTRAVENOUS PRN
Status: DISCONTINUED | OUTPATIENT
Start: 2022-03-25 | End: 2022-03-25 | Stop reason: SURG

## 2022-03-25 RX ORDER — LIDOCAINE HYDROCHLORIDE 20 MG/ML
INJECTION, SOLUTION EPIDURAL; INFILTRATION; INTRACAUDAL; PERINEURAL PRN
Status: DISCONTINUED | OUTPATIENT
Start: 2022-03-25 | End: 2022-03-25 | Stop reason: SURG

## 2022-03-25 RX ORDER — ROCURONIUM BROMIDE 10 MG/ML
INJECTION, SOLUTION INTRAVENOUS PRN
Status: DISCONTINUED | OUTPATIENT
Start: 2022-03-25 | End: 2022-03-25 | Stop reason: SURG

## 2022-03-25 RX ORDER — OXYCODONE HCL 5 MG/5 ML
5 SOLUTION, ORAL ORAL
Status: COMPLETED | OUTPATIENT
Start: 2022-03-25 | End: 2022-03-25

## 2022-03-25 RX ORDER — ACETAMINOPHEN 500 MG
1000 TABLET ORAL ONCE
Status: COMPLETED | OUTPATIENT
Start: 2022-03-25 | End: 2022-03-25

## 2022-03-25 RX ORDER — MEPERIDINE HYDROCHLORIDE 25 MG/ML
12.5 INJECTION INTRAMUSCULAR; INTRAVENOUS; SUBCUTANEOUS
Status: DISCONTINUED | OUTPATIENT
Start: 2022-03-25 | End: 2022-03-25 | Stop reason: HOSPADM

## 2022-03-25 RX ORDER — HALOPERIDOL 5 MG/ML
1 INJECTION INTRAMUSCULAR
Status: DISCONTINUED | OUTPATIENT
Start: 2022-03-25 | End: 2022-03-25 | Stop reason: HOSPADM

## 2022-03-25 RX ORDER — BUPIVACAINE HYDROCHLORIDE AND EPINEPHRINE 5; 5 MG/ML; UG/ML
INJECTION, SOLUTION PERINEURAL
Status: DISCONTINUED | OUTPATIENT
Start: 2022-03-25 | End: 2022-03-25 | Stop reason: HOSPADM

## 2022-03-25 RX ORDER — ONDANSETRON 2 MG/ML
INJECTION INTRAMUSCULAR; INTRAVENOUS PRN
Status: DISCONTINUED | OUTPATIENT
Start: 2022-03-25 | End: 2022-03-25 | Stop reason: SURG

## 2022-03-25 RX ADMIN — ROCURONIUM BROMIDE 50 MG: 10 INJECTION, SOLUTION INTRAVENOUS at 08:11

## 2022-03-25 RX ADMIN — SUGAMMADEX 200 MG: 100 INJECTION, SOLUTION INTRAVENOUS at 08:30

## 2022-03-25 RX ADMIN — FENTANYL CITRATE 50 MCG: 50 INJECTION, SOLUTION INTRAMUSCULAR; INTRAVENOUS at 09:17

## 2022-03-25 RX ADMIN — LIDOCAINE HYDROCHLORIDE 100 MG: 20 INJECTION, SOLUTION EPIDURAL; INFILTRATION; INTRACAUDAL at 08:11

## 2022-03-25 RX ADMIN — PROPOFOL 140 MG: 10 INJECTION, EMULSION INTRAVENOUS at 08:11

## 2022-03-25 RX ADMIN — SODIUM CHLORIDE, POTASSIUM CHLORIDE, SODIUM LACTATE AND CALCIUM CHLORIDE: 600; 310; 30; 20 INJECTION, SOLUTION INTRAVENOUS at 08:08

## 2022-03-25 RX ADMIN — MEPERIDINE HYDROCHLORIDE 12.5 MG: 25 INJECTION INTRAMUSCULAR; INTRAVENOUS; SUBCUTANEOUS at 09:23

## 2022-03-25 RX ADMIN — DEXAMETHASONE SODIUM PHOSPHATE 8 MG: 4 INJECTION, SOLUTION INTRA-ARTICULAR; INTRALESIONAL; INTRAMUSCULAR; INTRAVENOUS; SOFT TISSUE at 08:11

## 2022-03-25 RX ADMIN — FENTANYL CITRATE 50 MCG: 50 INJECTION, SOLUTION INTRAMUSCULAR; INTRAVENOUS at 09:04

## 2022-03-25 RX ADMIN — KETOROLAC TROMETHAMINE 30 MG: 30 INJECTION, SOLUTION INTRAMUSCULAR at 08:32

## 2022-03-25 RX ADMIN — MIDAZOLAM HYDROCHLORIDE 2 MG: 1 INJECTION, SOLUTION INTRAMUSCULAR; INTRAVENOUS at 08:08

## 2022-03-25 RX ADMIN — OXYCODONE HYDROCHLORIDE 10 MG: 10 TABLET, FILM COATED, EXTENDED RELEASE ORAL at 08:04

## 2022-03-25 RX ADMIN — HYDROMORPHONE HYDROCHLORIDE 0.3 MG: 2 INJECTION INTRAMUSCULAR; INTRAVENOUS; SUBCUTANEOUS at 08:20

## 2022-03-25 RX ADMIN — CEFAZOLIN 2 G: 330 INJECTION, POWDER, FOR SOLUTION INTRAMUSCULAR; INTRAVENOUS at 08:13

## 2022-03-25 RX ADMIN — ACETAMINOPHEN 1000 MG: 500 TABLET ORAL at 08:04

## 2022-03-25 RX ADMIN — ONDANSETRON 4 MG: 2 INJECTION INTRAMUSCULAR; INTRAVENOUS at 08:11

## 2022-03-25 ASSESSMENT — PAIN SCALES - GENERAL: PAIN_LEVEL: 1

## 2022-03-25 ASSESSMENT — PAIN DESCRIPTION - PAIN TYPE
TYPE: SURGICAL PAIN

## 2022-03-25 ASSESSMENT — FIBROSIS 4 INDEX: FIB4 SCORE: 0.57

## 2022-03-25 NOTE — PROGRESS NOTES
Laparoscopic Cholecystectomy D/C instructions:    1. DIET: Upon discharge from the hospital you may resume your normal preoperative diet. Depending on how you are feeling and whether you have nausea or not, you may wish to stay with a bland diet for the first few days. However, you can advance this as quickly as you feel ready.    2. ACTIVITIES: After discharge from the hospital, you may resume full routine activities. However, there should be no heavy lifting (greater than 15 pounds) and no strenuous activities until after your follow-up visit. Otherwise, routine activities of daily living are acceptable.    3. DRIVING: You may drive whenever you are off pain medications and are able to perform the activities needed to drive, i.e. turning, bending, twisting, etc.    4. BATHING: You may get the wound wet at any time after leaving the hospital. You may shower, but do not submerge in a bath for at least a week.     5. BOWEL FUNCTION: A few patients, after this operation, will develop either frequent or loose stools after meals. This usually corrects itself after a few days, to a few weeks. If this occurs, do not worry; it is not unusual and will resolve. Much more common than loose stools, is constipation. The combination of pain medication and decreased activity level can cause constipation in otherwise normal patients. If you feel this is occurring, take a laxative (Milk of Magnesia, Ex-Lax, Senokot, etc.) until the problem has resolved.    6. PAIN MEDICATION: You will be given a prescription for pain medication at discharge. Please take these as directed. It is important to remember not to take medications on an empty stomach as this may cause nausea.     It is also helpful to take other non-narcotic over the counter medications to help with pain control and to decrease the need for narcotic medications. I recommend ibuprofen, taken every 8 hours, dosing as directed on the medication bottle.    It is useful to  slowly decrease the number of narcotic pain medications you take starting the first day after surgery, as you are able. If you need a refill, Dr. Moss's office will provide you with one refill at your post-operative visit. After this, no more narcotic pain medications will be given.     7.CALL IF YOU HAVE: (1) Fevers to more than 1010 F, (2) Unusual chest or leg pain, (3) Drainage or fluid from incision that may be foul smelling, increased tenderness or soreness at the wound or the wound edges are no longer together, redness or swelling at the incision site. Please do not hesitate to call with any other questions.     8. APPOINTMENT: Contact our office at [phone] for a follow-up appointment in 1 to 2 weeks following your procedure.    If you have any additional questions, please do not hesitate to call the office and speak to either myself or the physician on call.    Office address:  [address] [place] [address] [id number]  [place] [id number]    Shraddha Moss M.D.  [facility]  [phone]

## 2022-03-25 NOTE — OR NURSING
0837  RECEIVED PATIENT FROM OR.  REPORT FROM DR. ALONSO.  ORAL AIRWAY IN PLACE.  RESPIRATIONS ARE EVEN AND UNLABORED.  4 LAP STABS TO ABDOMEN COVERED WITH DERMA BOND ARE CDI.      0844  ORAL AIRWAY DC'D WITHOUT DIFFICULTY.      0917  MEDICATED WITH IV FENTANYL.        0923  MEDICATED WITH IV DEMEROL FOR SHIVERING.    0930  PHASE 2.    1005  REPORT TO VÍCTOR PANG.    1020  RECEIVED REPORT FROM VÍCTOR PANG.  RESUMED CARE OF PATIENT.    1100  DISCHARGED.  DISCHARGE INSTRUCTIONS GIVEN TO  YIFAN.  A VERBAL UNDERSTANDING OF ALL INSTRUCTIONS WAS STATED.  PATIENT TAKING PO AND AMBULATING WITHOUT DIFFICULTY.  PATIENT STATES SHE IS READY TO GO HOME.

## 2022-03-25 NOTE — OP REPORT
Operative Report    Date: 3/25/2022    Surgeon: Shraddha Moss M.D.    Assistant: PIERO Washington    My assistant PIERO Washington, was medically necessary for this procedure. He held the laparoscope for the duration of the procedure so I could visualize the surgical field, as well as retracted the gallbladder and assisted in incisional closure.    Anesthesiologist: John NEWSOME    Preoperative Diagnosis: K82.8 biliary dyskinesia    Postoperative Diagnosis: same    Procedure Performed: 15210 Laparoscopy, surgical; cholecystectomy    Indications: This is a 46 y.o. female who presented with abdominal pain. History, physical and diagnostic studies are consistent with biliary dyskinesia.    An extensive procedures, alternative, risks and questions conference was held with the patient and her family, in regard to the surgical treatment of biliary dyskinesia. The patient was counseled regarding the benefits of the operation, namely, removal of gallbladder and alleviation of her symptoms. The patient was made aware of the alternatives, including operative and non-operative management. The risks of bleeding, infection, damage to surrounding structures, need for reoperation, need for open operation, bile duct injury, stroke, MI, and death were discussed with the patient. The patient was given a chance to ask questions, and all her questions were answered. Discussion was undertaken with Layman's terms. The patient and family demonstrated adequate understanding, seemed pleased with the plan, and wish to proceed. Consent was given in clear state of mind.  Consent was signed.     Findings: scarred gallbladder.    Procedure in detail: The patient was brought to the operating room and was placed in the supine position where general endotracheal anesthesia was induced. SCDs were in place and functioning. Preoperative antibiotics of ancef were given before incision time. The patient's abdomen was prepped and draped in the usual  sterile fashion.    After infiltration of local anesthetic, a skin incision was made to accommodate a 5 mm port infra-umbilically. We then used the Veress needle to access the peritoneum, and after saline drop test, we insufflated to 15 mmHg. We then placed the 5mm 30 degree camera and inspected the abdomen for evidence of trauma secondary to Veress needle or port placement, and found none.    Utilizing the 30-degree laparoscope with the patient in the reverse Trendelenburg position, and after infiltration of local anesthetic, an additional 11-mm port was inserted into the epigastrium just to the right of the midline. Then two 5-mm ports were inserted in the midclavicular and anterior axillary lines, in the right upper quadrant, all under direct visualization.    The gallbladder was identified, it appeared scarred and chronically inflamed. The gallbladder was retracted cephalad and caudally using gallbladder graspers. Using the [place], we were able to peel the overlying peritoneum off the cystic duct, and circumferentially dissect it. We used electrocautery to futher remove the peritoneum off the gallbladder. We then were able to further dissect Calot's triangle until we identified the cystic artery, which was circumferrentially dissected.     We then doubly clipped the cystic duct distally and divided it. We then doubly clipped the cystic artery  and divided it.Then, using electrocautery, we removed the gallbladder from the liver bed. After ensuring gallbladder bed hemostasis with cautery, we removed the gallbladder and placed it in an endocatch bag, and removed it from the epigastric port site.    The right upper quadrant was irrigated copiously with normal saline solution. We inspected the cystic duct and artery stumps, and found them to be intact. The liver bed was hemostatic.    The trocars were removed under direct visualization.    The fascia on the all port sites >10 mm were closed with Vicryl sutures. The  skin of all incisions was closed with running subcuticular suture.    All sponge, needle, and instrument counts were reported as correct at the end of the procedure. The patient tolerated the procedure well and left the operating room for the recovery room in stable and satisfactory condition.    Estimated Blood Loss: minimal     Specimens: gallbladder for permanent pathology    Complications: none apparent     Drains: none     Disposition: stable, extubated, to PACU    Shraddha Moss M.D.  [facility]  [phone]

## 2022-03-25 NOTE — ANESTHESIA PROCEDURE NOTES
Airway    Date/Time: 3/25/2022 8:12 AM  Performed by: Vanessa Lopez M.D.  Authorized by: Vanessa Lopez M.D.     Location:  OR  Urgency:  Elective  Indications for Airway Management:  Anesthesia      Spontaneous Ventilation: absent    Sedation Level:  Deep  Preoxygenated: Yes    Patient Position:  Sniffing  Mask Difficulty Assessment:  1 - vent by mask  Final Airway Type:  Endotracheal airway  Final Endotracheal Airway:  ETT  Cuffed: Yes    Technique Used for Successful ETT Placement:  Direct laryngoscopy    Insertion Site:  Oral  Blade Type:  Dudley  Laryngoscope Blade/Videolaryngoscope Blade Size:  3  ETT Size (mm):  7.0  Measured from:  Lips  ETT to Lips (cm):  20  Placement Verified by: capnometry    Cormack-Lehane Classification:  Grade I - full view of glottis  Number of Attempts at Approach:  1

## 2022-03-25 NOTE — ANESTHESIA PREPROCEDURE EVALUATION
Case: [id number] Date/Time: 03/25/22 0815    Procedure: CHOLECYSTECTOMY, LAPAROSCOPIC    Pre-op diagnosis: BILIARY DYSKINESIA    Location: [facility] ROOM 23 / SURGERY SAME DAY [facility]    Surgeons: Shraddha Moss M.D.      46yoF with PMHx of Bipoolar 2 do, fibromyalgia, asthma,     Insomnia    NKDA  NPO  No AC  +tobacco        Relevant Problems   No relevant active problems       Physical Exam    Airway   Mallampati: II       Cardiovascular - normal exam     Dental - normal exam           Pulmonary - normal exam     Abdominal - normal exam     Neurological - normal exam                 Anesthesia Plan    ASA 3   ASA physical status 3 criteria: other (comment)    Plan - general       Airway plan will be ETT          Induction: intravenous    Postoperative Plan: Postoperative administration of opioids is intended.    Pertinent diagnostic labs and testing reviewed    Informed Consent:    Anesthetic plan and risks discussed with patient.

## 2022-03-25 NOTE — ANESTHESIA TIME REPORT
Anesthesia Start and Stop Event Times     Date Time Event    3/25/2022 0804 Ready for Procedure     0808 Anesthesia Start     0845 Anesthesia Stop        Responsible Staff  03/25/22    Name Role Begin End    Vanessa Lopez M.D. Anesth 0808 0845        Preop Diagnosis (Free Text):  Pre-op Diagnosis     BILIARY DYSKINESIA        Preop Diagnosis (Codes):    Premium Reason  Non-Premium    Comments:

## 2022-03-25 NOTE — ANESTHESIA POSTPROCEDURE EVALUATION
Patient: Reanna Ojeda    Procedure Summary     Date: 03/25/22 Room / Location: [facility] ROOM 23 / SURGERY SAME DAY [facility]    Anesthesia Start: 0808 Anesthesia Stop: 0845    Procedure: CHOLECYSTECTOMY, LAPAROSCOPIC (N/A Abdomen) Diagnosis: (BILIARY DYSKINESIA)    Surgeons: Shraddha Moss M.D. Responsible Provider: Vanessa Lopez M.D.    Anesthesia Type: general ASA Status: 3          Final Anesthesia Type: general  Last vitals  BP   Blood Pressure: 138/71    Temp   36.1 °C (97 °F)    Pulse   76   Resp   16    SpO2   100 %      Anesthesia Post Evaluation    Patient location during evaluation: PACU  Patient participation: complete - patient participated  Level of consciousness: awake and alert  Pain score: 1    Airway patency: patent  Anesthetic complications: no  Cardiovascular status: adequate and hemodynamically stable  Respiratory status: acceptable  Hydration status: acceptable    PONV: none          No complications documented.     Nurse Pain Score: 1 (NPRS)

## 2022-03-25 NOTE — DISCHARGE INSTRUCTIONS
ACTIVITY: Rest and take it easy for the first 24 hours.  A responsible adult is recommended to remain with you during that time.  It is normal to feel sleepy.  We encourage you to not do anything that requires balance, judgment or coordination.    MILD FLU-LIKE SYMPTOMS ARE NORMAL. YOU MAY EXPERIENCE GENERALIZED MUSCLE ACHES, THROAT IRRITATION, HEADACHE AND/OR SOME NAUSEA.    FOR 24 HOURS DO NOT:  Drive, operate machinery or run household appliances.  Drink beer or alcoholic beverages.   Make important decisions or sign legal documents.    SPECIAL INSTRUCTIONS:   Laparoscopic Cholecystectomy D/C instructions:     1. DIET: Upon discharge from the hospital you may resume your normal preoperative diet. Depending on how you are feeling and whether you have nausea or not, you may wish to stay with a bland diet for the first few days. However, you can advance this as quickly as you feel ready.     2. ACTIVITIES: After discharge from the hospital, you may resume full routine activities. However, there should be no heavy lifting (greater than 15 pounds) and no strenuous activities until after your follow-up visit. Otherwise, routine activities of daily living are acceptable.     3. DRIVING: You may drive whenever you are off pain medications and are able to perform the activities needed to drive, i.e. turning, bending, twisting, etc.     4. BATHING: You may get the wound wet at any time after leaving the hospital. You may shower, but do not submerge in a bath for at least a week.      5. BOWEL FUNCTION: A few patients, after this operation, will develop either frequent or loose stools after meals. This usually corrects itself after a few days, to a few weeks. If this occurs, do not worry; it is not unusual and will resolve. Much more common than loose stools, is constipation. The combination of pain medication and decreased activity level can cause constipation in otherwise normal patients. If you feel this is occurring,  take a laxative (Milk of Magnesia, Ex-Lax, Senokot, etc.) until the problem has resolved.     6. PAIN MEDICATION: You will be given a prescription for pain medication at discharge. Please take these as directed. It is important to remember not to take medications on an empty stomach as this may cause nausea.      It is also helpful to take other non-narcotic over the counter medications to help with pain control and to decrease the need for narcotic medications. I recommend ibuprofen, taken every 8 hours, dosing as directed on the medication bottle.     It is useful to slowly decrease the number of narcotic pain medications you take starting the first day after surgery, as you are able. If you need a refill, Dr. Moss's office will provide you with one refill at your post-operative visit. After this, no more narcotic pain medications will be given.      7.CALL IF YOU HAVE: (1) Fevers to more than 1010 F, (2) Unusual chest or leg pain, (3) Drainage or fluid from incision that may be foul smelling, increased tenderness or soreness at the wound or the wound edges are no longer together, redness or swelling at the incision site. Please do not hesitate to call with any other questions.      8. APPOINTMENT: Contact our office at [phone] for a follow-up appointment in 1 to 2 weeks following your procedure.     If you have any additional questions, please do not hesitate to call the office and speak to either myself or the physician on call.     Office address:  [address] [place] [address] [address] [place] [address], [place] [address]     Shraddha Moss M.D.  [facility]  [phone]                       Routing History                        DIET: To avoid nausea, slowly advance diet as tolerated, avoiding spicy or greasy foods for the first day.  Add more substantial food to your diet according to your physician's instructions.  Babies can be fed formula or breast milk as soon as they are hungry.  INCREASE FLUIDS AND FIBER TO AVOID  CONSTIPATION.    SURGICAL DRESSING/BATHING: You may shower tomorrow, but avoid tubs, hot tubs, and pools until your doctor says it's okay.     FOLLOW-UP APPOINTMENT:  A follow-up appointment should be arranged with Dr. Moss; call [phone] to schedule.    You should CALL YOUR PHYSICIAN if you develop:  Fever greater than 101 degrees F.  Pain not relieved by medication, or persistent nausea or vomiting.  Excessive bleeding (blood soaking through dressing) or unexpected drainage from the wound.  Extreme redness or swelling around the incision site, drainage of pus or foul smelling drainage.  Inability to urinate or empty your bladder within 8 hours.  Problems with breathing or chest pain.    You should call [phone] if you develop problems with breathing or chest pain.  If you are unable to contact your doctor or surgical center, you should go to the nearest emergency room or urgent care center.  Physician's telephone #: [phone]    If any questions arise, call your doctor.  If your doctor is not available, please feel free to call the Surgical Center at [phone].     A registered nurse may call you a few days after your surgery to see how you are doing after your procedure.    MEDICATIONS: Resume taking daily medication.  Take prescribed pain medication with food.  If no medication is prescribed, you may take non-aspirin pain medication if needed.  PAIN MEDICATION CAN BE VERY CONSTIPATING.  Take a stool softener or laxative such as senokot, pericolace, or milk of magnesia if needed.    Prescription given for HYDROcodone-acetaminophen (NORCO) 5-325 MG Tab. Last pain medication given at .  8:00 AM    If your physician has prescribed pain medication that includes Acetaminophen (Tylenol), do not take additional Acetaminophen (Tylenol) while taking the prescribed medication.    Depression / Suicide Risk    As you are discharged from this [facility] facility, it is important to learn how to keep safe from  harming yourself.    Recognize the warning signs:  · Abrupt changes in personality, positive or negative- including increase in energy   · Giving away possessions  · Change in eating patterns- significant weight changes-  positive or negative  · Change in sleeping patterns- unable to sleep or sleeping all the time   · Unwillingness or inability to communicate  · Depression  · Unusual sadness, discouragement and loneliness  · Talk of wanting to die  · Neglect of personal appearance   · Rebelliousness- reckless behavior  · Withdrawal from people/activities they love  · Confusion- inability to concentrate     If you or a loved one observes any of these behaviors or has concerns about self-harm, here's what you can do:  · Talk about it- your feelings and reasons for harming yourself  · Remove any means that you might use to hurt yourself (examples: pills, rope, extension cords, firearm)  · Get professional help from the community (Mental Health, Substance Abuse, psychological counseling)  · Do not be alone:Call your Safe Contact- someone whom you trust who will be there for you.  · Call your local CRISIS HOTLINE 490-2879 or 065-853-2066  · Call your local Children's Mobile Crisis Response Team [place] (847) 063-8393 or www.SQMOS  · Call the toll free National Suicide Prevention Hotlines   · National Suicide Prevention Lifeline 511-649-VNVR (9268)  · National Hope Line Network 800-SUICIDE (595-3222)

## 2022-05-02 ENCOUNTER — HOSPITAL ENCOUNTER (OUTPATIENT)
Dept: PULMONOLOGY | Facility: MEDICAL CENTER | Age: 47
End: 2022-05-02
Attending: FAMILY MEDICINE
Payer: COMMERCIAL

## 2022-05-02 PROCEDURE — 94726 PLETHYSMOGRAPHY LUNG VOLUMES: CPT

## 2022-05-02 PROCEDURE — 99999 PR NO CHARGE: CPT | Performed by: FAMILY MEDICINE

## 2022-05-02 PROCEDURE — 94729 DIFFUSING CAPACITY: CPT

## 2022-05-02 PROCEDURE — 94060 EVALUATION OF WHEEZING: CPT

## 2022-05-02 RX ADMIN — Medication 2.5 MG: at 11:46

## 2022-05-02 ASSESSMENT — PULMONARY FUNCTION TESTS
FVC_PERCENT_PREDICTED: 100
FEV1/FVC_PERCENT_PREDICTED: 110
FEV1/FVC_PERCENT_PREDICTED: 98
FVC: 3.8
FEV1/FVC: 78.42
FEV1_PERCENT_CHANGE: 14
FEV1_PREDICTED: 3.03
FEV1/FVC_PERCENT_PREDICTED: 97
FEV1/FVC_PERCENT_PREDICTED: 111
FEV1/FVC: 90
FVC: 3.31
FEV1/FVC_PERCENT_PREDICTED: 80
FVC_LLN: 3.16
FEV1/FVC: 78.42
FEV1_PERCENT_PREDICTED: 98
FEV1_LLN: 2.53
FVC_LLN: 3.16
FEV1_PERCENT_CHANGE: 0
FEV1/FVC_PERCENT_CHANGE: -12
FVC_PREDICTED: 3.78
FVC_PERCENT_PREDICTED: 87
FEV1/FVC_PERCENT_CHANGE: 0
FEV1_LLN: 2.53
FEV1/FVC_PERCENT_LLN: 67.39
FEV1/FVC_PREDICTED: 80.71
FEV1: 2.97
FEV1/FVC: 89.54
FEV1_PERCENT_PREDICTED: 97
FEV1/FVC_PERCENT_LLN: 67.39
FEV1: 2.98

## 2022-05-02 NOTE — PROCEDURES
DATE OF SERVICE:  05/02/2022     PULMONARY FUNCTION TEST INTERPRETATION     REQUESTING PROVIDER:.  Rosalva Garcia MD     REASON FOR REQUEST:  Abnormal CT of chest.  COVID 2021.     INTERPRETATION:  1.  Acceptable and reproducible.  2.  FEV1 2.97 liters (97%), FVC 3.31 liters (87%), ratio 89%.  3.  Flow volume loops normal appearing.  4.  TLC 5.91 liters (110%).  5.  DLCO corrected for hemoglobin 23.67 mL per minute mmHg (103%).     IMPRESSION:  Normal spirometry with response to bronchodilator, forced vital   capacity improved from 3.31 liters to 3.8 liters with a 14% change.  Normal   total lung capacity with evidence of air trapping and normal gas transfer.    This suggests reactive airway disease.  Clinically correlate.        ______________________________  MD RYAN Green/PADDY    DD:  05/02/2022 13:12  DT:  05/02/2022 13:49    Job#:  [id number]    CC:Rosalva Garcia MD

## 2022-05-10 ENCOUNTER — OFFICE VISIT (OUTPATIENT)
Dept: MEDICAL GROUP | Facility: MEDICAL CENTER | Age: 47
End: 2022-05-10
Payer: COMMERCIAL

## 2022-05-10 VITALS
HEART RATE: 90 BPM | TEMPERATURE: 97.8 F | BODY MASS INDEX: 17.84 KG/M2 | SYSTOLIC BLOOD PRESSURE: 108 MMHG | HEIGHT: 66 IN | DIASTOLIC BLOOD PRESSURE: 66 MMHG | WEIGHT: 111 LBS | RESPIRATION RATE: 16 BRPM | OXYGEN SATURATION: 99 %

## 2022-05-10 DIAGNOSIS — R94.2 ABNORMAL FINDING ON PULMONARY FUNCTION TESTING: ICD-10-CM

## 2022-05-10 DIAGNOSIS — R63.4 UNEXPLAINED WEIGHT LOSS: ICD-10-CM

## 2022-05-10 DIAGNOSIS — M54.2 NECK PAIN: ICD-10-CM

## 2022-05-10 PROBLEM — M53.3 COCCYDYNIA: Status: RESOLVED | Noted: 2019-10-23 | Resolved: 2022-05-10

## 2022-05-10 PROBLEM — L81.9 ATYPICAL PIGMENTED SKIN LESION: Status: RESOLVED | Noted: 2019-10-09 | Resolved: 2022-05-10

## 2022-05-10 PROBLEM — E83.19 IRON OVERLOAD: Status: RESOLVED | Noted: 2022-01-04 | Resolved: 2022-05-10

## 2022-05-10 PROBLEM — N63.0 BREAST LUMP IN FEMALE: Status: RESOLVED | Noted: 2018-08-15 | Resolved: 2022-05-10

## 2022-05-10 PROBLEM — Z00.00 ANNUAL PHYSICAL EXAM: Status: RESOLVED | Noted: 2019-08-26 | Resolved: 2022-05-10

## 2022-05-10 PROBLEM — K59.00 CONSTIPATION: Status: RESOLVED | Noted: 2022-01-04 | Resolved: 2022-05-10

## 2022-05-10 PROBLEM — D75.89 MACROCYTOSIS: Status: RESOLVED | Noted: 2022-01-04 | Resolved: 2022-05-10

## 2022-05-10 PROCEDURE — 99213 OFFICE O/P EST LOW 20 MIN: CPT | Performed by: FAMILY MEDICINE

## 2022-05-10 ASSESSMENT — FIBROSIS 4 INDEX: FIB4 SCORE: 0.57

## 2022-05-10 NOTE — PROGRESS NOTES
This medical record contains text that has been entered with the assistance of computer voice recognition and dictation software.  Therefore, it may contain unintended errors in text, spelling, punctuation, or grammar        Chief Complaint   Patient presents with   • Other     Pft results       Reanna Ojeda is a 46 y.o. female here evaluation and management of:       Current Outpatient Medications   Medication Sig Dispense Refill   • norgestrel-ethinyl estradiol (ELINEST) 0.3-30 MG-MCG Tab TAKE ONE TABLET BY MOUTH ONCE DAILY 84 Tablet 3   • albuterol 108 (90 Base) MCG/ACT Aero Soln inhalation aerosol Inhale 2 Puffs every 6 hours as needed for Shortness of Breath. 8.5 g 0   • sertraline (ZOLOFT) 25 MG tablet Take 25 mg by mouth every day.     • lamotrigine (LAMICTAL) 200 MG tablet Take 400 mg by mouth every evening.     • traZODone (DESYREL) 100 MG Tab Take 100 mg by mouth every evening.       No current facility-administered medications for this visit.     Patient Active Problem List    Diagnosis Date Noted   • Abnormal finding on pulmonary function testing 05/10/2022   • Unexplained weight loss 11/02/2021   • Spotting 02/05/2020   • Neck pain 01/06/2020   • Stress due to illness of family member 10/28/2019   • Neoplasm of uncertain behavior of skin 10/23/2019   • Perimenopause 08/26/2019   • Encounter for surveillance of contraceptive pills 08/15/2018   • Primary insomnia 08/15/2018   • Bipolar 2 disorder (HCC) 08/15/2018   • Preventative health care 08/15/2018     Past Surgical History:   Procedure Laterality Date   • GERMAN BY LAPAROSCOPY N/A 3/25/2022    Procedure: CHOLECYSTECTOMY, LAPAROSCOPIC;  Surgeon: Shraddha Moss M.D.;  Location: SURGERY SAME DAY [facility];  Service: General   • CERVICAL DISK AND FUSION ANTERIOR  5/22/2020    Procedure: DISCECTOMY, SPINE, CERVICAL, ANTERIOR APPROACH, WITH FUSION-FOR ANTERIOR C5-6 ARTHROPLASY;  Surgeon: Jonel Ojeda M.D.;  Location: SURGERY [facility];   "Service: Neurosurgery   • LUMPECTOMY Left 09/18/2019   • BREAST BIOPSY Left 9/28/2018    Procedure: BREAST BIOPSY- FOR EXCISION OF BREAST MASS;  Surgeon: Shraddha Moss M.D.;  Location: SURGERY SAME DAY [facility];  Service: General      Social History     Tobacco Use   • Smoking status: Current Every Day Smoker     Packs/day: 0.50     Years: 33.00     Pack years: 16.50     Types: Cigarettes     Last attempt to quit: 4/16/2019     Years since quitting: 3.0   • Smokeless tobacco: Never Used   • Tobacco comment: Smoking cessation   Vaping Use   • Vaping Use: Never used   Substance Use Topics   • Alcohol use: No     Comment: Sober x 25 years   • Drug use: No     Comment: Sober x 25 years. Previously used speed.      Family History   Problem Relation Age of Onset   • Hypertension Mother    • Hypertension Father    • Cancer Father         Lung   • No Known Problems Sister            ROS    all review of system completed and negative except for those listed above     Objective:     /66 (BP Location: Right arm, Patient Position: Sitting, BP Cuff Size: Adult)   Pulse 90   Temp 36.6 °C (97.8 °F) (Temporal)   Resp 16   Ht 1.676 m (5' 6\")   Wt 50.3 kg (111 lb)   SpO2 99%  Body mass index is 17.92 kg/m².  Physical Exam:        GEN: comfortable, alert and oriented, well nourished, well developed, in no apparent distress   HEENT: NCAT, eyes: pupils equal and reactive, sclera white, EOMIT, good dentition  HEART: limbs warm and well perfused, regular rate, no JVD, no lower extremity edema  LUNGS: speaking in full sentences, not in apparent respiratory distress, no audible wheezes  MSK: normal tone and bulk, no swelling of the joints, gait steady and normal           Assessment and Plan:   The following treatment plan was discussed        Problem List Items Addressed This Visit     Neck pain     Has established relationship with dr HURST   Having some radicular symptoms   Would like referral to follow up with him if her " symptoms progress           Relevant Orders    Referral to Spine Surgery    Unexplained weight loss     Had gall bladder removed   Now tolerating food much better feeling large improvement in quality of life             Abnormal finding on pulmonary function testing     Has h/o smoking   Reviewed results   Some air trapping     Plan for pulm consultation            Relevant Orders    Referral to Pulmonary and Sleep Medicine                Instructed to follow up if symptoms worsen or fail to improve, ER/UC precautions discussed as well    Rosalva Garcia MD  [facility], [address]   [place] [address]  Phone: [phone]

## 2022-05-10 NOTE — ASSESSMENT & PLAN NOTE
Had gall bladder removed   Now tolerating food much better feeling large improvement in quality of life

## 2022-05-10 NOTE — ASSESSMENT & PLAN NOTE
Has established relationship with dr HURST   Having some radicular symptoms   Would like referral to follow up with him if her symptoms progress

## 2022-09-25 DIAGNOSIS — Z30.41 ENCOUNTER FOR SURVEILLANCE OF CONTRACEPTIVE PILLS: ICD-10-CM

## 2022-09-26 RX ORDER — NORGESTREL AND ETHINYL ESTRADIOL 0.3-0.03MG
KIT ORAL
Qty: 84 TABLET | Refills: 0 | Status: SHIPPED | OUTPATIENT
Start: 2022-09-26 | End: 2022-12-16

## 2022-09-26 NOTE — TELEPHONE ENCOUNTER
Received request via: Pharmacy    Was the patient seen in the last year in this department? Yes    Does the patient have an active prescription (recently filled or refills available) for medication(s) requested? No    Future Appointments         Provider Department Center    10/11/2022 10:50 AM Cyn Mejia M.D. [facility] Pulmonary Medicine

## 2022-10-11 ENCOUNTER — OFFICE VISIT (OUTPATIENT)
Dept: SLEEP MEDICINE | Facility: MEDICAL CENTER | Age: 47
End: 2022-10-11
Payer: COMMERCIAL

## 2022-10-11 VITALS
HEART RATE: 77 BPM | BODY MASS INDEX: 18.96 KG/M2 | RESPIRATION RATE: 16 BRPM | SYSTOLIC BLOOD PRESSURE: 100 MMHG | OXYGEN SATURATION: 98 % | DIASTOLIC BLOOD PRESSURE: 50 MMHG | WEIGHT: 118 LBS | HEIGHT: 66 IN

## 2022-10-11 DIAGNOSIS — R05.9 COUGH, UNSPECIFIED TYPE: ICD-10-CM

## 2022-10-11 DIAGNOSIS — R91.8 PULMONARY NODULES: ICD-10-CM

## 2022-10-11 DIAGNOSIS — Z86.16 HISTORY OF COVID-19: ICD-10-CM

## 2022-10-11 DIAGNOSIS — Z72.0 TOBACCO USE: ICD-10-CM

## 2022-10-11 PROCEDURE — 99204 OFFICE O/P NEW MOD 45 MIN: CPT | Performed by: INTERNAL MEDICINE

## 2022-10-11 ASSESSMENT — ENCOUNTER SYMPTOMS
PALPITATIONS: 0
TREMORS: 0
VOMITING: 0
SHORTNESS OF BREATH: 0
DIZZINESS: 0
WEIGHT LOSS: 0
FOCAL WEAKNESS: 0
SPEECH CHANGE: 0
PND: 0
FALLS: 0
WEAKNESS: 0
SORE THROAT: 0
FEVER: 0
DOUBLE VISION: 0
COUGH: 1
ORTHOPNEA: 0
DIAPHORESIS: 0
PHOTOPHOBIA: 0
EYE PAIN: 0
SPUTUM PRODUCTION: 0
NECK PAIN: 0
MYALGIAS: 0
NAUSEA: 0
SINUS PAIN: 0
BACK PAIN: 0
CLAUDICATION: 0
DEPRESSION: 0
STRIDOR: 0
ABDOMINAL PAIN: 0
HEMOPTYSIS: 0
DIARRHEA: 0
WHEEZING: 0
BLURRED VISION: 0
CONSTIPATION: 0
HEARTBURN: 0
CHILLS: 0
EYE DISCHARGE: 0
HEADACHES: 0
EYE REDNESS: 0

## 2022-10-11 ASSESSMENT — FIBROSIS 4 INDEX: FIB4 SCORE: 0.59

## 2022-10-11 NOTE — PROGRESS NOTES
"Chief Complaint   Patient presents with    Establish Care     Referral Rosalva Garcia MD DX Abnormal finding on pulmonary function testing    Results     PFT 22, CT Chest Thorax 22        HPI: This patient is a 47 y.o. female presenting for evaluation of cough and mild abnormality on PFT. PMHx is significant for depression/BPD type II and a hx of PSA. She has been sober for 20+ years but continues to smoke tobacco, 1/2 ppd which she has done for roughly 33 years. She has no known occupational or environmental exposure. Family hx is notable for a father who  from complications of lung Ca. The pt had covid 19 in 2019. She did not require hospitalization but was seen in  and suffered from 2 episodes of \"acute bronchitis\" that winter following covid 19. She also has a hx of sub-cm lung nodules first noted on CT abdomen from 2021. Dedicated CT chest from 2022 showed minimal emphysematous changes in apices and stale RLL 4 mm nodule. No other parenchymal lung disease. PFTs from May showed normal air flows however there was mild air trapping with RV of 132% pred and normal DLCO. She did have a BD response based on increase in FVC post BD. Pt tells me she has a chronic cough that improved significantly off tobacco for 3 weeks. No wheezing or SOB. No recurrent episodes of bronchitis.     Past Medical History:   Diagnosis Date    Asthma     Autoimmune disease (HCC)     fibromyalgia    Bipolar 2 disorder (HCC)     depression, anxiety    Cervical pain (neck)     c4-c6    Fibromyalgia        Social History     Socioeconomic History    Marital status:      Spouse name: Not on file    Number of children: Not on file    Years of education: Not on file    Highest education level: Not on file   Occupational History    Not on file   Tobacco Use    Smoking status: Every Day     Packs/day: 0.50     Years: 33.00     Pack years: 16.50     Types: Cigarettes    Smokeless tobacco: Never    Tobacco comments:     " Smoking cessation   Vaping Use    Vaping Use: Never used   Substance and Sexual Activity    Alcohol use: Not Currently     Comment: Sober x 25 years    Drug use: No     Comment: Sober x 25 years. Previously used speed.     Sexual activity: Yes     Partners: Male     Comment:    Other Topics Concern    Not on file   Social History Narrative    She has a 25 year old son. She is  and lives here in town. 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 Last period was 3 weeks ago, and has been getting much lighter.      Social Determinants of Health     Financial Resource Strain: Not on file   Food Insecurity: Not on file   Transportation Needs: Not on file   Physical Activity: Not on file   Stress: Not on file   Social Connections: Not on file   Intimate Partner Violence: Not on file   Housing Stability: Not on file       Family History   Problem Relation Age of Onset    Hypertension Mother     Hypertension Father     Cancer Father         Lung    No Known Problems Sister        Current Outpatient Medications on File Prior to Visit   Medication Sig Dispense Refill    ELINEST 0.3-30 MG-MCG Tab TAKE ONE TABLET BY MOUTH ONCE DAILY 84 Tablet 0    albuterol 108 (90 Base) MCG/ACT Aero Soln inhalation aerosol Inhale 2 Puffs every 6 hours as needed for Shortness of Breath. 8.5 g 0    sertraline (ZOLOFT) 25 MG tablet Take 25 mg by mouth every day.      lamotrigine (LAMICTAL) 200 MG tablet Take 400 mg by mouth every evening.      traZODone (DESYREL) 100 MG Tab Take 100 mg by mouth every evening.       No current facility-administered medications on file prior to visit.       Allergies: Patient has no known allergies.    ROS:   Review of Systems   Constitutional:  Negative for chills, diaphoresis, fever, malaise/fatigue and weight loss.   HENT:  Negative for congestion, ear discharge, ear pain, hearing loss, nosebleeds, sinus pain, sore throat and tinnitus.    Eyes:  Negative  "for blurred vision, double vision, photophobia, pain, discharge and redness.   Respiratory:  Positive for cough. Negative for hemoptysis, sputum production, shortness of breath, wheezing and stridor.    Cardiovascular:  Negative for chest pain, palpitations, orthopnea, claudication, leg swelling and PND.   Gastrointestinal:  Negative for abdominal pain, constipation, diarrhea, heartburn, nausea and vomiting.   Genitourinary:  Negative for dysuria and urgency.   Musculoskeletal:  Negative for back pain, falls, joint pain, myalgias and neck pain.   Skin:  Negative for itching and rash.   Neurological:  Negative for dizziness, tremors, speech change, focal weakness, weakness and headaches.   Endo/Heme/Allergies:  Negative for environmental allergies.   Psychiatric/Behavioral:  Negative for depression.      /50 (BP Location: Right arm, Patient Position: Sitting, BP Cuff Size: Adult)   Pulse 77   Resp 16   Ht 1.676 m (5' 6\")   Wt 53.5 kg (118 lb)   SpO2 98%     Physical Exam:  Physical Exam  Constitutional:       General: She is not in acute distress.     Appearance: Normal appearance. She is well-developed and normal weight.   HENT:      Head: Normocephalic and atraumatic.      Right Ear: External ear normal.      Left Ear: External ear normal.      Nose: Nose normal. No congestion.      Mouth/Throat:      Mouth: Mucous membranes are moist.      Pharynx: Oropharynx is clear. No oropharyngeal exudate.   Eyes:      General: No scleral icterus.     Extraocular Movements: Extraocular movements intact.      Conjunctiva/sclera: Conjunctivae normal.      Pupils: Pupils are equal, round, and reactive to light.   Neck:      Vascular: No JVD.      Trachea: No tracheal deviation.   Cardiovascular:      Rate and Rhythm: Normal rate and regular rhythm.      Heart sounds: Normal heart sounds. No murmur heard.    No friction rub. No gallop.   Pulmonary:      Effort: Pulmonary effort is normal. No accessory muscle usage or " respiratory distress.      Breath sounds: Normal breath sounds. No wheezing or rales.   Abdominal:      General: There is no distension.      Palpations: Abdomen is soft.      Tenderness: There is no abdominal tenderness.   Musculoskeletal:         General: No tenderness or deformity. Normal range of motion.      Cervical back: Normal range of motion and neck supple.      Right lower leg: No edema.      Left lower leg: No edema.   Lymphadenopathy:      Cervical: No cervical adenopathy.   Skin:     General: Skin is warm and dry.      Findings: No rash.      Nails: There is no clubbing.   Neurological:      Mental Status: She is alert and oriented to person, place, and time.      Cranial Nerves: No cranial nerve deficit.      Gait: Gait normal.   Psychiatric:         Behavior: Behavior normal.       PFTs as reviewed by me personally: as per HPI    Imaging as reviewed by me personally: as per hPI    Assessment:  1. Cough, unspecified type        2. History of COVID-19        3. Pulmonary nodules        4. Tobacco use            Plan:  Chronic, mild, intermittent. Sounds as though this improved significantly off tobacco and while her PFTs would support a dx of reactive airways, she does not have significant obstruction that I would recommend controller therapy. I would recommend prn albuterol and tobacco cessation.  Resolved. Sounds as though this may have triggered some RAD after infection given 2 bouts of bronchitis that winter. Overall no e/o sequelae.   No indication for continued follow up unless the pt continues tobacco use in which case lung Ca screening would be recommended at the age of 50 for 20 or greater pk year history  Counseled on complete tobacco cessation; we did discuss pharmacologic therapy but decided against this due to her mental health history.   Return if symptoms worsen or fail to improve.

## 2022-12-16 DIAGNOSIS — Z30.41 ENCOUNTER FOR SURVEILLANCE OF CONTRACEPTIVE PILLS: ICD-10-CM

## 2022-12-16 RX ORDER — NORGESTREL AND ETHINYL ESTRADIOL 0.3-0.03MG
KIT ORAL
Qty: 84 TABLET | Refills: 0 | Status: SHIPPED | OUTPATIENT
Start: 2022-12-16 | End: 2023-03-09

## 2022-12-28 ENCOUNTER — OFFICE VISIT (OUTPATIENT)
Dept: URGENT CARE | Facility: CLINIC | Age: 47
End: 2022-12-28
Payer: COMMERCIAL

## 2022-12-28 VITALS
RESPIRATION RATE: 16 BRPM | HEIGHT: 66 IN | TEMPERATURE: 99 F | WEIGHT: 117 LBS | SYSTOLIC BLOOD PRESSURE: 104 MMHG | DIASTOLIC BLOOD PRESSURE: 62 MMHG | OXYGEN SATURATION: 100 % | HEART RATE: 81 BPM | BODY MASS INDEX: 18.8 KG/M2

## 2022-12-28 DIAGNOSIS — R05.2 SUBACUTE COUGH: ICD-10-CM

## 2022-12-28 DIAGNOSIS — J22 LRTI (LOWER RESPIRATORY TRACT INFECTION): ICD-10-CM

## 2022-12-28 PROCEDURE — 99213 OFFICE O/P EST LOW 20 MIN: CPT | Performed by: PHYSICIAN ASSISTANT

## 2022-12-28 RX ORDER — DOXYCYCLINE HYCLATE 100 MG
100 TABLET ORAL 2 TIMES DAILY
Qty: 14 TABLET | Refills: 0 | Status: SHIPPED | OUTPATIENT
Start: 2022-12-28 | End: 2023-01-04

## 2022-12-28 RX ORDER — ALBUTEROL SULFATE 90 UG/1
2 AEROSOL, METERED RESPIRATORY (INHALATION) EVERY 6 HOURS PRN
Qty: 8.5 G | Refills: 0 | Status: SHIPPED
Start: 2022-12-28 | End: 2023-08-04

## 2022-12-28 ASSESSMENT — ENCOUNTER SYMPTOMS
FEVER: 0
PALPITATIONS: 0
DIAPHORESIS: 0
SINUS PAIN: 0
VOMITING: 0
NAUSEA: 0
DIARRHEA: 0
ABDOMINAL PAIN: 0
WHEEZING: 0
DIZZINESS: 0
MYALGIAS: 0
SHORTNESS OF BREATH: 0
CHILLS: 0
SORE THROAT: 0
COUGH: 1
HEADACHES: 0
SPUTUM PRODUCTION: 1

## 2022-12-28 ASSESSMENT — FIBROSIS 4 INDEX: FIB4 SCORE: 0.59

## 2022-12-28 NOTE — PROGRESS NOTES
Subjective:     CHIEF COMPLAINT  Chief Complaint   Patient presents with    URI     Sx 3 wks , bronchitis        HPI  Reanna Ojeda is a very pleasant 47 y.o. female who presents to the clinic with cough and chest congestion x3 weeks.  Patient states she has a history of bronchitis and this feels similar.  States she gets bronchitis 1-2 times per year over the last 3 years.  Cough is frequent and fluctuates between being dry and productive.  No recent fevers.  No body aches or chills.  No sinus congestion, sore throat or otalgia.  No known ill contacts.  No history of asthma or COPD.  She has a daily smoker.  Has been taking various over-the-counter cough and cold medications without relief.    REVIEW OF SYSTEMS  Review of Systems   Constitutional:  Negative for chills, diaphoresis, fever and malaise/fatigue.   HENT:  Negative for congestion, ear pain, sinus pain and sore throat.    Respiratory:  Positive for cough and sputum production. Negative for shortness of breath and wheezing.    Cardiovascular:  Negative for chest pain and palpitations.   Gastrointestinal:  Negative for abdominal pain, diarrhea, nausea and vomiting.   Musculoskeletal:  Negative for myalgias.   Neurological:  Negative for dizziness and headaches.   Endo/Heme/Allergies:  Negative for environmental allergies.     PAST MEDICAL HISTORY  Patient Active Problem List    Diagnosis Date Noted    Abnormal finding on pulmonary function testing 05/10/2022    Unexplained weight loss 11/02/2021    Spotting 02/05/2020    Neck pain 01/06/2020    Stress due to illness of family member 10/28/2019    Neoplasm of uncertain behavior of skin 10/23/2019    Perimenopause 08/26/2019    Encounter for surveillance of contraceptive pills 08/15/2018    Primary insomnia 08/15/2018    Bipolar 2 disorder (HCC) 08/15/2018    Preventative health care 08/15/2018       SURGICAL HISTORY   has a past surgical history that includes breast biopsy (Left, 9/28/2018); lumpectomy  "(Left, 09/18/2019); cervical disk and fusion anterior (5/22/2020); and lucho by laparoscopy (N/A, 3/25/2022).    ALLERGIES  No Known Allergies    CURRENT MEDICATIONS  Home Medications       Reviewed by Hi Buitrago P.A.-C. (Physician Assistant) on 12/28/22 at 1450  Med List Status: <None>     Medication Last Dose Status   albuterol 108 (90 Base) MCG/ACT Aero Soln inhalation aerosol Not Taking Active   ELINEST 0.3-30 MG-MCG Tab  Active   lamotrigine (LAMICTAL) 200 MG tablet Taking Active   sertraline (ZOLOFT) 25 MG tablet Taking Active   traZODone (DESYREL) 100 MG Tab Taking Active                    SOCIAL HISTORY  Social History     Tobacco Use    Smoking status: Every Day     Packs/day: 0.50     Years: 33.00     Pack years: 16.50     Types: Cigarettes    Smokeless tobacco: Never    Tobacco comments:     Smoking cessation   Vaping Use    Vaping Use: Never used   Substance and Sexual Activity    Alcohol use: Not Currently     Comment: Sober x 25 years    Drug use: No     Comment: Sober x 25 years. Previously used speed.     Sexual activity: Yes     Partners: Male     Comment:        FAMILY HISTORY  Family History   Problem Relation Age of Onset    Hypertension Mother     Hypertension Father     Cancer Father         Lung    No Known Problems Sister           Objective:     VITAL SIGNS: /62 (BP Location: Left arm, Patient Position: Sitting, BP Cuff Size: Adult long)   Pulse 81   Temp 37.2 °C (99 °F) (Temporal)   Resp 16   Ht 1.676 m (5' 6\")   Wt 53.1 kg (117 lb)   SpO2 100%   BMI 18.88 kg/m²     PHYSICAL EXAM  Physical Exam  Constitutional:       General: She is not in acute distress.     Appearance: Normal appearance. She is not ill-appearing, toxic-appearing or diaphoretic.   HENT:      Head: Normocephalic and atraumatic.      Right Ear: Tympanic membrane, ear canal and external ear normal.      Left Ear: Tympanic membrane, ear canal and external ear normal.      Nose: Nose normal. No " congestion or rhinorrhea.      Mouth/Throat:      Mouth: Mucous membranes are moist.      Pharynx: No oropharyngeal exudate or posterior oropharyngeal erythema.   Eyes:      Conjunctiva/sclera: Conjunctivae normal.   Cardiovascular:      Rate and Rhythm: Normal rate and regular rhythm.      Pulses: Normal pulses.      Heart sounds: Normal heart sounds.   Pulmonary:      Effort: Pulmonary effort is normal.      Breath sounds: Normal breath sounds. No wheezing, rhonchi or rales.   Musculoskeletal:      Cervical back: Normal range of motion. No muscular tenderness.   Lymphadenopathy:      Cervical: No cervical adenopathy.   Skin:     General: Skin is warm and dry.      Capillary Refill: Capillary refill takes less than 2 seconds.   Neurological:      Mental Status: She is alert.   Psychiatric:         Mood and Affect: Mood normal.         Thought Content: Thought content normal.       Assessment/Plan:     1. LRTI (lower respiratory tract infection)  - doxycycline (VIBRAMYCIN) 100 MG Tab; Take 1 Tablet by mouth 2 times a day for 7 days.  Dispense: 14 Tablet; Refill: 0  - albuterol 108 (90 Base) MCG/ACT Aero Soln inhalation aerosol; Inhale 2 Puffs every 6 hours as needed for Shortness of Breath.  Dispense: 8.5 g; Refill: 0    2. Subacute cough  - albuterol 108 (90 Base) MCG/ACT Aero Soln inhalation aerosol; Inhale 2 Puffs every 6 hours as needed for Shortness of Breath.  Dispense: 8.5 g; Refill: 0      MDM/Comments:    Symptomatic care.  -Oral hydration and rest.   -Cough control: nonpharmacologic options for cough relief such as throat lozenges, hot tea, honey.  -Over the counter expectorant as directed; Guaifenesin (Mucinex).  -Tylenol or ibuprofen for pain and fever as directed.   -Albuterol inhaler as directed.    Follow up with PCP. Follow up for increased or persistent shortness of breath or wheezing, fevers, elevated heart rate, weakness, prolonged cough, chest pain, or any other concerns.      Differential  diagnosis, natural history, supportive care, and indications for immediate follow-up discussed. All questions answered. Patient agrees with the plan of care.    Follow-up as needed if symptoms worsen or fail to improve to PCP, Urgent care or Emergency Room.    I have personally reviewed prior external notes and test results pertinent to today's visit.  I have independently reviewed and interpreted all diagnostics ordered during this urgent care acute visit.   Discussed management options (risks,benefits, and alternatives to treatment). Pt expresses understanding and the treatment plan was agreed upon. Questions were encouraged and answered to pt's satisfaction.    Please note that this dictation was created using voice recognition software. I have made a reasonable attempt to correct obvious errors, but I expect that there are errors of grammar and possibly content that I did not discover before finalizing the note.

## 2023-03-09 DIAGNOSIS — Z30.41 ENCOUNTER FOR SURVEILLANCE OF CONTRACEPTIVE PILLS: ICD-10-CM

## 2023-03-09 RX ORDER — NORGESTREL AND ETHINYL ESTRADIOL 0.3-0.03MG
KIT ORAL
Qty: 84 TABLET | Refills: 0 | Status: SHIPPED | OUTPATIENT
Start: 2023-03-09 | End: 2023-06-01

## 2023-05-31 DIAGNOSIS — Z30.41 ENCOUNTER FOR SURVEILLANCE OF CONTRACEPTIVE PILLS: ICD-10-CM

## 2023-06-01 RX ORDER — NORGESTREL AND ETHINYL ESTRADIOL 0.3-0.03MG
KIT ORAL
Qty: 84 TABLET | Refills: 0 | Status: SHIPPED | OUTPATIENT
Start: 2023-06-01 | End: 2023-08-04 | Stop reason: SDUPTHER

## 2023-06-13 ENCOUNTER — HOSPITAL ENCOUNTER (OUTPATIENT)
Dept: RADIOLOGY | Facility: MEDICAL CENTER | Age: 48
End: 2023-06-13
Attending: FAMILY MEDICINE
Payer: COMMERCIAL

## 2023-06-13 DIAGNOSIS — Z12.31 VISIT FOR SCREENING MAMMOGRAM: ICD-10-CM

## 2023-06-13 PROCEDURE — 77063 BREAST TOMOSYNTHESIS BI: CPT

## 2023-08-04 ENCOUNTER — OFFICE VISIT (OUTPATIENT)
Dept: MEDICAL GROUP | Facility: MEDICAL CENTER | Age: 48
End: 2023-08-04
Payer: COMMERCIAL

## 2023-08-04 VITALS
OXYGEN SATURATION: 97 % | WEIGHT: 119 LBS | BODY MASS INDEX: 19.13 KG/M2 | TEMPERATURE: 98.7 F | DIASTOLIC BLOOD PRESSURE: 70 MMHG | HEIGHT: 66 IN | SYSTOLIC BLOOD PRESSURE: 114 MMHG | HEART RATE: 89 BPM

## 2023-08-04 DIAGNOSIS — R92.30 DENSE BREAST TISSUE ON MAMMOGRAM: ICD-10-CM

## 2023-08-04 DIAGNOSIS — Z13.6 SCREENING FOR ISCHEMIC HEART DISEASE: ICD-10-CM

## 2023-08-04 DIAGNOSIS — Z00.00 PREVENTATIVE HEALTH CARE: ICD-10-CM

## 2023-08-04 DIAGNOSIS — Z13.1 SCREENING FOR DIABETES MELLITUS (DM): ICD-10-CM

## 2023-08-04 DIAGNOSIS — Z30.41 ENCOUNTER FOR SURVEILLANCE OF CONTRACEPTIVE PILLS: ICD-10-CM

## 2023-08-04 DIAGNOSIS — N95.1 PERIMENOPAUSE: ICD-10-CM

## 2023-08-04 DIAGNOSIS — F31.81 BIPOLAR 2 DISORDER (HCC): ICD-10-CM

## 2023-08-04 PROCEDURE — 99396 PREV VISIT EST AGE 40-64: CPT | Performed by: FAMILY MEDICINE

## 2023-08-04 PROCEDURE — 3078F DIAST BP <80 MM HG: CPT | Performed by: FAMILY MEDICINE

## 2023-08-04 PROCEDURE — 3074F SYST BP LT 130 MM HG: CPT | Performed by: FAMILY MEDICINE

## 2023-08-04 RX ORDER — NORGESTREL AND ETHINYL ESTRADIOL 0.3-0.03MG
1 KIT ORAL DAILY
Qty: 84 TABLET | Refills: 3 | Status: SHIPPED | OUTPATIENT
Start: 2023-08-04

## 2023-08-04 ASSESSMENT — FIBROSIS 4 INDEX: FIB4 SCORE: 0.6

## 2023-08-04 NOTE — PROGRESS NOTES
This medical record contains text that has been entered with the assistance of computer voice recognition and dictation software.  Therefore, it may contain unintended errors in text, spelling, punctuation, or grammar        Chief Complaint   Patient presents with    Annual Exam       Reanna Ojeda is a 48 y.o. female here evaluation and management of:       Current Outpatient Medications   Medication Sig Dispense Refill    norgestrel-ethinyl estradiol (ELINEST) 0.3-30 MG-MCG Tab Take 1 Tablet by mouth every day. 84 Tablet 3    sertraline (ZOLOFT) 25 MG tablet Take 100 mg by mouth every day.      lamotrigine (LAMICTAL) 200 MG tablet Take 400 mg by mouth every evening.      traZODone (DESYREL) 100 MG Tab Take 100 mg by mouth every evening.       No current facility-administered medications for this visit.     Patient Active Problem List    Diagnosis Date Noted    Dense breast tissue on mammogram 08/04/2023    Abnormal finding on pulmonary function testing 05/10/2022    Unexplained weight loss 11/02/2021    Spotting 02/05/2020    Neck pain 01/06/2020    Stress due to illness of family member 10/28/2019    Neoplasm of uncertain behavior of skin 10/23/2019    Perimenopause 08/26/2019    Encounter for surveillance of contraceptive pills 08/15/2018    Primary insomnia 08/15/2018    Bipolar 2 disorder (HCC) 08/15/2018    Preventative health care 08/15/2018     Past Surgical History:   Procedure Laterality Date    GERMAN BY LAPAROSCOPY N/A 3/25/2022    Procedure: CHOLECYSTECTOMY, LAPAROSCOPIC;  Surgeon: Shraddha Moss M.D.;  Location: SURGERY SAME DAY [facility];  Service: General    CERVICAL DISK AND FUSION ANTERIOR  5/22/2020    Procedure: DISCECTOMY, SPINE, CERVICAL, ANTERIOR APPROACH, WITH FUSION-FOR ANTERIOR C5-6 ARTHROPLASY;  Surgeon: Jonel Ojeda M.D.;  Location: SURGERY [facility];  Service: Neurosurgery    LUMPECTOMY Left 09/18/2019    BREAST BIOPSY Left 9/28/2018    Procedure: BREAST BIOPSY- FOR EXCISION  Unable to fill per protocol as MD authorization required.    Last office visit - 4/28/17  Last refill - 5/22/17 #50 refills-0    Please advise.  Script set to send with approval.     "OF BREAST MASS;  Surgeon: Shraddha Moss M.D.;  Location: SURGERY SAME DAY [facility];  Service: General      Social History     Tobacco Use    Smoking status: Every Day     Packs/day: 0.50     Years: 33.00     Pack years: 16.50     Types: Cigarettes    Smokeless tobacco: Never    Tobacco comments:     Smoking cessation   Vaping Use    Vaping Use: Every day    Substances: Flavoring    Devices: Disposable   Substance Use Topics    Alcohol use: Not Currently     Comment: Sober x 25 years    Drug use: No     Comment: Sober x 25 years. Previously used speed.      Family History   Problem Relation Age of Onset    Hypertension Mother     Hypertension Father     Cancer Father         Lung    No Known Problems Sister            ROS    all review of system completed and negative except for those listed above     Objective:     /70 (BP Location: Left arm, Patient Position: Sitting, BP Cuff Size: Adult long)   Pulse 89   Temp 37.1 °C (98.7 °F) (Temporal)   Ht 1.676 m (5' 6\")   Wt 54 kg (119 lb)   SpO2 97%  Body mass index is 19.21 kg/m².  Physical Exam:    Constitutional: Alert, no distress.  Skin: Warm, dry, good turgor, no rashes in visible areas.  Eye: Equal, round and reactive, conjunctiva clear, lids normal.  ENMT: Lips without lesions, good dentition, oropharynx clear.  Neck: Trachea midline, no masses, no thyromegaly. No cervical or supraclavicular lymphadenopathy.  Respiratory: Unlabored respiratory effort, lungs clear to auscultation, no wheezes, no ronchi.  Cardiovascular: Normal S1, S2, no murmur, no edema.  Abdomen: Soft, non-tender, no masses, no hepatosplenomegaly.  Psych: Alert and oriented x3, normal affect and mood.        Assessment and Plan:   The following treatment plan was discussed        Problem List Items Addressed This Visit       Encounter for surveillance of contraceptive pills    Relevant Medications    norgestrel-ethinyl estradiol (ELINEST) 0.3-30 MG-MCG Tab    Bipolar 2 disorder " ([facility])     Keeps up her relationship with her psychiatrist in [place] 2x per year              Preventative health care     Age appropriate prev health care discussed   Cancer screening discussed   Vaccines discussed   Labs offered   Blood pressure at goal     Anticipatory guidance including SPF and other skin protective measures, dental hygiene and care, regular exercise, diet, stress and family planning advice discussed.             Relevant Orders    Lipid Profile    Basic Metabolic Panel    Perimenopause     We should be comfortable continuing ocp until age 54             Dense breast tissue on mammogram     Needs order for sonocine since her rads report is extremely dense breast tissue on exam              Relevant Orders    US-SCREENING WHOLE BREAST BILATERAL (3D SCREENING)     Other Visit Diagnoses       Screening for diabetes mellitus (DM)        Relevant Orders    Lipid Profile    Basic Metabolic Panel    Screening for ischemic heart disease        Relevant Orders    Lipid Profile    Basic Metabolic Panel                  Instructed to follow up if symptoms worsen or fail to improve, ER/UC precautions discussed as well    Rosalva Garcia MD  [facility], Family Medicine   [address] [place] [address] [address]   [place] [id number]  Phone: [phone]

## 2023-09-20 ENCOUNTER — HOSPITAL ENCOUNTER (OUTPATIENT)
Dept: RADIOLOGY | Facility: MEDICAL CENTER | Age: 48
End: 2023-09-20
Attending: FAMILY MEDICINE
Payer: COMMERCIAL

## 2023-09-20 DIAGNOSIS — R92.30 DENSE BREAST TISSUE ON MAMMOGRAM: ICD-10-CM

## 2023-09-20 PROCEDURE — 76641 ULTRASOUND BREAST COMPLETE: CPT

## 2023-12-22 NOTE — ASSESSMENT & PLAN NOTE
Age appropriate prev health care discussed   Cancer screening discussed   Vaccines discussed   Labs offered   Blood pressure at goal     Anticipatory guidance including SPF and other skin protective measures, dental hygiene and care, regular exercise, diet, stress and family planning advice discussed.       Body Location Override (Optional - Billing Will Still Be Based On Selected Body Map Location If Applicable): right ventral proximal forearm Detail Level: Detailed Add 69824 Cpt? (Important Note: In 2017 The Use Of 58102 Is Being Tracked By Cms To Determine Future Global Period Reimbursement For Global Periods): no

## 2024-06-23 DIAGNOSIS — Z30.41 ENCOUNTER FOR SURVEILLANCE OF CONTRACEPTIVE PILLS: ICD-10-CM

## 2024-06-24 NOTE — TELEPHONE ENCOUNTER
Was the patient seen in the last year in this department? Yes   Does patient have an active prescription for medications requested? No   Received Request Via: Pharmacy  Sent patient a letter to make an appointment

## 2024-06-25 RX ORDER — NORGESTREL AND ETHINYL ESTRADIOL 0.3-0.03MG
1 KIT ORAL DAILY
Qty: 84 TABLET | Refills: 0 | Status: SHIPPED | OUTPATIENT
Start: 2024-06-25

## 2024-08-13 ENCOUNTER — PATIENT MESSAGE (OUTPATIENT)
Dept: HEALTH INFORMATION MANAGEMENT | Facility: OTHER | Age: 49
End: 2024-08-13

## 2024-09-11 ENCOUNTER — TELEPHONE (OUTPATIENT)
Dept: HEALTH INFORMATION MANAGEMENT | Facility: OTHER | Age: 49
End: 2024-09-11

## 2024-10-01 ENCOUNTER — TELEPHONE (OUTPATIENT)
Dept: HEALTH INFORMATION MANAGEMENT | Facility: OTHER | Age: 49
End: 2024-10-01
Payer: COMMERCIAL

## 2024-10-01 ENCOUNTER — PATIENT MESSAGE (OUTPATIENT)
Dept: MEDICAL GROUP | Facility: MEDICAL CENTER | Age: 49
End: 2024-10-01
Payer: COMMERCIAL

## 2024-10-01 DIAGNOSIS — Z30.41 ENCOUNTER FOR SURVEILLANCE OF CONTRACEPTIVE PILLS: ICD-10-CM

## 2024-10-01 RX ORDER — NORGESTREL AND ETHINYL ESTRADIOL 0.3-0.03MG
1 KIT ORAL DAILY
Qty: 84 TABLET | Refills: 0 | Status: SHIPPED | OUTPATIENT
Start: 2024-10-01

## 2024-11-25 ENCOUNTER — TELEPHONE (OUTPATIENT)
Dept: HEALTH INFORMATION MANAGEMENT | Facility: OTHER | Age: 49
End: 2024-11-25
Payer: COMMERCIAL

## 2024-12-21 DIAGNOSIS — Z30.41 ENCOUNTER FOR SURVEILLANCE OF CONTRACEPTIVE PILLS: ICD-10-CM

## 2024-12-23 NOTE — TELEPHONE ENCOUNTER
Was the patient seen in the last year in this department? No    Does patient have an active prescription for medications requested? No   Received Request Via: Pharmacy  Sara coming up

## 2024-12-24 RX ORDER — NORGESTREL AND ETHINYL ESTRADIOL 0.3-0.03MG
1 KIT ORAL DAILY
Qty: 84 TABLET | Refills: 0 | Status: SHIPPED | OUTPATIENT
Start: 2024-12-24 | End: 2024-12-31 | Stop reason: SDUPTHER

## 2024-12-31 ENCOUNTER — OFFICE VISIT (OUTPATIENT)
Dept: MEDICAL GROUP | Facility: MEDICAL CENTER | Age: 49
End: 2024-12-31
Payer: COMMERCIAL

## 2024-12-31 VITALS
TEMPERATURE: 98.3 F | SYSTOLIC BLOOD PRESSURE: 100 MMHG | HEIGHT: 66 IN | BODY MASS INDEX: 19.72 KG/M2 | DIASTOLIC BLOOD PRESSURE: 60 MMHG | HEART RATE: 74 BPM | WEIGHT: 122.69 LBS | RESPIRATION RATE: 16 BRPM | OXYGEN SATURATION: 99 %

## 2024-12-31 DIAGNOSIS — Z30.41 ENCOUNTER FOR SURVEILLANCE OF CONTRACEPTIVE PILLS: ICD-10-CM

## 2024-12-31 DIAGNOSIS — F31.81 BIPOLAR 2 DISORDER (HCC): ICD-10-CM

## 2024-12-31 DIAGNOSIS — Z11.3 SCREEN FOR STD (SEXUALLY TRANSMITTED DISEASE): ICD-10-CM

## 2024-12-31 DIAGNOSIS — Z13.6 SCREENING FOR ISCHEMIC HEART DISEASE: ICD-10-CM

## 2024-12-31 DIAGNOSIS — Z12.11 SCREENING FOR COLON CANCER: ICD-10-CM

## 2024-12-31 DIAGNOSIS — R73.09 ELEVATED GLUCOSE: ICD-10-CM

## 2024-12-31 DIAGNOSIS — N95.1 PERIMENOPAUSE: ICD-10-CM

## 2024-12-31 DIAGNOSIS — Z13.1 SCREENING FOR DIABETES MELLITUS (DM): ICD-10-CM

## 2024-12-31 RX ORDER — NORGESTREL AND ETHINYL ESTRADIOL 0.3-0.03MG
KIT ORAL
Qty: 84 TABLET | Refills: 3 | Status: SHIPPED | OUTPATIENT
Start: 2024-12-31

## 2025-01-01 NOTE — ASSESSMENT & PLAN NOTE
Orders:    norgestrel-ethinyl estradiol (ELINEST) 0.3-30 MG-MCG Tab; Take active pills continuously to suppress menses

## 2025-01-01 NOTE — PROGRESS NOTES
This medical record contains text that has been entered with the assistance of computer voice recognition and dictation software.  Therefore, it may contain unintended errors in text, spelling, punctuation, or grammar        No chief complaint on file.  [facility]     Reanna Ojeda is a 49 y.o. female here evaluation and management of:       History of Present Illness  The patient is a 49-year-old female who presents for evaluation of perimenopausal symptoms and health maintenance.    She has been experiencing alterations in her menstrual cycle for approximately 2 years, indicative of perimenopause. Her menses are now light, lasting about 3 days, and not requiring tampon use. She recalls a previous attempt to suppress menstruation with continuous active birth control pills, which was unsuccessful due to breakthrough bleeding. She is currently on birth control, which she believes alleviates her hot flashes. She takes the medication daily to prevent pregnancy and plans to continue until she reaches the age of 54.    She has declined the administration of any vaccines during this visit. She has not previously utilized the Cologuard test. She has expressed interest in undergoing blood work, although she often forgets to fast prior to the test. She is aware of the increased risk of hepatitis C in individuals over the age of 40. She is under the care of a psychiatrist, Dr. Marcel Casanova, with whom she conducts virtual consultations every 6 months. She has been seeing him for 14 to 15 years.    MEDICATIONS  Current: Eliquis        Current Outpatient Medications   Medication Sig Dispense Refill    norgestrel-ethinyl estradiol (ELINEST) 0.3-30 MG-MCG Tab Take active pills continuously to suppress menses 84 Tablet 3    sertraline (ZOLOFT) 25 MG tablet Take 75 mg by mouth every day.      lamotrigine (LAMICTAL) 200 MG tablet Take 400 mg by mouth every evening.      traZODone (DESYREL) 100 MG Tab Take 100 mg by mouth  every evening.       No current facility-administered medications for this visit.     Patient Active Problem List    Diagnosis Date Noted    Dense breast tissue on mammogram 08/04/2023    Abnormal finding on pulmonary function testing 05/10/2022    Unexplained weight loss 11/02/2021    Spotting 02/05/2020    Neck pain 01/06/2020    Stress due to illness of family member 10/28/2019    Neoplasm of uncertain behavior of skin 10/23/2019    Perimenopause 08/26/2019    Encounter for surveillance of contraceptive pills 08/15/2018    Primary insomnia 08/15/2018    Bipolar 2 disorder (HCC) 08/15/2018    Preventative health care 08/15/2018     Past Surgical History:   Procedure Laterality Date    GERMAN BY LAPAROSCOPY N/A 3/25/2022    Procedure: CHOLECYSTECTOMY, LAPAROSCOPIC;  Surgeon: Shraddha Moss M.D.;  Location: SURGERY SAME DAY [facility];  Service: General    CERVICAL DISK AND FUSION ANTERIOR  5/22/2020    Procedure: DISCECTOMY, SPINE, CERVICAL, ANTERIOR APPROACH, WITH FUSION-FOR ANTERIOR C5-6 ARTHROPLASY;  Surgeon: Jonel Ojeda M.D.;  Location: SURGERY [facility];  Service: Neurosurgery    LUMPECTOMY Left 09/18/2019    BREAST BIOPSY Left 9/28/2018    Procedure: BREAST BIOPSY- FOR EXCISION OF BREAST MASS;  Surgeon: Shraddha Moss M.D.;  Location: SURGERY SAME DAY [facility];  Service: General      Social History     Tobacco Use    Smoking status: Former     Current packs/day: 0.50     Average packs/day: 0.5 packs/day for 33.0 years (16.5 ttl pk-yrs)     Types: Cigarettes    Smokeless tobacco: Never    Tobacco comments:     Smoking cessation   Vaping Use    Vaping status: Every Day    Substances: Flavoring    Devices: Disposable   Substance Use Topics    Alcohol use: Not Currently     Comment: Sober x 25 years    Drug use: No     Comment: Sober x 25 years. Previously used speed.      Family History   Problem Relation Age of Onset    Hypertension Mother     Hypertension Father     Cancer Father         Lung  "   No Known Problems Sister            ROS    all review of system completed and negative except for those listed above     Objective:     /60 (BP Location: Left arm, Patient Position: Sitting, BP Cuff Size: Adult)   Pulse 74   Temp 36.8 °C (98.3 °F) (Temporal)   Resp 16   Ht 1.676 m (5' 6\")   Wt 55.7 kg (122 lb 11 oz)   SpO2 99%  Body mass index is 19.8 kg/m².  Physical Exam:        GEN: comfortable, alert and oriented, well nourished, well developed, in no apparent distress   HEENT: NCAT, eyes: pupils equal and reactive, sclera white, EOMIT, good dentition  HEART: limbs warm and well perfused, regular rate, no JVD, no lower extremity edema  LUNGS: speaking in full sentences, not in apparent respiratory distress, no audible wheezes  MSK: normal tone and bulk, no swelling of the joints, gait steady and normal       Assessment and Plan:   The following treatment plan was discussed        Assessment & Plan  Screening for colon cancer    Orders:    Cologuard® colon cancer screening    Screening for diabetes mellitus (DM)    Orders:    Basic Metabolic Panel; Future    Lipid Profile; Future    Screening for ischemic heart disease    Orders:    Basic Metabolic Panel; Future    Lipid Profile; Future    Elevated glucose    Orders:    HEMOGLOBIN A1C; Future    Screen for STD (sexually transmitted disease)    Orders:    HIV AG/AB COMBO ASSAY SCREENING; Future    HEP C VIRUS ANTIBODY; Future    Encounter for surveillance of contraceptive pills    Orders:    norgestrel-ethinyl estradiol (ELINEST) 0.3-30 MG-MCG Tab; Take active pills continuously to suppress menses    Perimenopause    Orders:    norgestrel-ethinyl estradiol (ELINEST) 0.3-30 MG-MCG Tab; Take active pills continuously to suppress menses    Bipolar 2 disorder (HCC)             Assessment & Plan  1. Perimenopausal symptoms.  She reports changes in her menstrual cycle over the past year and a half, with periods lasting about 3 days and being very light. " She is advised to continue taking the active birth control pills continuously to potentially eliminate her periods. A prescription for additional packs of birth control pills will be sent to her pharmacy.    2. Health maintenance.  She is advised to complete the Cologuard test, which is recommended every 3 years as an alternative to colonoscopy. Orders for blood work, including HIV and hepatitis C tests, have been provided. She is also due for a Pap smear, which was last done in 2021.          Instructed to follow up if symptoms worsen or fail to improve, ER/UC precautions discussed as well    Rosalva Garcia MD  [facility], Family Medicine   [address] [place] [address] [address]   [place] [id number]  Phone: [phone]

## 2025-01-03 ENCOUNTER — TELEMEDICINE (OUTPATIENT)
Dept: MEDICAL GROUP | Facility: MEDICAL CENTER | Age: 50
End: 2025-01-03
Payer: COMMERCIAL

## 2025-01-03 VITALS — BODY MASS INDEX: 19.44 KG/M2 | WEIGHT: 121 LBS | HEIGHT: 66 IN

## 2025-01-03 DIAGNOSIS — N94.10 DYSPAREUNIA IN FEMALE: ICD-10-CM

## 2025-01-03 DIAGNOSIS — R68.82 DECREASED SEX DRIVE: ICD-10-CM

## 2025-01-03 PROCEDURE — 99214 OFFICE O/P EST MOD 30 MIN: CPT | Performed by: FAMILY MEDICINE

## 2025-01-03 RX ORDER — ESTRADIOL 0.1 MG/G
CREAM VAGINAL
Qty: 4 EACH | Refills: 3 | Status: SHIPPED | OUTPATIENT
Start: 2025-01-03

## 2025-01-03 NOTE — ASSESSMENT & PLAN NOTE
Orders:    Estradiol 7.5 MCG/24HR RING; Insert one applicatorful 3x per week at night    estradiol (ESTRACE) 0.1 MG/GM vaginal cream; Insert one applicator 3x per week at night    Referral to Other    Referral to Physical Therapy

## 2025-01-03 NOTE — PROGRESS NOTES
This medical record contains text that has been entered with the assistance of computer voice recognition and dictation software.  Therefore, it may contain unintended errors in text, spelling, punctuation, or grammar        Chief Complaint   Patient presents with    Requesting Labs     Discuss personal topics       Reanna Ojeda is a 49 y.o. female here evaluation and management of:       History of Present Illness  The patient presents via virtual visit for evaluation of vaginal dryness.    She reports experiencing vaginal dryness, which she attributes to the natural aging process. She expresses discomfort with the idea of visiting a sex store for research purposes. She is currently on birth control pills and has previously tried Wellbutrin twice but found it intolerable. She also mentions that her sexual arousal has diminished, even when her  initiates physical contact. Additionally, she notes that her 's recent diagnosis of prostate cancer has added to their emotional stress. She is considering the use of estrogen and is contemplating seeking the assistance of a sex therapist.    ALLERGIES  The patient has no known allergies.    MEDICATIONS  Current: Zoloft, Viagra  Past: Wellbutrin        Current Outpatient Medications   Medication Sig Dispense Refill    Estradiol 7.5 MCG/24HR RING Insert one applicatorful 3x per week at night 100 Each 3    estradiol (ESTRACE) 0.1 MG/GM vaginal cream Insert one applicator 3x per week at night 4 Each 3    norgestrel-ethinyl estradiol (ELINEST) 0.3-30 MG-MCG Tab Take active pills continuously to suppress menses 84 Tablet 3    sertraline (ZOLOFT) 25 MG tablet Take 75 mg by mouth every day.      lamotrigine (LAMICTAL) 200 MG tablet Take 400 mg by mouth every evening.      traZODone (DESYREL) 100 MG Tab Take 100 mg by mouth every evening.       No current facility-administered medications for this visit.     Patient Active Problem List    Diagnosis Date Noted     Decreased sex drive 01/03/2025    Dense breast tissue on mammogram 08/04/2023    Abnormal finding on pulmonary function testing 05/10/2022    Unexplained weight loss 11/02/2021    Spotting 02/05/2020    Neck pain 01/06/2020    Stress due to illness of family member 10/28/2019    Neoplasm of uncertain behavior of skin 10/23/2019    Perimenopause 08/26/2019    Encounter for surveillance of contraceptive pills 08/15/2018    Primary insomnia 08/15/2018    Bipolar 2 disorder (HCC) 08/15/2018    Preventative health care 08/15/2018     Past Surgical History:   Procedure Laterality Date    GERMAN BY LAPAROSCOPY N/A 3/25/2022    Procedure: CHOLECYSTECTOMY, LAPAROSCOPIC;  Surgeon: Shraddha Moss M.D.;  Location: SURGERY SAME DAY [facility];  Service: General    CERVICAL DISK AND FUSION ANTERIOR  5/22/2020    Procedure: DISCECTOMY, SPINE, CERVICAL, ANTERIOR APPROACH, WITH FUSION-FOR ANTERIOR C5-6 ARTHROPLASY;  Surgeon: Jonel Ojeda M.D.;  Location: SURGERY [facility];  Service: Neurosurgery    LUMPECTOMY Left 09/18/2019    BREAST BIOPSY Left 9/28/2018    Procedure: BREAST BIOPSY- FOR EXCISION OF BREAST MASS;  Surgeon: Shraddha Moss M.D.;  Location: SURGERY SAME DAY [facility];  Service: General      Social History     Tobacco Use    Smoking status: Former     Current packs/day: 0.50     Average packs/day: 0.5 packs/day for 33.0 years (16.5 ttl pk-yrs)     Types: Cigarettes    Smokeless tobacco: Never    Tobacco comments:     Smoking cessation   Vaping Use    Vaping status: Every Day    Substances: Flavoring    Devices: Disposable   Substance Use Topics    Alcohol use: Not Currently     Comment: Sober x 25 years    Drug use: No     Comment: Sober x 25 years. Previously used speed.      Family History   Problem Relation Age of Onset    Hypertension Mother     Hypertension Father     Cancer Father         Lung    No Known Problems Sister            ROS    all review of system completed and negative except for those  "listed above     Objective:     Ht 1.676 m (5' 6\")   Wt 54.9 kg (121 lb)  Body mass index is 19.53 kg/m².  Physical Exam:        GEN: comfortable, alert and oriented, well nourished, well developed, in no apparent distress   HEENT: NCAT, eyes: pupils equal and reactive, sclera white, EOMIT, good dentition  HEART: limbs warm and well perfused, regular rate, no JVD, no lower extremity edema  LUNGS: speaking in full sentences, not in apparent respiratory distress, no audible wheezes  MSK: normal tone and bulk, no swelling of the joints, gait steady and normal       Assessment and Plan:   The following treatment plan was discussed        Assessment & Plan  Decreased sex drive    Orders:    Estradiol 7.5 MCG/24HR RING; Insert one applicatorful 3x per week at night    estradiol (ESTRACE) 0.1 MG/GM vaginal cream; Insert one applicator 3x per week at night    Referral to Other    Referral to Physical Therapy    Dyspareunia in female    Orders:    Referral to Physical Therapy        Assessment & Plan  1. Vaginal dryness.  She is currently on birth control pills, which provide exogenous systemic hormones. Hormone replacement therapy is not recommended in addition to these pills. For individuals under 55, birth control pills are preferred due to a lower likelihood of breakthrough bleeding. She is advised to continue taking the active pills continuously to suppress her periods for a couple of months to see if she likes it. To address vaginal dryness, topical estrogen cream will be prescribed. She is instructed to insert one applicator full three times per week at night. The Estring was considered but is likely not covered by her insurance. A referral to Dominique Caballero, a sex therapist, has been made. Pelvic floor physical therapy is also suggested as a future consideration.          Instructed to follow up if symptoms worsen or fail to improve, ER/UC precautions discussed as well    Rosalva Garcia MD  [facility] " [address]   [place] [address]  Phone: [phone]

## 2025-01-28 ENCOUNTER — APPOINTMENT (OUTPATIENT)
Dept: MEDICAL GROUP | Facility: MEDICAL CENTER | Age: 50
End: 2025-01-28
Payer: COMMERCIAL

## 2025-02-06 ENCOUNTER — APPOINTMENT (OUTPATIENT)
Dept: MEDICAL GROUP | Facility: MEDICAL CENTER | Age: 50
End: 2025-02-06
Payer: COMMERCIAL

## 2025-06-01 ENCOUNTER — APPOINTMENT (OUTPATIENT)
Dept: TELEHEALTH | Facility: TELEMEDICINE | Age: 50
End: 2025-06-01
Payer: COMMERCIAL

## 2025-08-31 ENCOUNTER — OFFICE VISIT (OUTPATIENT)
Dept: URGENT CARE | Facility: CLINIC | Age: 50
End: 2025-08-31
Payer: COMMERCIAL

## 2025-08-31 VITALS
HEART RATE: 92 BPM | WEIGHT: 124.8 LBS | HEIGHT: 66 IN | DIASTOLIC BLOOD PRESSURE: 68 MMHG | OXYGEN SATURATION: 99 % | BODY MASS INDEX: 20.06 KG/M2 | SYSTOLIC BLOOD PRESSURE: 104 MMHG | RESPIRATION RATE: 14 BRPM | TEMPERATURE: 98.1 F

## 2025-08-31 DIAGNOSIS — N30.01 ACUTE CYSTITIS WITH HEMATURIA: Primary | ICD-10-CM

## 2025-08-31 PROBLEM — K82.8 BILIARY DYSKINESIA: Status: ACTIVE | Noted: 2022-03-03

## 2025-08-31 PROBLEM — F17.200 TOBACCO DEPENDENCE SYNDROME: Status: ACTIVE | Noted: 2025-08-31

## 2025-08-31 LAB
APPEARANCE UR: NORMAL
BILIRUB UR STRIP-MCNC: NORMAL MG/DL
COLOR UR AUTO: YELLOW
GLUCOSE UR STRIP.AUTO-MCNC: NEGATIVE MG/DL
KETONES UR STRIP.AUTO-MCNC: NORMAL MG/DL
LEUKOCYTE ESTERASE UR QL STRIP.AUTO: NEGATIVE
NITRITE UR QL STRIP.AUTO: NEGATIVE
PH UR STRIP.AUTO: 5.5 [PH] (ref 5–8)
PROT UR QL STRIP: 30 MG/DL
RBC UR QL AUTO: NORMAL
SP GR UR STRIP.AUTO: 1.03
UROBILINOGEN UR STRIP-MCNC: 1 MG/DL

## 2025-08-31 RX ORDER — NITROFURANTOIN 25; 75 MG/1; MG/1
100 CAPSULE ORAL 2 TIMES DAILY
Qty: 10 CAPSULE | Refills: 0 | Status: SHIPPED | OUTPATIENT
Start: 2025-08-31 | End: 2025-09-05

## (undated) DEVICE — LACTATED RINGERS INJ 1000 ML - (14EA/CA 60CA/PF)

## (undated) DEVICE — SUTURE 4-0 MONOCRYL PLUS PS-2 - 27 INCH (36/BX)

## (undated) DEVICE — GLOVE BIOGEL SZ 8 SURGICAL PF LTX - (50PR/BX 4BX/CA)

## (undated) DEVICE — CANISTER SUCTION 3000ML MECHANICAL FILTER AUTO SHUTOFF MEDI-VAC NONSTERILE LF DISP  (40EA/CA)

## (undated) DEVICE — DERMABOND ADVANCED - (12EA/BX)

## (undated) DEVICE — SENSOR SPO2 NEO LNCS ADHESIVE (20/BX) SEE USER NOTES

## (undated) DEVICE — PACK NEURO - (2EA/CA)

## (undated) DEVICE — ELECTRODE DUAL RETURN W/ CORD - (50/PK)

## (undated) DEVICE — MASK ANESTHESIA ADULT  - (100/CA)

## (undated) DEVICE — SET EXTENSION WITH 2 PORTS (48EA/CA) ***PART #2C8610 IS A SUBSTITUTE*****

## (undated) DEVICE — ELECTRODE 850 FOAM ADHESIVE - HYDROGEL RADIOTRNSPRNT (50/PK)

## (undated) DEVICE — TUBE E-T HI-LO CUFF 7.0MM (10EA/PK)

## (undated) DEVICE — GLOVE BIOGEL INDICATOR SZ 6.5 SURGICAL PF LTX - (50PR/BX 4BX/CA)

## (undated) DEVICE — SLEEVE, VASO, THIGH, MED

## (undated) DEVICE — LACTATED RINGERS INJ. 500 ML - (24EA/CA)

## (undated) DEVICE — TROCAR 5X100 BLADED Z-THREAD - KII (6/BX)

## (undated) DEVICE — CANISTER SUCTION RIGID RED 1500CC (40EA/CA)

## (undated) DEVICE — KIT ANESTHESIA W/CIRCUIT & 3/LT BAG W/FILTER (20EA/CA)

## (undated) DEVICE — RESTRAINTS LIMB DISP. - (12/BX 4BX/CA)

## (undated) DEVICE — SUCTION INSTRUMENT YANKAUER BULBOUS TIP W/O VENT (50EA/CA)

## (undated) DEVICE — SYRINGE 30 ML LL (56/BX)

## (undated) DEVICE — SODIUM CHL IRRIGATION 0.9% 1000ML (12EA/CA)

## (undated) DEVICE — TUBE CONNECTING SUCTION - CLEAR PLASTIC STERILE 72 IN (50EA/CA)

## (undated) DEVICE — HEAD HOLDER JUNIOR/ADULT

## (undated) DEVICE — NEPTUNE 4 PORT MANIFOLD - (20/PK)

## (undated) DEVICE — BIT DRILL MATCH HEAD MIDAS REX 15-A 2.2MM X 15CM

## (undated) DEVICE — PROTECTOR ULNA NERVE - (36PR/CA)

## (undated) DEVICE — SCREW DISTRACTION 14MM YELLOW - STERILE (10EA/BX) (5TX4=20)

## (undated) DEVICE — SUTURE 3-0 VICRYL PLUS SH - 8X 18 INCH (12/BX)

## (undated) DEVICE — TUBING C&T SET FLYING LEADS DRAIN TUBING (10EA/BX)

## (undated) DEVICE — DRESSING TRANSPARENT FILM TEGADERM 2.375 X 2.75"  (100EA/BX)"

## (undated) DEVICE — GOWN WARMING STANDARD FLEX - (30/CA)

## (undated) DEVICE — TUBING CLEARLINK DUO-VENT - C-FLO (48EA/CA)

## (undated) DEVICE — SPONGE PEANUT - (5/PK 50PK/CA)

## (undated) DEVICE — TUBE NG SALEM SUMP 16FR (50EA/CA)

## (undated) DEVICE — SUTURE GENERAL

## (undated) DEVICE — SET SUCTION/IRRIGATION WITH DISPOSABLE TIP (6/CA )PART #0250-070-520 IS A SUB

## (undated) DEVICE — SET LEADWIRE 5 LEAD BEDSIDE DISPOSABLE ECG (1SET OF 5/EA)

## (undated) DEVICE — TROCAR Z THREAD 11 X 100 - BLADED (6/BX)

## (undated) DEVICE — GLOVE BIOGEL INDICATOR SZ 8 SURGICAL PF LTX - (50/BX 4BX/CA)

## (undated) DEVICE — DRAIN BLAKE 10FR 3/4 FLUTED SILICONE CLOSED WOUND SUCTION CHANNEL  - (10/CA)

## (undated) DEVICE — WATER IRRIGATION STERILE 1000ML (12EA/CA)

## (undated) DEVICE — SHEET PEDIATRIC LAPAROTOMY - (10/CA)

## (undated) DEVICE — TOWEL STOP TIMEOUT SAFETY FLAG (40EA/CA)

## (undated) DEVICE — SPONGE GAUZESTER. 2X2 4-PL - (2/PK 50PK/BX 30BX/CS)

## (undated) DEVICE — TUBE E-T HI-LO CUFF 6.5MM (10EA/BX)

## (undated) DEVICE — CLOSURE WOUND 1/4 X 4 (STERI - STRIP) (50/BX 4BX/CA)

## (undated) DEVICE — CATHETER IV 20 GA X 1-1/4 ---SURG.& SDS ONLY--- (50EA/BX)

## (undated) DEVICE — CHLORAPREP 26 ML APPLICATOR - ORANGE TINT(25/CA)

## (undated) DEVICE — RESERVOIR SUCTION 100 CC - SILICONE (20EA/CA)

## (undated) DEVICE — KIT  I.V. START (100EA/CA)

## (undated) DEVICE — MIDAS LUBRICATOR DIFFUSER PACK (4EA/CA)

## (undated) DEVICE — CLIP MED LG INTNL HRZN TI ESCP - (20/BX)

## (undated) DEVICE — PACK MINOR BASIN - (2EA/CA)

## (undated) DEVICE — BOVIE BLADE COATED &INSULATED - 25/PK

## (undated) DEVICE — BONE MILL BM210

## (undated) DEVICE — NEEDLE INSUFFLATION FOR STEP - (12/BX)

## (undated) DEVICE — CLOSURE SKIN STRIP 1/2 X 4 IN - (STERI STRIP) (50/BX 4BX/CA)

## (undated) DEVICE — KIT SURGIFLO W/OUT THROMBIN - (6EA/CA)

## (undated) DEVICE — BAG RETRIEVAL 10ML (10EA/BX)

## (undated) DEVICE — DRAPE LAPAROTOMY T SHEET - (12EA/CA)

## (undated) DEVICE — NEEDLE SPINAL NON-SAFETY 18 GA X 3 IN (25EA/BX)

## (undated) DEVICE — SCISSORS 5MM CVD (6EA/BX)

## (undated) DEVICE — SYRINGE SAFETY 10 ML 18 GA X 1 1/2 BLUNT LL (100/BX 4BX/CA)

## (undated) DEVICE — GLOVE BIOGEL SZ 7 SURGICAL PF LTX - (50PR/BX 4BX/CA)

## (undated) DEVICE — CANNULA W/SEAL 5X100 Z-THRE - ADED KII (12/BX)